# Patient Record
Sex: FEMALE | Race: WHITE | Employment: OTHER | URBAN - NONMETROPOLITAN AREA
[De-identification: names, ages, dates, MRNs, and addresses within clinical notes are randomized per-mention and may not be internally consistent; named-entity substitution may affect disease eponyms.]

---

## 2023-12-30 ENCOUNTER — APPOINTMENT (OUTPATIENT)
Dept: GENERAL RADIOLOGY | Age: 78
DRG: 335 | End: 2023-12-30
Payer: MEDICARE

## 2023-12-30 ENCOUNTER — HOSPITAL ENCOUNTER (INPATIENT)
Age: 78
LOS: 10 days | Discharge: ANOTHER HOSPITAL | DRG: 335 | End: 2024-01-09
Attending: EMERGENCY MEDICINE | Admitting: INTERNAL MEDICINE
Payer: MEDICARE

## 2023-12-30 ENCOUNTER — APPOINTMENT (OUTPATIENT)
Dept: CT IMAGING | Age: 78
DRG: 335 | End: 2023-12-30
Payer: MEDICARE

## 2023-12-30 DIAGNOSIS — K56.699 OTHER SPECIFIED INTESTINAL OBSTRUCTION, UNSPECIFIED WHETHER PARTIAL OR COMPLETE (HCC): ICD-10-CM

## 2023-12-30 DIAGNOSIS — K56.609 SMALL BOWEL OBSTRUCTION (HCC): Primary | ICD-10-CM

## 2023-12-30 DIAGNOSIS — J96.01 ACUTE RESPIRATORY FAILURE WITH HYPOXIA (HCC): ICD-10-CM

## 2023-12-30 LAB
ALBUMIN SERPL-MCNC: 4.6 G/DL (ref 3.5–5.2)
ALBUMIN/GLOB SERPL: 1.4 {RATIO} (ref 1–2.5)
ALP SERPL-CCNC: 77 U/L (ref 35–104)
ALT SERPL-CCNC: 19 U/L (ref 5–33)
ANION GAP SERPL CALCULATED.3IONS-SCNC: 15 MMOL/L (ref 9–17)
AST SERPL-CCNC: 21 U/L
BASOPHILS # BLD: 0.07 K/UL (ref 0–0.2)
BASOPHILS NFR BLD: 1 % (ref 0–2)
BILIRUB SERPL-MCNC: 1.6 MG/DL (ref 0.3–1.2)
BUN SERPL-MCNC: 32 MG/DL (ref 8–23)
BUN/CREAT SERPL: 64 (ref 9–20)
CALCIUM SERPL-MCNC: 9.8 MG/DL (ref 8.6–10.4)
CHLORIDE SERPL-SCNC: 100 MMOL/L (ref 98–107)
CO2 SERPL-SCNC: 24 MMOL/L (ref 20–31)
CREAT SERPL-MCNC: 0.5 MG/DL (ref 0.5–0.9)
EOSINOPHIL # BLD: 0 K/UL (ref 0–0.44)
EOSINOPHILS RELATIVE PERCENT: 0 % (ref 1–4)
ERYTHROCYTE [DISTWIDTH] IN BLOOD BY AUTOMATED COUNT: 13.4 % (ref 11.8–14.4)
GFR SERPL CREATININE-BSD FRML MDRD: >60 ML/MIN/1.73M2
GLUCOSE SERPL-MCNC: 168 MG/DL (ref 70–99)
HCT VFR BLD AUTO: 45.2 % (ref 36.3–47.1)
HGB BLD-MCNC: 15.1 G/DL (ref 11.9–15.1)
IMM GRANULOCYTES # BLD AUTO: 0 K/UL (ref 0–0.3)
IMM GRANULOCYTES NFR BLD: 0 %
LACTATE BLDV-SCNC: 1.7 MMOL/L (ref 0.5–2.2)
LIPASE SERPL-CCNC: 10 U/L (ref 13–60)
LYMPHOCYTES NFR BLD: 1.04 K/UL (ref 1.1–3.7)
LYMPHOCYTES RELATIVE PERCENT: 16 % (ref 24–43)
MCH RBC QN AUTO: 30.2 PG (ref 25.2–33.5)
MCHC RBC AUTO-ENTMCNC: 33.4 G/DL (ref 28.4–34.8)
MCV RBC AUTO: 90.4 FL (ref 82.6–102.9)
MONOCYTES NFR BLD: 1.3 K/UL (ref 0.1–1.2)
MONOCYTES NFR BLD: 20 % (ref 3–12)
MORPHOLOGY: ABNORMAL
NEUTROPHILS NFR BLD: 63 % (ref 36–65)
NEUTS SEG NFR BLD: 4.09 K/UL (ref 1.5–8.1)
NRBC BLD-RTO: 0 PER 100 WBC
PLATELET # BLD AUTO: 369 K/UL (ref 138–453)
PMV BLD AUTO: 8.2 FL (ref 8.1–13.5)
POTASSIUM SERPL-SCNC: 3.6 MMOL/L (ref 3.7–5.3)
PROT SERPL-MCNC: 8 G/DL (ref 6.4–8.3)
RBC # BLD AUTO: 5 M/UL (ref 3.95–5.11)
SODIUM SERPL-SCNC: 139 MMOL/L (ref 135–144)
WBC OTHER # BLD: 6.5 K/UL (ref 3.5–11.3)

## 2023-12-30 PROCEDURE — 83605 ASSAY OF LACTIC ACID: CPT

## 2023-12-30 PROCEDURE — 36415 COLL VENOUS BLD VENIPUNCTURE: CPT

## 2023-12-30 PROCEDURE — 6360000002 HC RX W HCPCS: Performed by: EMERGENCY MEDICINE

## 2023-12-30 PROCEDURE — 74018 RADEX ABDOMEN 1 VIEW: CPT

## 2023-12-30 PROCEDURE — 81001 URINALYSIS AUTO W/SCOPE: CPT

## 2023-12-30 PROCEDURE — 74176 CT ABD & PELVIS W/O CONTRAST: CPT

## 2023-12-30 PROCEDURE — 85025 COMPLETE CBC W/AUTO DIFF WBC: CPT

## 2023-12-30 PROCEDURE — 96375 TX/PRO/DX INJ NEW DRUG ADDON: CPT

## 2023-12-30 PROCEDURE — 94761 N-INVAS EAR/PLS OXIMETRY MLT: CPT

## 2023-12-30 PROCEDURE — A4216 STERILE WATER/SALINE, 10 ML: HCPCS | Performed by: EMERGENCY MEDICINE

## 2023-12-30 PROCEDURE — 1200000000 HC SEMI PRIVATE

## 2023-12-30 PROCEDURE — 96376 TX/PRO/DX INJ SAME DRUG ADON: CPT

## 2023-12-30 PROCEDURE — 94664 DEMO&/EVAL PT USE INHALER: CPT

## 2023-12-30 PROCEDURE — 96361 HYDRATE IV INFUSION ADD-ON: CPT

## 2023-12-30 PROCEDURE — 83690 ASSAY OF LIPASE: CPT

## 2023-12-30 PROCEDURE — 2580000003 HC RX 258: Performed by: INTERNAL MEDICINE

## 2023-12-30 PROCEDURE — 99223 1ST HOSP IP/OBS HIGH 75: CPT | Performed by: SPECIALIST

## 2023-12-30 PROCEDURE — 80053 COMPREHEN METABOLIC PANEL: CPT

## 2023-12-30 PROCEDURE — 96374 THER/PROPH/DIAG INJ IV PUSH: CPT

## 2023-12-30 PROCEDURE — 2580000003 HC RX 258: Performed by: EMERGENCY MEDICINE

## 2023-12-30 PROCEDURE — 99285 EMERGENCY DEPT VISIT HI MDM: CPT

## 2023-12-30 PROCEDURE — C9113 INJ PANTOPRAZOLE SODIUM, VIA: HCPCS | Performed by: EMERGENCY MEDICINE

## 2023-12-30 RX ORDER — FENTANYL CITRATE 50 UG/ML
25 INJECTION, SOLUTION INTRAMUSCULAR; INTRAVENOUS ONCE
Status: COMPLETED | OUTPATIENT
Start: 2023-12-30 | End: 2023-12-30

## 2023-12-30 RX ORDER — FENTANYL CITRATE 50 UG/ML
50 INJECTION, SOLUTION INTRAMUSCULAR; INTRAVENOUS EVERY 4 HOURS PRN
Status: DISCONTINUED | OUTPATIENT
Start: 2023-12-30 | End: 2024-01-09 | Stop reason: HOSPADM

## 2023-12-30 RX ORDER — POTASSIUM CHLORIDE 7.45 MG/ML
10 INJECTION INTRAVENOUS PRN
Status: DISCONTINUED | OUTPATIENT
Start: 2023-12-30 | End: 2024-01-09 | Stop reason: HOSPADM

## 2023-12-30 RX ORDER — POTASSIUM CHLORIDE 20 MEQ/1
40 TABLET, EXTENDED RELEASE ORAL PRN
Status: DISCONTINUED | OUTPATIENT
Start: 2023-12-30 | End: 2024-01-09 | Stop reason: HOSPADM

## 2023-12-30 RX ORDER — SODIUM CHLORIDE 0.9 % (FLUSH) 0.9 %
10 SYRINGE (ML) INJECTION PRN
Status: DISCONTINUED | OUTPATIENT
Start: 2023-12-30 | End: 2024-01-09 | Stop reason: HOSPADM

## 2023-12-30 RX ORDER — POLYETHYLENE GLYCOL 3350 17 G/17G
17 POWDER, FOR SOLUTION ORAL DAILY PRN
Status: DISCONTINUED | OUTPATIENT
Start: 2023-12-30 | End: 2023-12-31

## 2023-12-30 RX ORDER — ALBUTEROL SULFATE 90 UG/1
2 AEROSOL, METERED RESPIRATORY (INHALATION) 4 TIMES DAILY
COMMUNITY
Start: 2023-12-01

## 2023-12-30 RX ORDER — MAGNESIUM SULFATE IN WATER 40 MG/ML
2000 INJECTION, SOLUTION INTRAVENOUS PRN
Status: DISCONTINUED | OUTPATIENT
Start: 2023-12-30 | End: 2024-01-09 | Stop reason: HOSPADM

## 2023-12-30 RX ORDER — ALBUTEROL SULFATE 90 UG/1
2 AEROSOL, METERED RESPIRATORY (INHALATION) 4 TIMES DAILY
Status: DISCONTINUED | OUTPATIENT
Start: 2023-12-30 | End: 2023-12-31

## 2023-12-30 RX ORDER — ONDANSETRON 2 MG/ML
4 INJECTION INTRAMUSCULAR; INTRAVENOUS EVERY 6 HOURS PRN
Status: DISCONTINUED | OUTPATIENT
Start: 2023-12-30 | End: 2023-12-31

## 2023-12-30 RX ORDER — ACETAMINOPHEN 325 MG/1
650 TABLET ORAL EVERY 6 HOURS PRN
Status: DISCONTINUED | OUTPATIENT
Start: 2023-12-30 | End: 2023-12-31

## 2023-12-30 RX ORDER — CALCITONIN SALMON 200 [IU]/.09ML
1 SPRAY, METERED NASAL DAILY
Status: DISCONTINUED | OUTPATIENT
Start: 2023-12-31 | End: 2024-01-09 | Stop reason: HOSPADM

## 2023-12-30 RX ORDER — DEXTROSE MONOHYDRATE, SODIUM CHLORIDE, AND POTASSIUM CHLORIDE 50; 1.49; 4.5 G/1000ML; G/1000ML; G/1000ML
INJECTION, SOLUTION INTRAVENOUS CONTINUOUS
Status: DISCONTINUED | OUTPATIENT
Start: 2023-12-30 | End: 2024-01-06

## 2023-12-30 RX ORDER — ONDANSETRON 4 MG/1
4 TABLET, ORALLY DISINTEGRATING ORAL EVERY 8 HOURS PRN
Status: DISCONTINUED | OUTPATIENT
Start: 2023-12-30 | End: 2023-12-31

## 2023-12-30 RX ORDER — ACETAMINOPHEN 650 MG/1
650 SUPPOSITORY RECTAL EVERY 6 HOURS PRN
Status: DISCONTINUED | OUTPATIENT
Start: 2023-12-30 | End: 2023-12-31

## 2023-12-30 RX ORDER — CALCITONIN SALMON 200 [IU]/.09ML
1 SPRAY, METERED NASAL DAILY
COMMUNITY
Start: 2023-12-04

## 2023-12-30 RX ORDER — 0.9 % SODIUM CHLORIDE 0.9 %
1000 INTRAVENOUS SOLUTION INTRAVENOUS ONCE
Status: COMPLETED | OUTPATIENT
Start: 2023-12-30 | End: 2023-12-30

## 2023-12-30 RX ORDER — SODIUM CHLORIDE 0.9 % (FLUSH) 0.9 %
5-40 SYRINGE (ML) INJECTION EVERY 12 HOURS SCHEDULED
Status: DISCONTINUED | OUTPATIENT
Start: 2023-12-30 | End: 2024-01-09 | Stop reason: HOSPADM

## 2023-12-30 RX ORDER — SODIUM CHLORIDE 9 MG/ML
INJECTION, SOLUTION INTRAVENOUS CONTINUOUS
Status: DISCONTINUED | OUTPATIENT
Start: 2023-12-30 | End: 2023-12-31

## 2023-12-30 RX ORDER — SODIUM CHLORIDE 9 MG/ML
INJECTION, SOLUTION INTRAVENOUS PRN
Status: DISCONTINUED | OUTPATIENT
Start: 2023-12-30 | End: 2024-01-09 | Stop reason: HOSPADM

## 2023-12-30 RX ORDER — ONDANSETRON 2 MG/ML
4 INJECTION INTRAMUSCULAR; INTRAVENOUS ONCE
Status: COMPLETED | OUTPATIENT
Start: 2023-12-30 | End: 2023-12-30

## 2023-12-30 RX ADMIN — ONDANSETRON 4 MG: 2 INJECTION INTRAMUSCULAR; INTRAVENOUS at 20:41

## 2023-12-30 RX ADMIN — Medication 25 MCG: at 22:41

## 2023-12-30 RX ADMIN — SODIUM CHLORIDE 1000 ML: 9 INJECTION, SOLUTION INTRAVENOUS at 21:14

## 2023-12-30 RX ADMIN — SODIUM CHLORIDE: 9 INJECTION, SOLUTION INTRAVENOUS at 23:31

## 2023-12-30 RX ADMIN — FENTANYL CITRATE 25 MCG: 50 INJECTION, SOLUTION INTRAMUSCULAR; INTRAVENOUS at 20:44

## 2023-12-30 RX ADMIN — PANTOPRAZOLE SODIUM 40 MG: 40 INJECTION, POWDER, FOR SOLUTION INTRAVENOUS at 20:46

## 2023-12-30 ASSESSMENT — PAIN SCALES - GENERAL
PAINLEVEL_OUTOF10: 8
PAINLEVEL_OUTOF10: 6
PAINLEVEL_OUTOF10: 8
PAINLEVEL_OUTOF10: 7

## 2023-12-30 ASSESSMENT — PAIN DESCRIPTION - LOCATION
LOCATION: BACK
LOCATION: ABDOMEN
LOCATION: BACK
LOCATION: BACK

## 2023-12-30 ASSESSMENT — PAIN DESCRIPTION - ORIENTATION
ORIENTATION: LOWER
ORIENTATION: LOWER

## 2023-12-30 ASSESSMENT — PAIN - FUNCTIONAL ASSESSMENT: PAIN_FUNCTIONAL_ASSESSMENT: 0-10

## 2023-12-30 ASSESSMENT — PAIN DESCRIPTION - PAIN TYPE: TYPE: CHRONIC PAIN

## 2023-12-31 ENCOUNTER — ANESTHESIA EVENT (OUTPATIENT)
Dept: MEDSURG UNIT | Age: 78
End: 2023-12-31
Payer: MEDICARE

## 2023-12-31 ENCOUNTER — ANESTHESIA (OUTPATIENT)
Dept: MEDSURG UNIT | Age: 78
End: 2023-12-31
Payer: MEDICARE

## 2023-12-31 ENCOUNTER — APPOINTMENT (OUTPATIENT)
Dept: GENERAL RADIOLOGY | Age: 78
DRG: 335 | End: 2023-12-31
Payer: MEDICARE

## 2023-12-31 ENCOUNTER — ANESTHESIA (OUTPATIENT)
Dept: OPERATING ROOM | Age: 78
End: 2023-12-31
Payer: MEDICARE

## 2023-12-31 ENCOUNTER — ANESTHESIA EVENT (OUTPATIENT)
Dept: OPERATING ROOM | Age: 78
End: 2023-12-31
Payer: MEDICARE

## 2023-12-31 LAB
ALBUMIN SERPL-MCNC: 3.9 G/DL (ref 3.5–5.2)
ALBUMIN/GLOB SERPL: 1.4 {RATIO} (ref 1–2.5)
ALP SERPL-CCNC: 61 U/L (ref 35–104)
ALT SERPL-CCNC: 20 U/L (ref 5–33)
ANION GAP SERPL CALCULATED.3IONS-SCNC: 10 MMOL/L (ref 9–17)
AST SERPL-CCNC: 21 U/L
BACTERIA URNS QL MICRO: ABNORMAL
BASOPHILS # BLD: 0 K/UL (ref 0–0.2)
BASOPHILS NFR BLD: 0 % (ref 0–2)
BILIRUB SERPL-MCNC: 1.3 MG/DL (ref 0.3–1.2)
BILIRUB UR QL STRIP: NEGATIVE
BUN SERPL-MCNC: 23 MG/DL (ref 8–23)
BUN/CREAT SERPL: 38 (ref 9–20)
CALCIUM SERPL-MCNC: 8.5 MG/DL (ref 8.6–10.4)
CHLORIDE SERPL-SCNC: 108 MMOL/L (ref 98–107)
CLARITY UR: CLEAR
CO2 SERPL-SCNC: 25 MMOL/L (ref 20–31)
COLOR UR: YELLOW
CREAT SERPL-MCNC: 0.6 MG/DL (ref 0.5–0.9)
EOSINOPHIL # BLD: 0 K/UL (ref 0–0.44)
EOSINOPHILS RELATIVE PERCENT: 0 % (ref 1–4)
EPI CELLS #/AREA URNS HPF: ABNORMAL /HPF (ref 0–25)
ERYTHROCYTE [DISTWIDTH] IN BLOOD BY AUTOMATED COUNT: 13.4 % (ref 11.8–14.4)
GFR SERPL CREATININE-BSD FRML MDRD: >60 ML/MIN/1.73M2
GLUCOSE SERPL-MCNC: 155 MG/DL (ref 70–99)
GLUCOSE UR STRIP-MCNC: NEGATIVE MG/DL
HCT VFR BLD AUTO: 38.2 % (ref 36.3–47.1)
HGB BLD-MCNC: 12.8 G/DL (ref 11.9–15.1)
HGB UR QL STRIP.AUTO: NEGATIVE
IMM GRANULOCYTES # BLD AUTO: 0 K/UL (ref 0–0.3)
IMM GRANULOCYTES NFR BLD: 0 %
KETONES UR STRIP-MCNC: ABNORMAL MG/DL
LEUKOCYTE ESTERASE UR QL STRIP: NEGATIVE
LYMPHOCYTES NFR BLD: 1.76 K/UL (ref 1.1–3.7)
LYMPHOCYTES RELATIVE PERCENT: 27 % (ref 24–43)
MCH RBC QN AUTO: 30.6 PG (ref 25.2–33.5)
MCHC RBC AUTO-ENTMCNC: 33.5 G/DL (ref 28.4–34.8)
MCV RBC AUTO: 91.4 FL (ref 82.6–102.9)
MONOCYTES NFR BLD: 1.3 K/UL (ref 0.1–1.2)
MONOCYTES NFR BLD: 20 % (ref 3–12)
MORPHOLOGY: ABNORMAL
MUCOUS THREADS URNS QL MICRO: ABNORMAL
NEUTROPHILS NFR BLD: 53 % (ref 36–65)
NEUTS SEG NFR BLD: 3.44 K/UL (ref 1.5–8.1)
NITRITE UR QL STRIP: NEGATIVE
NRBC BLD-RTO: 0 PER 100 WBC
PARTIAL THROMBOPLASTIN TIME: 26.3 SEC (ref 26.8–34.8)
PH UR STRIP: 6 [PH] (ref 5–9)
PLATELET # BLD AUTO: 297 K/UL (ref 138–453)
PMV BLD AUTO: 8.3 FL (ref 8.1–13.5)
POTASSIUM SERPL-SCNC: 3.7 MMOL/L (ref 3.7–5.3)
PROT SERPL-MCNC: 6.7 G/DL (ref 6.4–8.3)
PROT UR STRIP-MCNC: ABNORMAL MG/DL
RBC # BLD AUTO: 4.18 M/UL (ref 3.95–5.11)
RBC #/AREA URNS HPF: ABNORMAL /HPF (ref 0–2)
SODIUM SERPL-SCNC: 143 MMOL/L (ref 135–144)
SP GR UR STRIP: 1.02 (ref 1.01–1.02)
UROBILINOGEN UR STRIP-ACNC: NORMAL EU/DL (ref 0–1)
WBC #/AREA URNS HPF: ABNORMAL /HPF (ref 0–5)
WBC OTHER # BLD: 6.5 K/UL (ref 3.5–11.3)

## 2023-12-31 PROCEDURE — 6360000002 HC RX W HCPCS: Performed by: EMERGENCY MEDICINE

## 2023-12-31 PROCEDURE — 87205 SMEAR GRAM STAIN: CPT

## 2023-12-31 PROCEDURE — 94761 N-INVAS EAR/PLS OXIMETRY MLT: CPT

## 2023-12-31 PROCEDURE — 2580000003 HC RX 258: Performed by: NURSE ANESTHETIST, CERTIFIED REGISTERED

## 2023-12-31 PROCEDURE — A4216 STERILE WATER/SALINE, 10 ML: HCPCS | Performed by: EMERGENCY MEDICINE

## 2023-12-31 PROCEDURE — 94664 DEMO&/EVAL PT USE INHALER: CPT

## 2023-12-31 PROCEDURE — 36415 COLL VENOUS BLD VENIPUNCTURE: CPT

## 2023-12-31 PROCEDURE — 2580000003 HC RX 258: Performed by: SPECIALIST

## 2023-12-31 PROCEDURE — 2709999900 HC NON-CHARGEABLE SUPPLY: Performed by: SPECIALIST

## 2023-12-31 PROCEDURE — 99232 SBSQ HOSP IP/OBS MODERATE 35: CPT | Performed by: SPECIALIST

## 2023-12-31 PROCEDURE — 3600000004 HC SURGERY LEVEL 4 BASE: Performed by: SPECIALIST

## 2023-12-31 PROCEDURE — 7100000001 HC PACU RECOVERY - ADDTL 15 MIN: Performed by: SPECIALIST

## 2023-12-31 PROCEDURE — 6360000002 HC RX W HCPCS: Performed by: SPECIALIST

## 2023-12-31 PROCEDURE — 2000000000 HC ICU R&B

## 2023-12-31 PROCEDURE — 6360000002 HC RX W HCPCS: Performed by: INTERNAL MEDICINE

## 2023-12-31 PROCEDURE — 2700000000 HC OXYGEN THERAPY PER DAY

## 2023-12-31 PROCEDURE — 6370000000 HC RX 637 (ALT 250 FOR IP): Performed by: INTERNAL MEDICINE

## 2023-12-31 PROCEDURE — 94640 AIRWAY INHALATION TREATMENT: CPT

## 2023-12-31 PROCEDURE — 93005 ELECTROCARDIOGRAM TRACING: CPT | Performed by: SPECIALIST

## 2023-12-31 PROCEDURE — 7100000000 HC PACU RECOVERY - FIRST 15 MIN: Performed by: SPECIALIST

## 2023-12-31 PROCEDURE — 97166 OT EVAL MOD COMPLEX 45 MIN: CPT

## 2023-12-31 PROCEDURE — 2500000003 HC RX 250 WO HCPCS: Performed by: SPECIALIST

## 2023-12-31 PROCEDURE — 2580000003 HC RX 258: Performed by: EMERGENCY MEDICINE

## 2023-12-31 PROCEDURE — 87075 CULTR BACTERIA EXCEPT BLOOD: CPT

## 2023-12-31 PROCEDURE — 80053 COMPREHEN METABOLIC PANEL: CPT

## 2023-12-31 PROCEDURE — 6360000002 HC RX W HCPCS: Performed by: NURSE ANESTHETIST, CERTIFIED REGISTERED

## 2023-12-31 PROCEDURE — 85025 COMPLETE CBC W/AUTO DIFF WBC: CPT

## 2023-12-31 PROCEDURE — C9113 INJ PANTOPRAZOLE SODIUM, VIA: HCPCS | Performed by: EMERGENCY MEDICINE

## 2023-12-31 PROCEDURE — 2720000010 HC SURG SUPPLY STERILE: Performed by: SPECIALIST

## 2023-12-31 PROCEDURE — 3700000001 HC ADD 15 MINUTES (ANESTHESIA): Performed by: SPECIALIST

## 2023-12-31 PROCEDURE — 85730 THROMBOPLASTIN TIME PARTIAL: CPT

## 2023-12-31 PROCEDURE — 3600000014 HC SURGERY LEVEL 4 ADDTL 15MIN: Performed by: SPECIALIST

## 2023-12-31 PROCEDURE — 51702 INSERT TEMP BLADDER CATH: CPT

## 2023-12-31 PROCEDURE — 0DN80ZZ RELEASE SMALL INTESTINE, OPEN APPROACH: ICD-10-PCS | Performed by: SPECIALIST

## 2023-12-31 PROCEDURE — 74019 RADEX ABDOMEN 2 VIEWS: CPT

## 2023-12-31 PROCEDURE — 3700000000 HC ANESTHESIA ATTENDED CARE: Performed by: SPECIALIST

## 2023-12-31 PROCEDURE — C1765 ADHESION BARRIER: HCPCS | Performed by: SPECIALIST

## 2023-12-31 PROCEDURE — 74018 RADEX ABDOMEN 1 VIEW: CPT

## 2023-12-31 PROCEDURE — 87070 CULTURE OTHR SPECIMN AEROBIC: CPT

## 2023-12-31 PROCEDURE — 2500000003 HC RX 250 WO HCPCS: Performed by: NURSE ANESTHETIST, CERTIFIED REGISTERED

## 2023-12-31 DEVICE — BARRIER, ABSORBABLE, ADHESION
Type: IMPLANTABLE DEVICE | Site: ABDOMEN | Status: FUNCTIONAL
Brand: SEPRAFILM®

## 2023-12-31 RX ORDER — MORPHINE SULFATE 2 MG/ML
0.5 INJECTION, SOLUTION INTRAMUSCULAR; INTRAVENOUS
Status: DISCONTINUED | OUTPATIENT
Start: 2023-12-31 | End: 2024-01-09 | Stop reason: HOSPADM

## 2023-12-31 RX ORDER — PHENYLEPHRINE HYDROCHLORIDE 10 MG/ML
INJECTION INTRAVENOUS PRN
Status: DISCONTINUED | OUTPATIENT
Start: 2023-12-31 | End: 2023-12-31 | Stop reason: SDUPTHER

## 2023-12-31 RX ORDER — PROPOFOL 10 MG/ML
INJECTION, EMULSION INTRAVENOUS PRN
Status: DISCONTINUED | OUTPATIENT
Start: 2023-12-31 | End: 2023-12-31 | Stop reason: SDUPTHER

## 2023-12-31 RX ORDER — PROCHLORPERAZINE EDISYLATE 5 MG/ML
5 INJECTION INTRAMUSCULAR; INTRAVENOUS
Status: ACTIVE | OUTPATIENT
Start: 2023-12-31 | End: 2024-01-01

## 2023-12-31 RX ORDER — DEXAMETHASONE SODIUM PHOSPHATE 4 MG/ML
INJECTION, SOLUTION INTRA-ARTICULAR; INTRALESIONAL; INTRAMUSCULAR; INTRAVENOUS; SOFT TISSUE PRN
Status: DISCONTINUED | OUTPATIENT
Start: 2023-12-31 | End: 2023-12-31 | Stop reason: SDUPTHER

## 2023-12-31 RX ORDER — SODIUM CHLORIDE 0.9 % (FLUSH) 0.9 %
5-40 SYRINGE (ML) INJECTION EVERY 12 HOURS SCHEDULED
Status: DISCONTINUED | OUTPATIENT
Start: 2023-12-31 | End: 2023-12-31

## 2023-12-31 RX ORDER — ONDANSETRON 2 MG/ML
INJECTION INTRAMUSCULAR; INTRAVENOUS PRN
Status: DISCONTINUED | OUTPATIENT
Start: 2023-12-31 | End: 2023-12-31 | Stop reason: SDUPTHER

## 2023-12-31 RX ORDER — ROCURONIUM BROMIDE 10 MG/ML
INJECTION, SOLUTION INTRAVENOUS PRN
Status: DISCONTINUED | OUTPATIENT
Start: 2023-12-31 | End: 2023-12-31 | Stop reason: SDUPTHER

## 2023-12-31 RX ORDER — FENTANYL CITRATE 50 UG/ML
50 INJECTION, SOLUTION INTRAMUSCULAR; INTRAVENOUS EVERY 5 MIN PRN
Status: DISCONTINUED | OUTPATIENT
Start: 2023-12-31 | End: 2023-12-31

## 2023-12-31 RX ORDER — SUCCINYLCHOLINE CHLORIDE 20 MG/ML
INJECTION INTRAMUSCULAR; INTRAVENOUS PRN
Status: DISCONTINUED | OUTPATIENT
Start: 2023-12-31 | End: 2023-12-31 | Stop reason: SDUPTHER

## 2023-12-31 RX ORDER — ONDANSETRON 2 MG/ML
4 INJECTION INTRAMUSCULAR; INTRAVENOUS EVERY 6 HOURS PRN
Status: DISCONTINUED | OUTPATIENT
Start: 2023-12-31 | End: 2024-01-09 | Stop reason: HOSPADM

## 2023-12-31 RX ORDER — SODIUM CHLORIDE, SODIUM LACTATE, POTASSIUM CHLORIDE, CALCIUM CHLORIDE 600; 310; 30; 20 MG/100ML; MG/100ML; MG/100ML; MG/100ML
INJECTION, SOLUTION INTRAVENOUS CONTINUOUS PRN
Status: DISCONTINUED | OUTPATIENT
Start: 2023-12-31 | End: 2023-12-31 | Stop reason: SDUPTHER

## 2023-12-31 RX ORDER — LABETALOL HYDROCHLORIDE 5 MG/ML
10 INJECTION, SOLUTION INTRAVENOUS
Status: DISCONTINUED | OUTPATIENT
Start: 2023-12-31 | End: 2023-12-31

## 2023-12-31 RX ORDER — ENOXAPARIN SODIUM 100 MG/ML
30 INJECTION SUBCUTANEOUS DAILY
Status: DISCONTINUED | OUTPATIENT
Start: 2023-12-31 | End: 2024-01-09 | Stop reason: HOSPADM

## 2023-12-31 RX ORDER — SODIUM CHLORIDE 0.9 % (FLUSH) 0.9 %
5-40 SYRINGE (ML) INJECTION PRN
Status: DISCONTINUED | OUTPATIENT
Start: 2023-12-31 | End: 2024-01-03

## 2023-12-31 RX ORDER — OXYCODONE HYDROCHLORIDE 5 MG/1
10 TABLET ORAL PRN
Status: DISCONTINUED | OUTPATIENT
Start: 2023-12-31 | End: 2023-12-31

## 2023-12-31 RX ORDER — HYDRALAZINE HYDROCHLORIDE 20 MG/ML
10 INJECTION INTRAMUSCULAR; INTRAVENOUS
Status: DISCONTINUED | OUTPATIENT
Start: 2023-12-31 | End: 2023-12-31

## 2023-12-31 RX ORDER — FENTANYL CITRATE 50 UG/ML
INJECTION, SOLUTION INTRAMUSCULAR; INTRAVENOUS PRN
Status: DISCONTINUED | OUTPATIENT
Start: 2023-12-31 | End: 2023-12-31 | Stop reason: SDUPTHER

## 2023-12-31 RX ORDER — OXYCODONE HYDROCHLORIDE 5 MG/1
5 TABLET ORAL PRN
Status: DISCONTINUED | OUTPATIENT
Start: 2023-12-31 | End: 2023-12-31

## 2023-12-31 RX ORDER — CLONIDINE 100 UG/ML
INJECTION, SOLUTION EPIDURAL PRN
Status: DISCONTINUED | OUTPATIENT
Start: 2023-12-31 | End: 2023-12-31 | Stop reason: SDUPTHER

## 2023-12-31 RX ORDER — CEFOXITIN 1 G/1
INJECTION, POWDER, FOR SOLUTION INTRAVENOUS PRN
Status: DISCONTINUED | OUTPATIENT
Start: 2023-12-31 | End: 2023-12-31 | Stop reason: SDUPTHER

## 2023-12-31 RX ORDER — SODIUM CHLORIDE 9 MG/ML
INJECTION, SOLUTION INTRAVENOUS PRN
Status: DISCONTINUED | OUTPATIENT
Start: 2023-12-31 | End: 2023-12-31

## 2023-12-31 RX ORDER — KETAMINE HCL 50MG/ML(1)
SYRINGE (ML) INTRAVENOUS PRN
Status: DISCONTINUED | OUTPATIENT
Start: 2023-12-31 | End: 2023-12-31 | Stop reason: SDUPTHER

## 2023-12-31 RX ORDER — ONDANSETRON 2 MG/ML
4 INJECTION INTRAMUSCULAR; INTRAVENOUS
Status: DISCONTINUED | OUTPATIENT
Start: 2023-12-31 | End: 2023-12-31

## 2023-12-31 RX ORDER — ALBUTEROL SULFATE 90 UG/1
2 AEROSOL, METERED RESPIRATORY (INHALATION) 3 TIMES DAILY PRN
Status: DISCONTINUED | OUTPATIENT
Start: 2023-12-31 | End: 2024-01-02

## 2023-12-31 RX ADMIN — ROCURONIUM BROMIDE 25 MG: 10 INJECTION, SOLUTION INTRAVENOUS at 14:57

## 2023-12-31 RX ADMIN — ONDANSETRON 4 MG: 2 INJECTION INTRAMUSCULAR; INTRAVENOUS at 08:29

## 2023-12-31 RX ADMIN — CEFOXITIN SODIUM 1000 MG: 1 POWDER, FOR SOLUTION INTRAVENOUS at 14:46

## 2023-12-31 RX ADMIN — MORPHINE SULFATE 0.5 MG: 2 INJECTION, SOLUTION INTRAMUSCULAR; INTRAVENOUS at 23:57

## 2023-12-31 RX ADMIN — MORPHINE SULFATE 2 MG: 2 INJECTION, SOLUTION INTRAMUSCULAR; INTRAVENOUS at 15:58

## 2023-12-31 RX ADMIN — POTASSIUM CHLORIDE, DEXTROSE MONOHYDRATE AND SODIUM CHLORIDE: 150; 5; 450 INJECTION, SOLUTION INTRAVENOUS at 11:04

## 2023-12-31 RX ADMIN — CEFAZOLIN 1000 MG: 1 INJECTION, POWDER, FOR SOLUTION INTRAMUSCULAR; INTRAVENOUS at 23:59

## 2023-12-31 RX ADMIN — ALBUTEROL SULFATE 2 PUFF: 90 AEROSOL, METERED RESPIRATORY (INHALATION) at 11:35

## 2023-12-31 RX ADMIN — MORPHINE SULFATE 3 MG: 2 INJECTION, SOLUTION INTRAMUSCULAR; INTRAVENOUS at 16:05

## 2023-12-31 RX ADMIN — CLONIDINE 100 MCG: 100 INJECTION, SOLUTION EPIDURAL at 15:41

## 2023-12-31 RX ADMIN — FENTANYL CITRATE 25 MCG: 50 INJECTION INTRAMUSCULAR; INTRAVENOUS at 14:57

## 2023-12-31 RX ADMIN — DEXAMETHASONE SODIUM PHOSPHATE 4 MG: 4 INJECTION INTRA-ARTICULAR; INTRALESIONAL; INTRAMUSCULAR; INTRAVENOUS; SOFT TISSUE at 15:11

## 2023-12-31 RX ADMIN — SUCCINYLCHOLINE CHLORIDE 40 MG: 20 INJECTION, SOLUTION INTRAMUSCULAR; INTRAVENOUS at 14:51

## 2023-12-31 RX ADMIN — MORPHINE SULFATE 2 MG: 2 INJECTION, SOLUTION INTRAMUSCULAR; INTRAVENOUS at 16:25

## 2023-12-31 RX ADMIN — SUGAMMADEX 50 MG: 100 INJECTION, SOLUTION INTRAVENOUS at 15:42

## 2023-12-31 RX ADMIN — PHENYLEPHRINE HYDROCHLORIDE 200 MCG: 10 INJECTION INTRAVENOUS at 15:05

## 2023-12-31 RX ADMIN — FENTANYL CITRATE 25 MCG: 50 INJECTION INTRAMUSCULAR; INTRAVENOUS at 15:35

## 2023-12-31 RX ADMIN — ONDANSETRON 4 MG: 2 INJECTION INTRAMUSCULAR; INTRAVENOUS at 15:17

## 2023-12-31 RX ADMIN — FENTANYL CITRATE 25 MCG: 50 INJECTION INTRAMUSCULAR; INTRAVENOUS at 14:51

## 2023-12-31 RX ADMIN — Medication 20 MG: at 15:17

## 2023-12-31 RX ADMIN — ONDANSETRON 4 MG: 2 INJECTION INTRAMUSCULAR; INTRAVENOUS at 02:20

## 2023-12-31 RX ADMIN — PROPOFOL 60 MG: 10 INJECTION, EMULSION INTRAVENOUS at 14:51

## 2023-12-31 RX ADMIN — SODIUM CHLORIDE, POTASSIUM CHLORIDE, SODIUM LACTATE AND CALCIUM CHLORIDE: 600; 310; 30; 20 INJECTION, SOLUTION INTRAVENOUS at 14:46

## 2023-12-31 RX ADMIN — SUGAMMADEX 100 MG: 100 INJECTION, SOLUTION INTRAVENOUS at 15:38

## 2023-12-31 RX ADMIN — ROCURONIUM BROMIDE 5 MG: 10 INJECTION, SOLUTION INTRAVENOUS at 14:51

## 2023-12-31 RX ADMIN — PANTOPRAZOLE SODIUM 40 MG: 40 INJECTION, POWDER, FOR SOLUTION INTRAVENOUS at 08:29

## 2023-12-31 RX ADMIN — HYDROCORTISONE SODIUM SUCCINATE 100 MG: 100 INJECTION, POWDER, FOR SOLUTION INTRAMUSCULAR; INTRAVENOUS at 14:57

## 2023-12-31 RX ADMIN — FENTANYL CITRATE 25 MCG: 50 INJECTION INTRAMUSCULAR; INTRAVENOUS at 15:39

## 2023-12-31 RX ADMIN — POTASSIUM CHLORIDE, DEXTROSE MONOHYDRATE AND SODIUM CHLORIDE: 150; 5; 450 INJECTION, SOLUTION INTRAVENOUS at 00:36

## 2023-12-31 ASSESSMENT — PAIN DESCRIPTION - DESCRIPTORS
DESCRIPTORS: ACHING
DESCRIPTORS: ACHING
DESCRIPTORS: ACHING;DISCOMFORT
DESCRIPTORS: ACHING

## 2023-12-31 ASSESSMENT — PAIN DESCRIPTION - ONSET
ONSET: ON-GOING
ONSET: GRADUAL

## 2023-12-31 ASSESSMENT — PAIN SCALES - GENERAL
PAINLEVEL_OUTOF10: 5
PAINLEVEL_OUTOF10: 1
PAINLEVEL_OUTOF10: 6
PAINLEVEL_OUTOF10: 0
PAINLEVEL_OUTOF10: 4
PAINLEVEL_OUTOF10: 0

## 2023-12-31 ASSESSMENT — PAIN DESCRIPTION - ORIENTATION
ORIENTATION: LOWER
ORIENTATION: MID

## 2023-12-31 ASSESSMENT — PAIN DESCRIPTION - LOCATION
LOCATION: BACK
LOCATION: ABDOMEN

## 2023-12-31 ASSESSMENT — PAIN - FUNCTIONAL ASSESSMENT
PAIN_FUNCTIONAL_ASSESSMENT: PREVENTS OR INTERFERES SOME ACTIVE ACTIVITIES AND ADLS
PAIN_FUNCTIONAL_ASSESSMENT: NONE - DENIES PAIN
PAIN_FUNCTIONAL_ASSESSMENT: PREVENTS OR INTERFERES SOME ACTIVE ACTIVITIES AND ADLS
PAIN_FUNCTIONAL_ASSESSMENT: FACE, LEGS, ACTIVITY, CRY, AND CONSOLABILITY (FLACC)

## 2023-12-31 ASSESSMENT — PAIN DESCRIPTION - FREQUENCY
FREQUENCY: INTERMITTENT
FREQUENCY: CONTINUOUS

## 2023-12-31 ASSESSMENT — LIFESTYLE VARIABLES: HOW OFTEN DO YOU HAVE A DRINK CONTAINING ALCOHOL: NEVER

## 2023-12-31 ASSESSMENT — PAIN DESCRIPTION - PAIN TYPE
TYPE: CHRONIC PAIN
TYPE: SURGICAL PAIN
TYPE: CHRONIC PAIN
TYPE: CHRONIC PAIN

## 2023-12-31 NOTE — ANESTHESIA PRE PROCEDURE
Department of Anesthesiology  Preprocedure Note       Name:  Grace Gonzalez   Age:  78 y.o.  :  1945                                          MRN:  [id number]         Date:  2023      Surgeon: Surgeon(s):  Brendan Dumont MD    Procedure: Procedure(s):  LAPAROTOMY EXPLORATORY    Medications prior to admission:   Prior to Admission medications    Medication Sig Start Date End Date Taking? Authorizing Provider   calcitonin (MIACALCIN) 200 UNIT/ACT nasal spray 1 spray by Nasal route daily 23  Yes ProviderXuan MD   VENTOLIN  (90 Base) MCG/ACT inhaler Inhale 2 puffs into the lungs 4 times daily 23  Yes ProviderXuan MD       Current medications:    Current Facility-Administered Medications   Medication Dose Route Frequency Provider Last Rate Last Admin   • albuterol sulfate HFA (PROVENTIL;VENTOLIN;PROAIR) 108 (90 Base) MCG/ACT inhaler 2 puff  2 puff Inhalation TID PRN Armand Longoria MD   2 puff at 23 1135   • pantoprazole (PROTONIX) 40 mg in sodium chloride (PF) 0.9 % 10 mL injection  40 mg IntraVENous Daily Bing Reyes DO   40 mg at 23 0829   • calcitonin (MIACALCIN) nasal spray 1 spray (Patient Supplied)  1 spray Alternating Nares Daily Armand Longoria MD       • 0.9 % sodium chloride infusion   IntraVENous Continuous Armand Longoria MD   Stopped at 23 2340   • sodium chloride flush 0.9 % injection 5-40 mL  5-40 mL IntraVENous 2 times per day Armand Longoria MD       • sodium chloride flush 0.9 % injection 10 mL  10 mL IntraVENous PRN Armand Longoria MD       • 0.9 % sodium chloride infusion   IntraVENous PRN Armand Longoria MD       • potassium chloride (KLOR-CON M) extended release tablet 40 mEq  40 mEq Oral PRN Armand Longoria MD        Or   • potassium bicarb-citric acid (EFFER-K) effervescent tablet 40 mEq  40 mEq Oral PRN Armand Longoria MD        Or   • potassium chloride 10 mEq/100 mL IVPB (Peripheral

## 2023-12-31 NOTE — CONSULTS
Chief complaint is crampy abdominal pain with nausea and vomiting lack of a bowel movement for several days    Patient is a 80-year-old female with a past history significant for severe COPD malnutrition with significant weight loss prior history of left breast cancer and an abdominal hysterectomy who presents to the emergency room claiming that she has not had a bowel movement for approximately 4 days with increasing abdominal distention crampy abdominal pain associated nausea and vomiting she denies fever or chills no frequency urgency or dysuria    In the emergency room patient has CT scan of the abdomen pelvis shows massive gastric and small bowel dilation with a purported transition point in the right lower quadrant patient is also noted to have stool in the right ascending colon she also has a distended urinary bladder    Past medical history  1.  COPD  2.  Malnutrition with significant muscular wasting  3.  History of left breast cancer  4.  Prior abdominal hysterectomy  5.  Severe osteoarthrosis  6.  Aneurysmal abdominal aorta  7.  Peripheral vascular disease  8.  Osteoporosis  9.  Cholelithiasis    Past surgical history  1.  Left mastectomy with reconstruction using a breast implant  2.  Abdominal hysterectomy    Medications  1.  Calcitonin  2.  Ventolin inhaler    Allergies  1.  Adhesive tape  2.  Aspirin  3.  Codeine  4.  IVP dye  5.  Lidocaine  6.  Motrin    Social history patient continues to smoke occasionally drinks    Family history noncontributory    Review of systems patient denies any vertigo diplopia no circumoral numbness dysarthria dysphagia no hot or cold intolerance no history of jaundice keysha colored stools dark-colored urine appetite is generally good weight is stable no hematemesis hematochezia melena no blood dyscrasias    Physical examination  Patient is an elderly female who is exceedingly frail has considerable muscular wasting within the face neck and chest  Vital signs are stable she

## 2023-12-31 NOTE — PROGRESS NOTES
Patient arrived to  at this time from surgery, patient resting comfortably with eyes closed, FLACC 1 at this time, dressing clean dry and intact, bay patent and draining clear yellow urine, patient is alert and orient x4, family at bedside, all needs met, positioned for comfort, call light within reach, care on-going.

## 2023-12-31 NOTE — PROGRESS NOTES
Patient walked in hallway with writer at this time +1 assist with a cane. Patient tolerated activity fairly well. Patient was then assisted to sit up in the chair. Call light in reach, patient denies any other needs. Care ongoing.

## 2023-12-31 NOTE — PROGRESS NOTES
Patient was given incentive spirometer and acapella at this time and instructed on how and how often to use them, as well as the benefits of their use. Patient verbalizes understanding and was able to provide return demonstration about how to use them. Ice chips were also provided which was approved by Dr. Longoria. Care ongoing.

## 2023-12-31 NOTE — PROGRESS NOTES
Pt brought up to room 315 from ER via stretcher at this time, daughter with pt. Pt assisted over to bed, tolerated well. Report received from MINERVA Coats. Vitals and admission assessment completed at this time, see flowsheet for more details.Pt denies any pain at this time, states abdomen is tender. Navigator and medication reconciliation completed at this time. NG tube attached to suction and set to low intermittent. All needs met at this time, call light within reach. Care ongoing.

## 2023-12-31 NOTE — H&P
Armand Longoria M.D.  Internal Medicine History and Phyisical    Patient: Grace Gonzalez  Date of Admission: 2023  7:55 PM  Date of Evaluation: 2023      Patient:  Grace Gonzalez  MRN: [id number]    Chief Complaint:    Chief Complaint   Patient presents with    Abdominal Pain     Lower abd pain with emesis and constipation, states last BM . Not tolerating PO intake, denies history of bowel obstructions       History Obtained From:  patient, electronic medical record    PCP: No primary care provider on file.    History of Present Illness:   The patient is a 78 y.o. female who presents with lower abdominal pain with vomiting and constipation for about 1 day.  She ate a large dinner during [place] and has had issues since.  She not feeling very well and she has had vomiting.  About 4 days she has had the abdominal pain.  History of a prior  x 1 as well as hysterectomy.  She denies any fevers or chills.  No recent respiratory illness.  She denies any shortness of breath or chest pain.    Past Medical History:        Diagnosis Date    Arthritis     Asthma     Blood circulation, collateral     Cancer (HCC)     COPD (chronic obstructive pulmonary disease) (HCC)     Pneumonia        Past Surgical History:        Procedure Laterality Date    BREAST SURGERY       SECTION      EYE SURGERY      SKIN BIOPSY      RHODA AND BSO (CERVIX REMOVED)         Family History:   No family history on file.    Social History:   TOBACCO:   reports that she has been smoking cigarettes. She started smoking about 60 years ago. She has a 60.0 pack-year smoking history. She has never used smokeless tobacco.  ETOH:   reports no history of alcohol use.      Allergies:  Adhesive tape, Aspirin, Codeine, Iodinated contrast media, Lidocaine, and Motrin [ibuprofen]    Medications Prior to Admission:    Prior to Admission medications    Medication Sig Start Date End Date Taking? Authorizing Provider   calcitonin (MIACALCIN)  endplate compression deformity.      Cholelithiasis.      RECOMMENDATIONS:   If there is focal tenderness, recommend MRI of the lumbar spine for further   evaluation.             Assessment:    Small bowel obstruction (HCC)    Principal Problem:    Small bowel obstruction (HCC) likely adhesion related from prior  and hysterectomy  Resolved Problems:    * No resolved hospital problems. *      Patient Active Problem List    Diagnosis Date Noted    Small bowel obstruction (HCC) likely adhesion related from prior  and hysterectomy 2023       Plan:     This patient requires inpatient admission because of Small bowel obstruction (HCC)   Factors affecting the medical complexity of this patient include Principal Problem:    Small bowel obstruction (HCC) likely adhesion related from prior  and hysterectomy  Resolved Problems:    * No resolved hospital problems. *    Estimated length of stay is 3 days  Condition is --MODERATE COMPLEXITY--  Small bowel obstruction is the working diagnosis.  Surgery involved.  Very conservative management most likely.  Medication Monitoring / High Risk Medications: none         [facility] Medication Reconciliation documentation:  [x] I have utilized all available immediate resources to obtain, update, or review the patient's current medications (including all prescriptions, over-the-counter products, herbals, cannabinoid products and bitamin/mineral/dietary/nutritional supplements.  [If 'yes\", STOP HERE]     []  The patient is not eligible for medication reconciliation; the patient is in an emergent medical situation where delaying treatment would jeopardize the patient's health    []  I did NOT confirm, update or review the patient's current list of medications today.  [DOES NOT SATISFY [facility] PERFORMANCE]        [facility] Advanced Care Planning documentation: (check appropriate boxes [x]  )  [x] I have confirmed that the patient's Advance Care Plan is present, Code Status

## 2023-12-31 NOTE — PROGRESS NOTES
Discharge Criteria    Inpatients must meet Criteria 1 through 7. All other patients are either YES or N/A. If a NO is chosen then Anesthesia or Surgeon must be notified.      1.  Minimum 30 minutes after last dose of sedative medication.    Yes      2.  Systolic BP between 90 - 160. Diastolic BP between 60 - 90.    Yes      3.  Pulse between 60 - 120    Yes      4.  Respirations between 8 - 25.    Yes      5.  SpO2 92% - 100%.    Yes      6.  Able to cough and swallow or return to baseline function.    Yes      7.  Alert and oriented or return to baseline mental status.    Yes      8.  Demonstrates controlled, coordinated movements, ambulates with steady gait, or return to baseline activity function.    Yes      9.  Minimal or no pain or nausea, or at a level tolerable and acceptable to patient.    Yes      10. Takes and retains oral fluids as allowed.    Yes      11. Procedural / perioperative site stable.  Minimal or no bleeding.    Yes          12. If GI endoscopy procedure, minimal or no abdominal distention or passing flatus.    N/A      13. Written discharge instructions and emergency telephone number provided.    Yes      14. Accompanied by a responsible adult.    Yes

## 2023-12-31 NOTE — ED PROVIDER NOTES
[facility] MED SURG  Emergency Department Encounter  Emergency Medicine Resident     Pt Name:Grace Gonzalez  MRN: [id number]  Birthdate 1945  Date of evaluation: 12/30/23  PCP:  No primary care provider on file.  8:12 PM EST      CHIEF COMPLAINT       Chief Complaint   Patient presents with    Abdominal Pain     Lower abd pain with emesis and constipation, states last BM 12/25. Not tolerating PO intake, denies history of bowel obstructions       HISTORY OF PRESENT ILLNESS  (Location/Symptom, Timing/Onset, Context/Setting, Quality, Duration, Modifying Factors, Severity.)      Grace Gonzalez is a 78 y.o. female who presents with abdominal pain for the past 4 days, reports vomiting over the same duration, and unable to have a bowel movement.  History of C-sections, as well as hysterectomy.  No fevers does have a history of coughing, but no worsening shortness of breath or difficulty breathing.    PAST MEDICAL / SURGICAL / SOCIAL / FAMILY HISTORY      has a past medical history of Arthritis, Asthma, Blood circulation, collateral, Cancer (HCC), COPD (chronic obstructive pulmonary disease) (HCC), and Pneumonia.       has a past surgical history that includes Abdomen surgery; Hysterectomy; Breast surgery; eye surgery; and skin biopsy.      Social History     Socioeconomic History    Marital status: Single     Spouse name: Not on file    Number of children: Not on file    Years of education: Not on file    Highest education level: Not on file   Occupational History    Not on file   Tobacco Use    Smoking status: Every Day     Current packs/day: 1.00     Average packs/day: 1 pack/day for 60.0 years (60.0 ttl pk-yrs)     Types: Cigarettes     Start date: 1964    Smokeless tobacco: Never   Substance and Sexual Activity    Alcohol use: Never    Drug use: Never    Sexual activity: Not on file   Other Topics Concern    Not on file   Social History Narrative    Not on file     Social Determinants of Health     Financial  no

## 2023-12-31 NOTE — PLAN OF CARE
Problem: Discharge Planning  Goal: Discharge to home or other facility with appropriate resources  Outcome: Progressing  Flowsheets (Taken 12/31/2023 0002)  Discharge to home or other facility with appropriate resources: Identify barriers to discharge with patient and caregiver     Problem: Pain  Goal: Verbalizes/displays adequate comfort level or baseline comfort level  Outcome: Progressing  Flowsheets (Taken 12/30/2023 2315)  Verbalizes/displays adequate comfort level or baseline comfort level: Encourage patient to monitor pain and request assistance     Problem: Safety - Adult  Goal: Free from fall injury  Outcome: Progressing

## 2023-12-31 NOTE — PROGRESS NOTES
Occupational Therapy  Facility/Department: [facility] MED SURG  Occupational Therapy Initial Assessment    Name: Grace Gonzalez  : 1945  MRN: [id number]  Date of Service: 2023    Discharge Recommendations:  Continue to assess pending progress, Home with assist PRN  OT Equipment Recommendations  Equipment Needed: Yes  Mobility Devices: Walker  Walker: Rolling  Other: for safety d/t generalized weakness     Patient Diagnosis(es): The encounter diagnosis was Small bowel obstruction (HCC).  Past Medical History:  has a past medical history of Arthritis, Asthma, Blood circulation, collateral, Cancer (HCC), COPD (chronic obstructive pulmonary disease) (HCC), and Pneumonia.  Past Surgical History:  has a past surgical history that includes  section; Total abdominal hysterectomy w/ bilateral salpingoophorectomy; Breast surgery; eye surgery; and skin biopsy.    Treatment Diagnosis: Generalized weakness.    Assessment   Performance deficits / Impairments: Decreased functional mobility ;Decreased safe awareness;Decreased balance;Decreased ADL status;Decreased endurance;Decreased high-level IADLs;Decreased strength  Assessment: Pt is 79 yo female presenting to [facility] for small bowel obstruction. Pt demo decrease strength/ endurance, dynamic balance, and safety awareness with tubing during ADLs and functional mobility/transfers. Pt would benefit frm skilled OT to increase srength, endurance, dynamic balance, and safety awareness during ADLs and functional mobility/transfer tasks.  Treatment Diagnosis: Generalized weakness.  Prognosis: Good  Decision Making: Medium Complexity  Assistance / Modification: CGA ADLs/functional mobility  REQUIRES OT FOLLOW-UP: Yes        Plan   Occupational Therapy Plan  Times Per Day: Once a day  Days Per Week: 7 Days  Current Treatment Recommendations: Strengthening, Balance training, Functional mobility training, Endurance training, Safety education & training, Patient/Caregiver

## 2023-12-31 NOTE — PROGRESS NOTES
Hospital day #1  Patient continues to have intermittent crampy abdominal pain she denies any passage of flatus or stool she has a moderate amount of bilious drainage from nasogastric tube  Vital signs are stable she is afebrile  Chest clear to auscultation percussion  Cardiovascular regular rate and rhythm  Abdomen is still distended without guarding or rebound bowel sounds are hypoactive she has no CVA tenderness no groin or incisional hernias  Neurologically without focal findings    Laboratories all within normal limits  2 views of the abdomen shows distended small bowel with absence of gas or stool in the colon suggestive of a high-grade and/or complete obstruction    Assessment and plan this is a patient with a small bowel obstruction we will bring her to the operating room for an abdominal exploration lysis of adhesions risk benefits and potential complications were discussed with the patient and her family members and they wish to proceed with the surgery

## 2023-12-31 NOTE — RT PROTOCOL NOTE
RESPIRATORY ASSESSMENT PROTOCOL                                                                                              Patient Name: Grace Gonzalez Room#: 0315/0315-01 : 1945     Admitting diagnosis: Small bowel obstruction (HCC) [K56.609]       Medical History:   Past Medical History:   Diagnosis Date    Arthritis     Asthma     Blood circulation, collateral     Cancer (HCC)     COPD (chronic obstructive pulmonary disease) (HCC)     Pneumonia        PATIENT ASSESSMENT    LABORATORY DATA  Hematology:   Lab Results   Component Value Date/Time    WBC 6.5 2023 08:45 PM    RBC 5.00 2023 08:45 PM    HGB 15.1 2023 08:45 PM    HCT 45.2 2023 08:45 PM     2023 08:45 PM     Chemistry:  No results found for: \"PHART\", \"GJP4RDW\", \"PO2ART\", \"B6LQITMC\", \"DJB4MXT\", \"PBEA\", \"NBEA\"    VITALS  Pulse: 78   Respirations: 16  BP: (!) 125/59  SpO2: 90 % O2 Device: None (Room air)  Temp: 97.7 °F (36.5 °C)    SKIN COLOR  [x] Normal  [] Pale  [] Dusky  [] Cyanotic    RESPIRATORY PATTERN  [x] Normal  [] Dyspnea  [] Cheyne-Esparza  [] Kussmaul  [] Biots    AMBULATORY  [x] Yes  [] No  [] With Assistance    PEAK FLOW  Predicted:     Personal Best:            Patient Acuity 0 1 2 3 4 Score   Level of Consciousness (LOC) [x]  Alert & Oriented or Pt normal LOC []  Confused;follows directions []  Confused & uncooper-ative []  Obtunded []  Comatose 0   Respiratory Rate  (RR) [x]  Reg. rate & pattern. 12 - 20 bpm  []  Increased RR. Greater than 20 bpm   []  SOB w/ exertion or RR greater than 24 bpm []  Access- ory muscle use at rest. Abn.  resp. []  SOB at rest.   0   Bilateral Breath Sounds (BBS) [x]  Clear []  Diminish-ed bases  []  Diminish-ed t/o, or rales   []  Sporadic, scattered wheezes or rhonchi []  Persistentwheezes and, or absent BBS 0   Cough [x]  Strong, effective, & non-prod. []  Effective & prod. Less than 25 ml (2 TBSP) over past 24 hrs []  Ineffective & non-prod to less than 25 ML

## 2023-12-31 NOTE — PROGRESS NOTES
Shift assessment and vitals obtained at this time as charted. Vitals WNL. Patient is complaining of 4 out of 10 pain in her back, patient was repositioned and heating pad remains in place. Patient is alert and oriented x4. End expiratory wheezes noted to bilateral upper lung lobes, patient encouraged to cough and deep breath. Bowel sounds active to right upper and lower quadrant, hypoactive to left upper and lower quadrants. Abdomen is tender. NG remains in place to right nare and hooked up to low intermittent suction. Assessment otherwise as charted, see flowsheets. Patient is resting in the bed with call light in reach and family at bedside, denies other needs at this time. Care ongoing.

## 2023-12-31 NOTE — PROGRESS NOTES
Reassessment and vitals obtained at this time as charted. Vitals WNL. Patient does complain of 5 out of 10 pain in her lower back, heating pad in place. Patient remains alert and oriented x4. Lung sounds clear to diminished throughout. Occasional congested, productive cough noted. Bowel sounds remain active to right upper and lower quadrants, hypoactive to left upper and lower quadrants. NG tube remains in place to right nare and is draining. Assessment otherwise as charted, see flowsheets. Patient is resting in the chair with call light in reach, denies other needs at this time. Care ongoing.

## 2024-01-01 LAB
ALBUMIN SERPL-MCNC: 3.3 G/DL (ref 3.5–5.2)
ALBUMIN/GLOB SERPL: 1.2 {RATIO} (ref 1–2.5)
ALP SERPL-CCNC: 53 U/L (ref 35–104)
ALT SERPL-CCNC: 15 U/L (ref 5–33)
ANION GAP SERPL CALCULATED.3IONS-SCNC: 10 MMOL/L (ref 9–17)
AST SERPL-CCNC: 14 U/L
ATYPICAL LYMPHOCYTE ABSOLUTE COUNT: 0.31 K/UL
ATYPICAL LYMPHOCYTES: 4 %
BASOPHILS # BLD: 0 K/UL (ref 0–0.2)
BASOPHILS NFR BLD: 0 % (ref 0–2)
BILIRUB SERPL-MCNC: 0.8 MG/DL (ref 0.3–1.2)
BUN SERPL-MCNC: 15 MG/DL (ref 8–23)
BUN/CREAT SERPL: 25 (ref 9–20)
CALCIUM SERPL-MCNC: 8.1 MG/DL (ref 8.6–10.4)
CHLORIDE SERPL-SCNC: 108 MMOL/L (ref 98–107)
CO2 SERPL-SCNC: 24 MMOL/L (ref 20–31)
CREAT SERPL-MCNC: 0.6 MG/DL (ref 0.5–0.9)
EKG ATRIAL RATE: 86 BPM
EKG P AXIS: 85 DEGREES
EKG P-R INTERVAL: 112 MS
EKG Q-T INTERVAL: 354 MS
EKG QRS DURATION: 78 MS
EKG QTC CALCULATION (BAZETT): 423 MS
EKG R AXIS: 80 DEGREES
EKG T AXIS: 14 DEGREES
EKG VENTRICULAR RATE: 86 BPM
EOSINOPHIL # BLD: 0 K/UL (ref 0–0.44)
EOSINOPHILS RELATIVE PERCENT: 0 % (ref 1–4)
ERYTHROCYTE [DISTWIDTH] IN BLOOD BY AUTOMATED COUNT: 13.4 % (ref 11.8–14.4)
GFR SERPL CREATININE-BSD FRML MDRD: >60 ML/MIN/1.73M2
GLUCOSE SERPL-MCNC: 144 MG/DL (ref 70–99)
HCT VFR BLD AUTO: 38.3 % (ref 36.3–47.1)
HGB BLD-MCNC: 12.6 G/DL (ref 11.9–15.1)
IMM GRANULOCYTES # BLD AUTO: 0.15 K/UL (ref 0–0.3)
IMM GRANULOCYTES NFR BLD: 2 %
INR PPP: 1.1
LYMPHOCYTES NFR BLD: 2 K/UL (ref 1.1–3.7)
LYMPHOCYTES RELATIVE PERCENT: 26 % (ref 24–43)
MCH RBC QN AUTO: 30.8 PG (ref 25.2–33.5)
MCHC RBC AUTO-ENTMCNC: 32.9 G/DL (ref 28.4–34.8)
MCV RBC AUTO: 93.6 FL (ref 82.6–102.9)
MONOCYTES NFR BLD: 0.46 K/UL (ref 0.1–1.2)
MONOCYTES NFR BLD: 6 % (ref 3–12)
MORPHOLOGY: NORMAL
NEUTROPHILS NFR BLD: 62 % (ref 36–65)
NEUTS SEG NFR BLD: 4.78 K/UL (ref 1.5–8.1)
NRBC BLD-RTO: 0 PER 100 WBC
PLATELET # BLD AUTO: 245 K/UL (ref 138–453)
PMV BLD AUTO: 8.5 FL (ref 8.1–13.5)
POTASSIUM SERPL-SCNC: 3.7 MMOL/L (ref 3.7–5.3)
PROT SERPL-MCNC: 6 G/DL (ref 6.4–8.3)
PROTHROMBIN TIME: 14.4 SEC (ref 11.9–14.8)
RBC # BLD AUTO: 4.09 M/UL (ref 3.95–5.11)
SODIUM SERPL-SCNC: 142 MMOL/L (ref 135–144)
WBC OTHER # BLD: 7.7 K/UL (ref 3.5–11.3)

## 2024-01-01 PROCEDURE — 6360000002 HC RX W HCPCS: Performed by: SPECIALIST

## 2024-01-01 PROCEDURE — 94761 N-INVAS EAR/PLS OXIMETRY MLT: CPT

## 2024-01-01 PROCEDURE — 97110 THERAPEUTIC EXERCISES: CPT

## 2024-01-01 PROCEDURE — 80053 COMPREHEN METABOLIC PANEL: CPT

## 2024-01-01 PROCEDURE — 93010 ELECTROCARDIOGRAM REPORT: CPT | Performed by: INTERNAL MEDICINE

## 2024-01-01 PROCEDURE — 2580000003 HC RX 258: Performed by: SPECIALIST

## 2024-01-01 PROCEDURE — 1200000000 HC SEMI PRIVATE

## 2024-01-01 PROCEDURE — 51702 INSERT TEMP BLADDER CATH: CPT

## 2024-01-01 PROCEDURE — 85610 PROTHROMBIN TIME: CPT

## 2024-01-01 PROCEDURE — 36415 COLL VENOUS BLD VENIPUNCTURE: CPT

## 2024-01-01 PROCEDURE — 2700000000 HC OXYGEN THERAPY PER DAY

## 2024-01-01 PROCEDURE — 2500000003 HC RX 250 WO HCPCS: Performed by: SPECIALIST

## 2024-01-01 PROCEDURE — 85025 COMPLETE CBC W/AUTO DIFF WBC: CPT

## 2024-01-01 PROCEDURE — A4216 STERILE WATER/SALINE, 10 ML: HCPCS | Performed by: SPECIALIST

## 2024-01-01 PROCEDURE — 97162 PT EVAL MOD COMPLEX 30 MIN: CPT

## 2024-01-01 PROCEDURE — C9113 INJ PANTOPRAZOLE SODIUM, VIA: HCPCS | Performed by: SPECIALIST

## 2024-01-01 PROCEDURE — 94640 AIRWAY INHALATION TREATMENT: CPT

## 2024-01-01 PROCEDURE — 6360000002 HC RX W HCPCS: Performed by: INTERNAL MEDICINE

## 2024-01-01 RX ORDER — FUROSEMIDE 10 MG/ML
40 INJECTION INTRAMUSCULAR; INTRAVENOUS ONCE
Status: COMPLETED | OUTPATIENT
Start: 2024-01-01 | End: 2024-01-01

## 2024-01-01 RX ADMIN — CEFAZOLIN 1000 MG: 1 INJECTION, POWDER, FOR SOLUTION INTRAMUSCULAR; INTRAVENOUS at 08:16

## 2024-01-01 RX ADMIN — ONDANSETRON 4 MG: 2 INJECTION INTRAMUSCULAR; INTRAVENOUS at 00:22

## 2024-01-01 RX ADMIN — ENOXAPARIN SODIUM 30 MG: 100 INJECTION SUBCUTANEOUS at 08:13

## 2024-01-01 RX ADMIN — POTASSIUM CHLORIDE, DEXTROSE MONOHYDRATE AND SODIUM CHLORIDE: 150; 5; 450 INJECTION, SOLUTION INTRAVENOUS at 21:47

## 2024-01-01 RX ADMIN — MORPHINE SULFATE 0.5 MG: 2 INJECTION, SOLUTION INTRAMUSCULAR; INTRAVENOUS at 12:36

## 2024-01-01 RX ADMIN — ONDANSETRON 4 MG: 2 INJECTION INTRAMUSCULAR; INTRAVENOUS at 07:08

## 2024-01-01 RX ADMIN — FUROSEMIDE 40 MG: 10 INJECTION, SOLUTION INTRAMUSCULAR; INTRAVENOUS at 22:28

## 2024-01-01 RX ADMIN — MORPHINE SULFATE 0.5 MG: 2 INJECTION, SOLUTION INTRAMUSCULAR; INTRAVENOUS at 05:27

## 2024-01-01 RX ADMIN — ONDANSETRON 4 MG: 2 INJECTION INTRAMUSCULAR; INTRAVENOUS at 16:27

## 2024-01-01 RX ADMIN — PANTOPRAZOLE SODIUM 40 MG: 40 INJECTION, POWDER, FOR SOLUTION INTRAVENOUS at 08:13

## 2024-01-01 RX ADMIN — ALBUTEROL SULFATE 2 PUFF: 90 AEROSOL, METERED RESPIRATORY (INHALATION) at 22:31

## 2024-01-01 RX ADMIN — ALBUTEROL SULFATE 2 PUFF: 90 AEROSOL, METERED RESPIRATORY (INHALATION) at 01:11

## 2024-01-01 RX ADMIN — MORPHINE SULFATE 0.5 MG: 2 INJECTION, SOLUTION INTRAMUSCULAR; INTRAVENOUS at 22:26

## 2024-01-01 RX ADMIN — POTASSIUM CHLORIDE, DEXTROSE MONOHYDRATE AND SODIUM CHLORIDE: 150; 5; 450 INJECTION, SOLUTION INTRAVENOUS at 00:25

## 2024-01-01 ASSESSMENT — PAIN DESCRIPTION - DESCRIPTORS
DESCRIPTORS: ACHING;DISCOMFORT
DESCRIPTORS: ACHING;STABBING
DESCRIPTORS: SORE
DESCRIPTORS: SORE
DESCRIPTORS: ACHING

## 2024-01-01 ASSESSMENT — PAIN SCALES - GENERAL
PAINLEVEL_OUTOF10: 6
PAINLEVEL_OUTOF10: 7
PAINLEVEL_OUTOF10: 6
PAINLEVEL_OUTOF10: 0
PAINLEVEL_OUTOF10: 4
PAINLEVEL_OUTOF10: 6

## 2024-01-01 ASSESSMENT — PAIN - FUNCTIONAL ASSESSMENT
PAIN_FUNCTIONAL_ASSESSMENT: ACTIVITIES ARE NOT PREVENTED
PAIN_FUNCTIONAL_ASSESSMENT: PREVENTS OR INTERFERES SOME ACTIVE ACTIVITIES AND ADLS
PAIN_FUNCTIONAL_ASSESSMENT: ACTIVITIES ARE NOT PREVENTED

## 2024-01-01 ASSESSMENT — PAIN DESCRIPTION - FREQUENCY
FREQUENCY: INTERMITTENT
FREQUENCY: INTERMITTENT

## 2024-01-01 ASSESSMENT — PAIN DESCRIPTION - LOCATION
LOCATION: ABDOMEN
LOCATION: RIB CAGE

## 2024-01-01 ASSESSMENT — PAIN DESCRIPTION - PAIN TYPE
TYPE: SURGICAL PAIN
TYPE: SURGICAL PAIN

## 2024-01-01 ASSESSMENT — PAIN DESCRIPTION - ORIENTATION
ORIENTATION: MID

## 2024-01-01 ASSESSMENT — PAIN DESCRIPTION - ONSET: ONSET: GRADUAL

## 2024-01-01 NOTE — PROGRESS NOTES
Writer at bedside for shift assessment and vitals. Patient is alert and oriented x4- calm and cooperative with assessment. Patient currently denies pain when at rest. NG tube remains in place- low intermittent suctions. Bowel sounds hypoactive x4- no reports of nausea or GI upset. Dressing remains in place- clean, dry and intact. Calderon remains in place draining, clear, yellow urine. No additional requests at this time- call light in  place, bed in lowest position and alarm engaged to promote patient safety. Family at bedside, plan of care on going.

## 2024-01-01 NOTE — PROGRESS NOTES
Vitals and reassessment complete- see flow sheet for details. Patient resting in bed when entering room, no signs of distress noted. Calderon catheter remains patent, draining and intact. Bowel sounds remain hypoactive x4- abdominal binder in place. No additional requests at this time, call light placed within reach, bed in lowest position. Family remains at bedside. Plan of care on going.

## 2024-01-01 NOTE — PROGRESS NOTES
Progress Note    SUBJECTIVE: Pain is controlled.  No fever or chills.    OBJECTIVE:    Vitals:   TEMPERATURE:  Current - Temp: 97.8 °F (36.6 °C); Max - Temp  Av.7 °F (36.5 °C)  Min: 97 °F (36.1 °C)  Max: 98.5 °F (36.9 °C)  RESPIRATIONS RANGE: Resp  Av.8  Min: 11  Max: 33  PULSE RANGE: Pulse  Av.5  Min: 63  Max: 80  BLOOD PRESSURE RANGE:  Systolic (24hrs), Av , Min:102 , Max:151   ; Diastolic (24hrs), Av, Min:48, Max:74    PULSE OXIMETRY RANGE: SpO2  Av.9 %  Min: 91 %  Max: 98 %  24HR INTAKE/OUTPUT:    Intake/Output Summary (Last 24 hours) at 2024 1209  Last data filed at 2024 1012  Gross per 24 hour   Intake 907.1 ml   Output 1625 ml   Net -717.9 ml     -----------------------------------------------------------------  Exam:  General: A & O x3 and alert  HEENT: Supple neck & negative  Heart: Regular  Lungs: clear to auscultation bilaterally & no retractions  Abdomen: NG tube.  Extremities:  No edema   Neuro: NonFocal     -----------------------------------------------------------------  Diagnostic Data:  Lab Results   Component Value Date    WBC 7.7 2024    HGB 12.6 2024     2024       Lab Results   Component Value Date    BUN 15 2024    CREATININE 0.6 2024     2024    K 3.7 2024    CALCIUM 8.1 (L) 2024     (H) 2024    CO2 24 2024    LABGLOM >60 2024       Lab Results   Component Value Date    WBCUA 2 TO 5 2023    RBCUA 0 TO 2 2023    EPITHUA 0 TO 2 2023    LEUKOCYTESUR NEGATIVE 2023    SPECGRAV 1.020 2023    GLUCOSEU NEGATIVE 2023    KETUA TRACE (A) 2023    PROTEINU TRACE (A) 2023    HGBUR NEGATIVE 2023    BACTERIA 2+ (A) 2023       No results found for: \"MYOGLOBIN\", \"TROPONINT\", \"CKTOTAL\", \"CKMB\", \"PROBNP\"    XR ABDOMEN (KUB) (SINGLE AP VIEW)    Result Date: 2023  EXAMINATION: ONE SUPINE XRAY VIEW(S) OF THE ABDOMEN 2023

## 2024-01-01 NOTE — PLAN OF CARE
Problem: Discharge Planning  Goal: Discharge to home or other facility with appropriate resources  Outcome: Progressing  Flowsheets (Taken 1/1/2024 0434)  Discharge to home or other facility with appropriate resources:   Identify barriers to discharge with patient and caregiver   Identify discharge learning needs (meds, wound care, etc)   Refer to discharge planning if patient needs post-hospital services based on physician order or complex needs related to functional status, cognitive ability or social support system   Arrange for interpreters to assist at discharge as needed   Arrange for needed discharge resources and transportation as appropriate     Problem: Pain  Goal: Verbalizes/displays adequate comfort level or baseline comfort level  Outcome: Progressing  Flowsheets  Taken 1/1/2024 0434 by Orlin Arellano RN  Verbalizes/displays adequate comfort level or baseline comfort level:   Encourage patient to monitor pain and request assistance   Administer analgesics based on type and severity of pain and evaluate response   Consider cultural and social influences on pain and pain management   Notify Licensed Independent Practitioner if interventions unsuccessful or patient reports new pain   Implement non-pharmacological measures as appropriate and evaluate response   Assess pain using appropriate pain scale  Taken 12/31/2023 1700 by Lizz Burrows, RN  Verbalizes/displays adequate comfort level or baseline comfort level: Encourage patient to monitor pain and request assistance     Problem: Safety - Adult  Goal: Free from fall injury  Outcome: Progressing  Flowsheets (Taken 1/1/2024 0434)  Free From Fall Injury:   Based on caregiver fall risk screen, instruct family/caregiver to ask for assistance with transferring infant if caregiver noted to have fall risk factors   Instruct family/caregiver on patient safety  Note: Call light in reach at all times, frequent checks, bed in lowest position, wheels of bed  and chair locked, non skid footwear on, appropriate transfer techniques, personal items within reach, walkways free of obstructions, fall risk armband and sign displayed, [place] fall risk score per protocol. No falls this shift, care ongoing.

## 2024-01-01 NOTE — PROGRESS NOTES
Physical Therapy  Facility/Department: [facility] MED SURG  Physical Therapy Initial Assessment    Name: Grace Gonzalez  : 1945  MRN: [id number]  Date of Service: 2024    Discharge Recommendations:  Continue to assess pending progress          Patient Diagnosis(es): The primary encounter diagnosis was Small bowel obstruction (HCC). A diagnosis of Other specified intestinal obstruction, unspecified whether partial or complete (HCC) was also pertinent to this visit.  Past Medical History:  has a past medical history of Arthritis, Asthma, Blood circulation, collateral, Cancer (HCC), COPD (chronic obstructive pulmonary disease) (HCC), and Pneumonia.  Past Surgical History:  has a past surgical history that includes  section; Total abdominal hysterectomy w/ bilateral salpingoophorectomy; Breast surgery; eye surgery; and skin biopsy.    Assessment   Body Structures, Functions, Activity Limitations Requiring Skilled Therapeutic Intervention: Decreased functional mobility ;Decreased endurance;Increased pain;Decreased balance;Decreased high-level IADLs;Decreased strength;Decreased tolerance to work activity  Assessment: Pt is a 78 year old female that was referred to PT after a bowel obstruction with surgery. Pt presents with pain limiting functional and mobility. Pt requires assistance for transfers and and AD for ambulation. Pt presents with BLE weakness. Pt will benefit from skilled PT in order to address these deficits.  Treatment Diagnosis: general weakness  Specific Instructions for Next Treatment: 1x/daily on weekends and holidays  Therapy Prognosis: Good  Decision Making: Medium Complexity  Requires PT Follow-Up: Yes  Activity Tolerance  Activity Tolerance: Patient tolerated evaluation without incident;Patient limited by pain     Plan   Physical Therapy Plan  General Plan: 2 times a day 7 days a week  Specific Instructions for Next Treatment: 1x/daily on weekends and holidays  Current Treatment  Recommendations: Strengthening, Balance training, Gait training, Home exercise program, Therapeutic activities, Safety education & training, Neuromuscular re-education, Functional mobility training, Stair training, Transfer training, Patient/Caregiver education & training, Endurance training, Pain management  Safety Devices  Type of Devices: All fall risk precautions in place, Call light within reach, Nurse notified, Left in chair  Restraints  Restraints Initially in Place: No     Restrictions  Restrictions/Precautions  Restrictions/Precautions: NPO, General Precautions, Fall Risk, Up as Tolerated  Required Braces or Orthoses?: No  Lower Extremity Weight Bearing Restrictions  Right Lower Extremity Weight Bearing: Weight Bearing As Tolerated     Subjective   General  Chart Reviewed: Yes  Patient assessed for rehabilitation services?: Yes  Family / Caregiver Present: No  Referring Practitioner: Dr. Longoria  Referral Date : 12/31/23  Diagnosis: bowel obstruction  Subjective  Subjective: Pt reports pain in her abdomin but agreeable to skilled PT eval         Social/Functional History  Social/Functional History  Lives With: Alone  Type of Home: House  Home Layout: Two level  Home Access: Level entry  Bathroom Shower/Tub: Tub/Shower unit  Bathroom Toilet: Standard  Home Equipment: Cane  Has the patient had two or more falls in the past year or any fall with injury in the past year?: Yes  ADL Assistance: Independent  Homemaking Assistance: Independent  Homemaking Responsibilities: Yes  Ambulation Assistance: Independent  Transfer Assistance: Independent  Active : Yes  Mode of Transportation: [facility]  Occupation: Retired  Type of Occupation: retired mechanical engineering  Vision/Hearing       Cognition   Orientation  Overall Orientation Status: Within Functional Limits  Cognition  Overall Cognitive Status: WFL     Objective   Pulse: 73  Heart Rate Source: Monitor  BP: (!) 128/54  BP Location: Right upper arm  BP Method:

## 2024-01-01 NOTE — PROGRESS NOTES
Occupational Therapy  Facility/Department: [facility] ICU  Daily Treatment Note  NAME: Grace Gonzalez  : 1945  MRN: [id number]    Date of Service: 2024    Discharge Recommendations:  Continue to assess pending progress, Home with assist PRN  OT Equipment Recommendations  Walker: Rolling  Other: for safety d/t generalized weakness      Patient Diagnosis(es): The primary encounter diagnosis was Small bowel obstruction (HCC). A diagnosis of Other specified intestinal obstruction, unspecified whether partial or complete (HCC) was also pertinent to this visit.     Assessment    Discharge Recommendations: Continue to assess pending progress;Home with assist PRN  Other: for safety d/t generalized weakness      Plan   Occupational Therapy Plan  Times Per Day: Once a day  Days Per Week: 7 Days  Current Treatment Recommendations: Strengthening;Balance training;Functional mobility training;Endurance training;Safety education & training;Patient/Caregiver education & training;Positioning;Self-Care / ADL;Home management training     Restrictions       Subjective   Subjective  Subjective: Pt in bed upon arrival.  Agreeable to light ther ex due to abd pain.  Pain: Pt reports 8/10 abd pain at rest.  Orientation  Overall Orientation Status: Within Functional Limits           Objective    Vitals              OT Exercises  Exercise Treatment: Pt completed B UE ther ex with 1# dumbell in bed with HOB elevated to 90*, 15 reps x 1 set x 4 planes to tolerance, to increase B UE strength and activity tolerance for safe return to PLOF.     Safety Devices  Type of Devices: All fall risk precautions in place;Call light within reach;Left in bed;Nurse notified     Patient Education  Education Given To: Patient;Family  Education Provided: Home Exercise Program  Education Provided Comments: Provided with discharge folder and extensively reviewed.  Education Method: Verbal;Printed Information/Hand-outs  Barriers to Learning: None  Education

## 2024-01-01 NOTE — PLAN OF CARE
RN  Verbalizes/displays adequate comfort level or baseline comfort level: Encourage patient to monitor pain and request assistance     Problem: Safety - Adult  Goal: Free from fall injury  1/1/2024 0800 by Joie Hernandez, RN  Outcome: Progressing  1/1/2024 0434 by Orlin Arellano, RN  Outcome: Progressing  Flowsheets (Taken 1/1/2024 0434)  Free From Fall Injury:   Based on caregiver fall risk screen, instruct family/caregiver to ask for assistance with transferring infant if caregiver noted to have fall risk factors   Instruct family/caregiver on patient safety  Note: Call light in reach at all times, frequent checks, bed in lowest position, wheels of bed and chair locked, non skid footwear on, appropriate transfer techniques, personal items within reach, walkways free of obstructions, fall risk armband and sign displayed, [place] fall risk score per protocol. No falls this shift, care ongoing.       Problem: Skin/Tissue Integrity  Goal: Absence of new skin breakdown  Description: 1.  Monitor for areas of redness and/or skin breakdown  2.  Assess vascular access sites hourly  3.  Every 4-6 hours minimum:  Change oxygen saturation probe site  4.  Every 4-6 hours:  If on nasal continuous positive airway pressure, respiratory therapy assess nares and determine need for appliance change or resting period.  Outcome: Progressing     Problem: Respiratory - Adult  Goal: Achieves optimal ventilation and oxygenation  Outcome: Progressing  Flowsheets (Taken 1/1/2024 0700)  Achieves optimal ventilation and oxygenation:   Assess for changes in respiratory status   Assess for changes in mentation and behavior   Position to facilitate oxygenation and minimize respiratory effort   Oxygen supplementation based on oxygen saturation or arterial blood gases   Assess and instruct to report shortness of breath or any respiratory difficulty   Respiratory therapy support as indicated     Problem: Cardiovascular - Adult  Goal: Maintains  Progressing  Flowsheets (Taken 1/1/2024 0700)  Maintains or returns to baseline bowel function:   Assess bowel function   Administer IV fluids as ordered to ensure adequate hydration   Administer ordered medications as needed   Encourage mobilization and activity  Goal: Maintains adequate nutritional intake  Outcome: Progressing  Goal: Establish and maintain optimal ostomy function  Outcome: Progressing     Problem: Infection - Adult  Goal: Absence of infection at discharge  Outcome: Progressing  Flowsheets (Taken 1/1/2024 0700)  Absence of infection at discharge:   Assess and monitor for signs and symptoms of infection   Monitor lab/diagnostic results   Monitor all insertion sites i.e., indwelling lines, tubes and drains   Administer medications as ordered

## 2024-01-01 NOTE — PROGRESS NOTES
Patients daughter called out for nausea medication. Writer entered the room to patient resting with eyes closed. Daughter wanted the nausea medications to be given so when the patient woke up she wouldn't be nauseous. Patient opened eyes and requested ice chips. It was explained to the patient and daughter that prn medications should be used when they are needed and due to Zofran being every 6 hours hours, if nausea wasn't occurring now it should be given when patient is experiencing nausea. Daughter and patient stated they understood. Zofran will be given at a later time.

## 2024-01-01 NOTE — PROGRESS NOTES
Vitals and reassessment complete- see flow sheet for details. Patient remains alert and oriented x4- cooperative with assessment, see virgilio sheet for details. No additional requests at this time, call light in reach, bed in lowest position and alarm engaged to promote patient safety. Family remains at bedside- plan of care on going.

## 2024-01-01 NOTE — PROGRESS NOTES
Postoperative day #1  Patient complains of incisional discomfort and a sore throat  Vital signs are stable she is afebrile  Urine output is adequate  Chest clear to auscultation percussion  Cardiovascular regular rate and rhythm without murmur gallop  Abdomen tender due to incisional discomfort abdominal binder and dressing are dry and intact  Extremities negative Homans  Neurologically without focal findings    Laboratories within normal limits    Assessment and plan patient is status post abdominal exploration and lysis of adhesions for small bowel obstruction due to an adhesive band patient is doing well at this point in time I believe she can be transferred to regular room on the floor again encouraging mobilization and pulmonary toilet will discontinue the Calderon cardiac monitor continue to monitor the patient's progress

## 2024-01-02 ENCOUNTER — APPOINTMENT (OUTPATIENT)
Dept: GENERAL RADIOLOGY | Age: 79
DRG: 335 | End: 2024-01-02
Payer: MEDICARE

## 2024-01-02 PROBLEM — R09.02 HYPOXIA: Status: ACTIVE | Noted: 2024-01-02

## 2024-01-02 PROBLEM — E43 SEVERE MALNUTRITION (HCC): Status: ACTIVE | Noted: 2024-01-02

## 2024-01-02 LAB
ALBUMIN SERPL-MCNC: 3.3 G/DL (ref 3.5–5.2)
ALBUMIN/GLOB SERPL: 1.1 {RATIO} (ref 1–2.5)
ALP SERPL-CCNC: 59 U/L (ref 35–104)
ALT SERPL-CCNC: 12 U/L (ref 5–33)
ANION GAP SERPL CALCULATED.3IONS-SCNC: 11 MMOL/L (ref 9–17)
AST SERPL-CCNC: 16 U/L
BASOPHILS # BLD: 0 K/UL (ref 0–0.2)
BASOPHILS NFR BLD: 0 % (ref 0–2)
BILIRUB SERPL-MCNC: 1.2 MG/DL (ref 0.3–1.2)
BUN SERPL-MCNC: 12 MG/DL (ref 8–23)
BUN/CREAT SERPL: 24 (ref 9–20)
CALCIUM SERPL-MCNC: 8.4 MG/DL (ref 8.6–10.4)
CHLORIDE SERPL-SCNC: 99 MMOL/L (ref 98–107)
CO2 SERPL-SCNC: 26 MMOL/L (ref 20–31)
CREAT SERPL-MCNC: 0.5 MG/DL (ref 0.5–0.9)
EOSINOPHIL # BLD: 0 K/UL (ref 0–0.44)
EOSINOPHILS RELATIVE PERCENT: 0 % (ref 1–4)
ERYTHROCYTE [DISTWIDTH] IN BLOOD BY AUTOMATED COUNT: 13.3 % (ref 11.8–14.4)
GFR SERPL CREATININE-BSD FRML MDRD: >60 ML/MIN/1.73M2
GLUCOSE SERPL-MCNC: 167 MG/DL (ref 70–99)
HCT VFR BLD AUTO: 38.2 % (ref 36.3–47.1)
HGB BLD-MCNC: 12.7 G/DL (ref 11.9–15.1)
IMM GRANULOCYTES # BLD AUTO: 0 K/UL (ref 0–0.3)
IMM GRANULOCYTES NFR BLD: 0 %
LYMPHOCYTES NFR BLD: 0.78 K/UL (ref 1.1–3.7)
LYMPHOCYTES RELATIVE PERCENT: 8 % (ref 24–43)
MAGNESIUM SERPL-MCNC: 2 MG/DL (ref 1.6–2.6)
MCH RBC QN AUTO: 30.3 PG (ref 25.2–33.5)
MCHC RBC AUTO-ENTMCNC: 33.2 G/DL (ref 28.4–34.8)
MCV RBC AUTO: 91.2 FL (ref 82.6–102.9)
MONOCYTES NFR BLD: 1.08 K/UL (ref 0.1–1.2)
MONOCYTES NFR BLD: 11 % (ref 3–12)
MORPHOLOGY: NORMAL
NEUTROPHILS NFR BLD: 81 % (ref 36–65)
NEUTS SEG NFR BLD: 7.94 K/UL (ref 1.5–8.1)
NRBC BLD-RTO: 0 PER 100 WBC
PLATELET # BLD AUTO: 262 K/UL (ref 138–453)
PMV BLD AUTO: 9.2 FL (ref 8.1–13.5)
POTASSIUM SERPL-SCNC: 3 MMOL/L (ref 3.7–5.3)
POTASSIUM SERPL-SCNC: 3.6 MMOL/L (ref 3.7–5.3)
PROT SERPL-MCNC: 6.2 G/DL (ref 6.4–8.3)
RBC # BLD AUTO: 4.19 M/UL (ref 3.95–5.11)
SODIUM SERPL-SCNC: 136 MMOL/L (ref 135–144)
WBC OTHER # BLD: 9.8 K/UL (ref 3.5–11.3)

## 2024-01-02 PROCEDURE — 85025 COMPLETE CBC W/AUTO DIFF WBC: CPT

## 2024-01-02 PROCEDURE — 97168 OT RE-EVAL EST PLAN CARE: CPT

## 2024-01-02 PROCEDURE — 84132 ASSAY OF SERUM POTASSIUM: CPT

## 2024-01-02 PROCEDURE — 2580000003 HC RX 258: Performed by: SPECIALIST

## 2024-01-02 PROCEDURE — 2500000003 HC RX 250 WO HCPCS: Performed by: SPECIALIST

## 2024-01-02 PROCEDURE — 6360000002 HC RX W HCPCS: Performed by: INTERNAL MEDICINE

## 2024-01-02 PROCEDURE — 05HB33Z INSERTION OF INFUSION DEVICE INTO RIGHT BASILIC VEIN, PERCUTANEOUS APPROACH: ICD-10-PCS | Performed by: SURGERY

## 2024-01-02 PROCEDURE — 6360000002 HC RX W HCPCS: Performed by: SPECIALIST

## 2024-01-02 PROCEDURE — A4216 STERILE WATER/SALINE, 10 ML: HCPCS | Performed by: SPECIALIST

## 2024-01-02 PROCEDURE — C9113 INJ PANTOPRAZOLE SODIUM, VIA: HCPCS | Performed by: SPECIALIST

## 2024-01-02 PROCEDURE — 83735 ASSAY OF MAGNESIUM: CPT

## 2024-01-02 PROCEDURE — 6370000000 HC RX 637 (ALT 250 FOR IP): Performed by: SPECIALIST

## 2024-01-02 PROCEDURE — 2700000000 HC OXYGEN THERAPY PER DAY

## 2024-01-02 PROCEDURE — 94664 DEMO&/EVAL PT USE INHALER: CPT

## 2024-01-02 PROCEDURE — 97110 THERAPEUTIC EXERCISES: CPT

## 2024-01-02 PROCEDURE — 2580000003 HC RX 258: Performed by: NURSE ANESTHETIST, CERTIFIED REGISTERED

## 2024-01-02 PROCEDURE — 71046 X-RAY EXAM CHEST 2 VIEWS: CPT

## 2024-01-02 PROCEDURE — 94640 AIRWAY INHALATION TREATMENT: CPT

## 2024-01-02 PROCEDURE — 94761 N-INVAS EAR/PLS OXIMETRY MLT: CPT

## 2024-01-02 PROCEDURE — 97116 GAIT TRAINING THERAPY: CPT

## 2024-01-02 PROCEDURE — 1200000000 HC SEMI PRIVATE

## 2024-01-02 PROCEDURE — 80053 COMPREHEN METABOLIC PANEL: CPT

## 2024-01-02 PROCEDURE — 36415 COLL VENOUS BLD VENIPUNCTURE: CPT

## 2024-01-02 PROCEDURE — 94669 MECHANICAL CHEST WALL OSCILL: CPT

## 2024-01-02 RX ORDER — ALBUTEROL SULFATE 2.5 MG/3ML
2.5 SOLUTION RESPIRATORY (INHALATION)
Status: DISCONTINUED | OUTPATIENT
Start: 2024-01-02 | End: 2024-01-09 | Stop reason: HOSPADM

## 2024-01-02 RX ORDER — ALBUTEROL SULFATE 90 UG/1
2 AEROSOL, METERED RESPIRATORY (INHALATION) 4 TIMES DAILY
Status: DISCONTINUED | OUTPATIENT
Start: 2024-01-02 | End: 2024-01-02

## 2024-01-02 RX ORDER — 0.9 % SODIUM CHLORIDE 0.9 %
500 INTRAVENOUS SOLUTION INTRAVENOUS ONCE
Status: DISCONTINUED | OUTPATIENT
Start: 2024-01-02 | End: 2024-01-09 | Stop reason: HOSPADM

## 2024-01-02 RX ORDER — ALBUTEROL SULFATE 2.5 MG/3ML
2.5 SOLUTION RESPIRATORY (INHALATION) EVERY 4 HOURS
Status: DISCONTINUED | OUTPATIENT
Start: 2024-01-02 | End: 2024-01-09 | Stop reason: HOSPADM

## 2024-01-02 RX ADMIN — SODIUM CHLORIDE, PRESERVATIVE FREE 10 ML: 5 INJECTION INTRAVENOUS at 09:23

## 2024-01-02 RX ADMIN — POTASSIUM CHLORIDE 10 MEQ: 7.46 INJECTION, SOLUTION INTRAVENOUS at 09:14

## 2024-01-02 RX ADMIN — ALBUTEROL SULFATE 2 PUFF: 90 AEROSOL, METERED RESPIRATORY (INHALATION) at 15:38

## 2024-01-02 RX ADMIN — PANTOPRAZOLE SODIUM 40 MG: 40 INJECTION, POWDER, FOR SOLUTION INTRAVENOUS at 09:22

## 2024-01-02 RX ADMIN — ALBUTEROL SULFATE 2.5 MG: 2.5 SOLUTION RESPIRATORY (INHALATION) at 20:01

## 2024-01-02 RX ADMIN — MORPHINE SULFATE 0.5 MG: 2 INJECTION, SOLUTION INTRAMUSCULAR; INTRAVENOUS at 11:56

## 2024-01-02 RX ADMIN — POTASSIUM CHLORIDE 10 MEQ: 7.46 INJECTION, SOLUTION INTRAVENOUS at 15:18

## 2024-01-02 RX ADMIN — ENOXAPARIN SODIUM 30 MG: 100 INJECTION SUBCUTANEOUS at 09:23

## 2024-01-02 RX ADMIN — POTASSIUM CHLORIDE 10 MEQ: 7.46 INJECTION, SOLUTION INTRAVENOUS at 11:38

## 2024-01-02 RX ADMIN — POTASSIUM CHLORIDE 10 MEQ: 7.46 INJECTION, SOLUTION INTRAVENOUS at 12:44

## 2024-01-02 RX ADMIN — SODIUM CHLORIDE, PRESERVATIVE FREE 10 ML: 5 INJECTION INTRAVENOUS at 11:56

## 2024-01-02 RX ADMIN — POTASSIUM CHLORIDE, DEXTROSE MONOHYDRATE AND SODIUM CHLORIDE: 150; 5; 450 INJECTION, SOLUTION INTRAVENOUS at 09:09

## 2024-01-02 RX ADMIN — POTASSIUM CHLORIDE 10 MEQ: 7.46 INJECTION, SOLUTION INTRAVENOUS at 10:49

## 2024-01-02 RX ADMIN — POTASSIUM CHLORIDE, DEXTROSE MONOHYDRATE AND SODIUM CHLORIDE: 150; 5; 450 INJECTION, SOLUTION INTRAVENOUS at 21:34

## 2024-01-02 RX ADMIN — MORPHINE SULFATE 0.5 MG: 2 INJECTION, SOLUTION INTRAMUSCULAR; INTRAVENOUS at 18:34

## 2024-01-02 RX ADMIN — POTASSIUM CHLORIDE 10 MEQ: 7.46 INJECTION, SOLUTION INTRAVENOUS at 13:54

## 2024-01-02 RX ADMIN — MORPHINE SULFATE 0.5 MG: 2 INJECTION, SOLUTION INTRAMUSCULAR; INTRAVENOUS at 14:30

## 2024-01-02 ASSESSMENT — PAIN DESCRIPTION - LOCATION
LOCATION: ABDOMEN

## 2024-01-02 ASSESSMENT — PAIN DESCRIPTION - DESCRIPTORS
DESCRIPTORS: ACHING

## 2024-01-02 ASSESSMENT — PAIN SCALES - GENERAL
PAINLEVEL_OUTOF10: 6
PAINLEVEL_OUTOF10: 5
PAINLEVEL_OUTOF10: 2
PAINLEVEL_OUTOF10: 6
PAINLEVEL_OUTOF10: 2

## 2024-01-02 ASSESSMENT — PAIN DESCRIPTION - PAIN TYPE
TYPE: ACUTE PAIN;SURGICAL PAIN

## 2024-01-02 ASSESSMENT — PAIN DESCRIPTION - FREQUENCY
FREQUENCY: CONTINUOUS
FREQUENCY: CONTINUOUS
FREQUENCY: INTERMITTENT
FREQUENCY: CONTINUOUS

## 2024-01-02 ASSESSMENT — PAIN DESCRIPTION - ORIENTATION
ORIENTATION: LOWER;MID
ORIENTATION: MID;LOWER
ORIENTATION: LOWER;MID
ORIENTATION: LOWER;MID

## 2024-01-02 ASSESSMENT — PAIN DESCRIPTION - ONSET
ONSET: ON-GOING

## 2024-01-02 ASSESSMENT — PAIN - FUNCTIONAL ASSESSMENT
PAIN_FUNCTIONAL_ASSESSMENT: ACTIVITIES ARE NOT PREVENTED

## 2024-01-02 NOTE — PROGRESS NOTES
Notified respiratory of increased oxygen. Writer also attempted to get patient to cough due to increased phlegm in lungs and patient is breathing very shallow due to pain and is not coughing or deep breathing as directed.

## 2024-01-02 NOTE — PROGRESS NOTES
Postoperative day #2  Patient required a dose of Lasix last night for purported crackles in her lungs with a concomitant increase in her urine output now today her urine appears very concentrated is sluggish meanwhile her daughter presents issues from a psychosocial issue it would appear that the stress of the situation is beginning to wear upon her she has quite labile emotionally and distraught even though I believe that the patient is doing quite well after her surgical procedure    Vital signs are stable she is afebrile  HEENT unchanged  Chest clear to auscultation percussion  Cardiovascular regular rate and rhythm without murmur gallop  Abdomen is soft dressing is dry and intact bowel sounds are absent  Musculoskeletal negative Homans  Neurologically without focal findings    Laboratory sodium 136 potassium 3.0 chloride 99 CO2 26 BUN 12 creatinine of 0.5 magnesium of 2 calcium of 8.4 glucose of 107 67 albumin is 3.3 white count of 9.8 H&H 12 and 38 platelet count of 262    Assessment plan postoperative day #2 after an abdominal exploration cyst and lysis of adhesions for an adhesive small bowel obstruction I believe that the patient is doing remarkably well given her multiple comorbidities IV access appears to be an issue and we will be required for a number of days he suggested that we obtain a PICC line for continued IV fluid administration given the psychosocial issues that the daughter presents I have spoken to the nursing supervisor about providing  and or a  for the daughter's welfare will going to replace the patient's electrolytes and continue to monitor her urine output

## 2024-01-02 NOTE — CARE COORDINATION
Case Management Assessment  Initial Evaluation    Date/Time of Evaluation: 1/2/2024 5:16 PM  Assessment Completed by: MARIELA Juarez    If patient is discharged prior to next notation, then this note serves as note for discharge by case management.    Patient Name: Grace Gonzalez                   YOB: 1945  Diagnosis: Small bowel obstruction (HCC) [K56.609]                   Date / Time: 12/30/2023  7:55 PM    Patient Admission Status: Inpatient   Readmission Risk (Low < 19, Mod (19-27), High > 27): Readmission Risk Score: 10.8    Current PCP: Diomedes Hall MD  PCP verified by CM? Yes    Chart Reviewed: Yes      History Provided by: Patient, Child/Family  Patient Orientation: Alert and Oriented, Person, Place, Situation, Self    Patient Cognition: Alert    Hospitalization in the last 30 days (Readmission):  No    If yes, Readmission Assessment in  Navigator will be completed.    Advance Directives:      Code Status: Full Code   Patient's Primary Decision Maker is: Legal Next of Kin    Primary Decision Maker: LoreneJohana - Child - [phone]    Discharge Planning:    Patient lives with: Alone Type of Home: House  Primary Care Giver: Self  Patient Support Systems include: Children, Family Members   Current Financial resources: Medicare  Current community resources: None  Current services prior to admission: Durable Medical Equipment            Current DME: Cane            Type of Home Care services:  Nursing Services, PT    ADLS  Prior functional level: Independent in ADLs/IADLs  Current functional level: Assistance with the following:    PT AM-PAC:   /24  OT AM-PAC: 16 /24    Family can provide assistance at DC: Yes  Would you like Case Management to discuss the discharge plan with any other family members/significant others, and if so, who? Yes  Plans to Return to Present Housing: Yes  Other Identified Issues/Barriers to RETURNING to current housing: none  Potential

## 2024-01-02 NOTE — PROGRESS NOTES
Patient states she is feeling better since lasix, ventolin inhaler, and increased oxygen prior in the shift. Patients lung sounds have improved. Will continue to monitor this shift.

## 2024-01-02 NOTE — PROGRESS NOTES
[facility]  Inpatient/Observation/Outpatient Rehabilitation    Date: 2024  Patient Name: Grace Gonzalez       [x] Inpatient Acute/Observation       []  Outpatient  : 1945       Plan of Care/Recert ends    [] Pt no showed for scheduled appointment    [x] Pt refused/declined therapy at this time due to:  Not feeling well and family member requesting to come back in the afternoon.         [] Pt cancelled due to:  [] No Reason Given   [] Sick/ill   [] Other:    Therapist/Assistant will attempt to see this patient, at our earliest opportunity.       Omayra López, PTA Date: 2024

## 2024-01-02 NOTE — PLAN OF CARE
Problem: Discharge Planning  Goal: Discharge to home or other facility with appropriate resources  Outcome: Progressing     Problem: Pain  Goal: Verbalizes/displays adequate comfort level or baseline comfort level  Outcome: Progressing     Problem: Safety - Adult  Goal: Free from fall injury  Outcome: Progressing     Problem: Skin/Tissue Integrity  Goal: Absence of new skin breakdown  Description: 1.  Monitor for areas of redness and/or skin breakdown  2.  Assess vascular access sites hourly  3.  Every 4-6 hours minimum:  Change oxygen saturation probe site  4.  Every 4-6 hours:  If on nasal continuous positive airway pressure, respiratory therapy assess nares and determine need for appliance change or resting period.  Outcome: Progressing     Problem: Respiratory - Adult  Goal: Achieves optimal ventilation and oxygenation  Outcome: Progressing     Problem: Cardiovascular - Adult  Goal: Maintains optimal cardiac output and hemodynamic stability  Outcome: Progressing     Problem: Skin/Tissue Integrity - Adult  Goal: Skin integrity remains intact  Outcome: Progressing  Goal: Incisions, wounds, or drain sites healing without S/S of infection  Outcome: Progressing  Goal: Oral mucous membranes remain intact  Outcome: Progressing     Problem: Musculoskeletal - Adult  Goal: Return mobility to safest level of function  Outcome: Progressing     Problem: Gastrointestinal - Adult  Goal: Minimal or absence of nausea and vomiting  Outcome: Progressing  Goal: Maintains or returns to baseline bowel function  Outcome: Progressing  Goal: Maintains adequate nutritional intake  Outcome: Progressing  Goal: Establish and maintain optimal ostomy function  Outcome: Progressing     Problem: Infection - Adult  Goal: Absence of infection at discharge  Outcome: Progressing     Problem: Nutrition Deficit:  Goal: Optimize nutritional status  1/2/2024 1323 by Tayla Roque, RN  Outcome: Progressing  Flowsheets (Taken 1/2/2024 0852 by Freida

## 2024-01-02 NOTE — PROGRESS NOTES
Physical Therapy  Facility/Department: [facility] MED SURG  Daily Treatment Note  NAME: Grace Gonzalez  : 1945  MRN: [id number]    Date of Service: 2024    Discharge Recommendations:  Continue to assess pending progress     Patient Diagnosis(es): The primary encounter diagnosis was Small bowel obstruction (HCC). A diagnosis of Other specified intestinal obstruction, unspecified whether partial or complete (HCC) was also pertinent to this visit.    Assessment   Assessment: Bed mobility and Transfers: CGA/SBAx1. Pt. took 5-10 steps from bed to chair. No LOB noted. Narrowed MEDINA and decreased BLE step length. Additonal time needed for transfers. Reclined seated exercises x15-20 in all available planes of motion.  Activity Tolerance: Patient tolerated evaluation without incident;Patient limited by pain     Plan    Physical Therapy Plan  General Plan: 2 times a day 7 days a week  Specific Instructions for Next Treatment: 1x/daily on weekends and holidays  Current Treatment Recommendations: Strengthening;Balance training;Gait training;Home exercise program;Therapeutic activities;Safety education & training;Neuromuscular re-education;Functional mobility training;Stair training;Transfer training;Patient/Caregiver education & training;Endurance training;Pain management     Restrictions  Restrictions/Precautions  Restrictions/Precautions: NPO, General Precautions, Fall Risk, Up as Tolerated  Required Braces or Orthoses?: No  Lower Extremity Weight Bearing Restrictions  Right Lower Extremity Weight Bearing: Weight Bearing As Tolerated     Subjective    Subjective  Subjective: Pt. in bed upon arrival, agreeable to therapy at this time  Pain: Denies  Orientation  Overall Orientation Status: Within Functional Limits     Objective   Bed Mobility Training  Bed Mobility Training: Yes  Overall Level of Assistance: Minimum assistance;Assist X1  Interventions: Safety awareness training;Verbal cues  Rolling: Minimum

## 2024-01-02 NOTE — PROGRESS NOTES
Progress Note    SUBJECTIVE:    Patient seen for f/u of Small bowel obstruction (HCC).  She resting in bed no distress. Currently on 3L per NC.  Complaints of back pain.       ROS:   Constitutional: negative  for fevers, and negative for chills.  Respiratory: negative for shortness of breath, negative for cough, and negative for wheezing  Cardiovascular: negative for chest pain, and negative for palpitations  Gastrointestinal: positive for abdominal pain, negative for nausea,negative for vomiting, negative for diarrhea, and negative for constipation     All other systems were reviewed with the patient and are negative unless otherwise stated in HPI      OBJECTIVE:      Vitals:   Vitals:    01/02/24 0622   BP:    Pulse:    Resp:    Temp:    SpO2: 95%     Weight - Scale: 44.3 kg (97 lb 9.6 oz) (patient's last weight states 12-30-23)   Height: 162.6 cm (5' 4\")     Weight  Wt Readings from Last 3 Encounters:   01/02/24 44.3 kg (97 lb 9.6 oz)     Body mass index is 16.75 kg/m².    24HR INTAKE/OUTPUT:      Intake/Output Summary (Last 24 hours) at 1/2/2024 0656  Last data filed at 1/2/2024 0441  Gross per 24 hour   Intake 2858 ml   Output 2325 ml   Net 533 ml     -----------------------------------------------------------------  Exam:    GEN:    Awake, alert and oriented x3.   EYES:  EOMI, pupils equal   NECK: Supple. No lymphadenopathy.  No carotid bruit  CVS:    regular rate and rhythm, no audible murmur  PULM:  diminished but clear without wheezing, rales or rhonchi, no acute respiratory distress  ABD:    Bowels sounds hypoactive.  Abdomen is soft.  No distention.  no tenderness to palpation. Dressing CDI and abdominal binder in place  EXT:   no edema bilaterally .  No calf tenderness.   NEURO: Moves all extremities.  Motor and sensory are grossly intact  SKIN:  No rashes.  No skin lesions.    -----------------------------------------------------------------    Diagnostic Data:      Complete Blood Count:   Recent Labs

## 2024-01-02 NOTE — PROGRESS NOTES
Spiritual Services Interventions  0333/0333-01   1/2/2024  Aidee August    Grace Gonzalez  78 y.o. year old female    Encounter Summary  Encounter Overview/Reason : (P) Initial Encounter  Service Provided For:: (P) Patient and family together  Referral/Consult From:: (P) Rounding  Support System: (P) Children  Last Encounter : (P) 01/02/24  Complexity of Encounter: (P) Moderate  Begin Time: (P) 1345  End Time : (P) 1400  Total Time Calculated: (P) 15 min     Spiritual/Emotional needs  Type: (P) Spiritual Support                    Assessment/Intervention/Outcome  Assessment: (P) Calm  Intervention: (P) Active listening, Discussed illness injury and it’s impact, Prayer (assurance of)/[place]  Outcome: (P) Encouraged, Comfort, Engaged in conversation (Pt's daughter shared that today was a hard day for pt and her daughter, they were appreciative and encouraged by our visit together and would enjoy a follow up visit tomorrow)

## 2024-01-02 NOTE — PROGRESS NOTES
Patient resting comfortably at this time with family at bedside. Patient denies any pain and denies any other needs at this time. Patient alert & oriented x4 and able to answer all questions appropriately and follow commands. Assessment and vitals as charted. Chair locked, gripper socks on, call light within reach and able to use appropriately. Will continue to monitor this shift.

## 2024-01-02 NOTE — PROGRESS NOTES
Occupational Therapy  Facility/Department: [facility] MED SURG  Reassessment / Daily Treatment Note   NAME: Grace Gonzalez  : 1945  MRN: [id number]    Date of Service: 2024    Discharge Recommendations:  Continue to assess pending progress, Subacute/Skilled Nursing Facility         Patient Diagnosis(es): The primary encounter diagnosis was Small bowel obstruction (HCC). A diagnosis of Other specified intestinal obstruction, unspecified whether partial or complete (HCC) was also pertinent to this visit.     Assessment    OT reassessment completed due to [facility]. Continue with POC.   Activity Tolerance: Patient tolerated evaluation without incident;Patient limited by pain  Discharge Recommendations: Continue to assess pending progress;Subacute/Skilled Nursing Facility      Plan   Occupational Therapy Plan  Times Per Day: Once a day  Days Per Week: 7 Days  Current Treatment Recommendations: Strengthening;Balance training;Functional mobility training;Endurance training;Safety education & training;Patient/Caregiver education & training;Positioning;Self-Care / ADL;Home management training     Restrictions  Restrictions/Precautions  Restrictions/Precautions: NPO;General Precautions;Fall Risk;Up as Tolerated    Subjective   Subjective  Subjective: Decnied OT re-assessment upon initial attempt, however, agreeable upon 2nd attempt.  Pain: Patient with c/o pain in L arm d/t IV/meds.  Orientation  Overall Orientation Status: Within Functional Limits  Pain: Denies  Cognition  Overall Cognitive Status: WFL        Objective         ADL  Feeding Skilled Clinical Factors: Pt had NG tube connected, NPO at this time.  Grooming: Minimal assistance  UE Bathing: Minimal assistance  LE Bathing: Moderate assistance  UE Dressing: Minimal assistance  LE Dressing: Moderate assistance  Toileting: Minimal assistance  Functional Mobility: Contact guard assistance  Functional Mobility Skilled Clinical Factors:

## 2024-01-02 NOTE — PROGRESS NOTES
Comprehensive Nutrition Assessment    Type and Reason for Visit:  Initial    Nutrition Recommendations/Plan:   Peripheral PN recommended  Monitor PO progression     Malnutrition Assessment:  Malnutrition Status:  Severe malnutrition (01/02/24 0831)    Context:  Acute Illness     Findings of the 6 clinical characteristics of malnutrition:  Energy Intake:  50% or less of estimated energy requirements for 5 or more days  Weight Loss:  No significant weight loss     Body Fat Loss:  Moderate body fat loss Fat Overlying Ribs, Buccal region, Orbital   Muscle Mass Loss:  Moderate muscle mass loss Hand (interosseous), Clavicles (pectoralis & deltoids), Temples (temporalis)  Fluid Accumulation:  Moderate to Severe Extremities   Strength:  Not Performed    Nutrition Assessment:    Severe acute on chronic moderate malnutrition r/t inadequate nutrient intakes, AEB moderate fat and muscle losses with lower acute PO intakes pta.  Hypoactive b/s without flatus (family reports passing flatus is rare for her). Some elevated glucose with D5 1/2 NS for hydration and stressed state. Lethargic (more than usual for for her per daughter).  ~200 ml NGT out overnight. If cannot initiate and advance PO, need to consider PN support at low rate with her high refeeding risk. PN (peripheral) recommendations:55 ml/hr Clinimix 4.25/5% with 5.5 ml/hr IL 20% x 12 hours to provide 580 calories (13 kavon/kg), 56 g AA (1.26 g/kg AA). Noted Mg++ wnl, without PO4 available.    Nutrition Related Findings:    moderate fat and muscle losses. NGT suction. Hypoactive b/s. Wound Type: Surgical Incision       Current Nutrition Intake & Therapies:    Average Meal Intake: NPO  Average Supplements Intake: NPO  Current Parenteral Nutrition Orders:  Type and Formula: Premix Peripheral   Lipids: Daily  Duration: Continuous  Rate/Volume: 55 ml/hr Clinimix 4.25/5% with 5.5 ml/hr IL 20% x 12 hours  Current PN Order Provides: 580 calories (13 kavon/kg), 56 g AA (1.26 g/kg

## 2024-01-02 NOTE — PROGRESS NOTES
RESPIRATORY ASSESSMENT PROTOCOL                                                                                              Patient Name: Grace Gonzalez Room#: 0333/0333-01 : 1945     Admitting diagnosis: Small bowel obstruction (HCC) [K56.609]       Medical History:   Past Medical History:   Diagnosis Date    Arthritis     Asthma     Blood circulation, collateral     Cancer (HCC)     COPD (chronic obstructive pulmonary disease) (HCC)     Pneumonia        PATIENT ASSESSMENT    LABORATORY DATA  Hematology:   Lab Results   Component Value Date/Time    WBC 9.8 2024 05:00 AM    RBC 4.19 2024 05:00 AM    HGB 12.7 2024 05:00 AM    HCT 38.2 2024 05:00 AM     2024 05:00 AM     Chemistry:  No results found for: \"PHART\", \"IYW0THG\", \"PO2ART\", \"H7PNBMGN\", \"ROF7CIY\", \"PBEA\", \"NBEA\"    VITALS  Pulse: 88   Respirations: 16  BP: (!) 100/47  SpO2: 96 % O2 Device: Nasal cannula  Temp: 97.1 °F (36.2 °C)    SKIN COLOR  [x] Normal  [] Pale  [] Dusky  [] Cyanotic    RESPIRATORY PATTERN  [] Normal  [x] Dyspnea  [] Cheyne-Esparza  [] Kussmaul  [] Biots    AMBULATORY  [] Yes  [x] No  [] With Assistance      Patient Acuity 0 1 2 3 4 Score   Level of Consciousness (LOC) [x]  Alert & Oriented or Pt normal LOC []  Confused;follows directions []  Confused & uncooper-ative []  Obtunded []  Comatose 0   Respiratory Rate  (RR) []  Reg. rate & pattern. 12 - 20 bpm  []  Increased RR. Greater than 20 bpm   [x]  SOB w/ exertion or RR greater than 24 bpm []  Access- ory muscle use at rest. Abn.  resp. []  SOB at rest.   2   Bilateral Breath Sounds (BBS) []  Clear []  Diminish-ed bases  []  Diminish-ed t/o, or rales   [x]  Sporadic, scattered wheezes or rhonchi []  Persistentwheezes and, or absent BBS 3   Cough []  Strong, effective, & non-prod. []  Effective & prod. Less than 25 ml (2 TBSP) over past 24 hrs []  Ineffective & non-prod to less than 25 ML over past 24 hrs [x]  Ineffective and, or greater than

## 2024-01-02 NOTE — PROGRESS NOTES
Vitals and assessment complete. See flowsheets for details. Patient is resting in bed. No distress noted. Patient denies further needs at this time. Family is at bedside. Care ongoing.

## 2024-01-03 ENCOUNTER — APPOINTMENT (OUTPATIENT)
Dept: GENERAL RADIOLOGY | Age: 79
DRG: 335 | End: 2024-01-03
Payer: MEDICARE
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
## 2024-01-03 PROBLEM — J18.9 HAP (HOSPITAL-ACQUIRED PNEUMONIA): Status: ACTIVE | Noted: 2024-01-03

## 2024-01-03 LAB
ALBUMIN SERPL-MCNC: 2.9 G/DL (ref 3.5–5.2)
ALBUMIN/GLOB SERPL: 1.1 {RATIO} (ref 1–2.5)
ALP SERPL-CCNC: 56 U/L (ref 35–104)
ALT SERPL-CCNC: 7 U/L (ref 5–33)
ANION GAP SERPL CALCULATED.3IONS-SCNC: 10 MMOL/L (ref 9–17)
AST SERPL-CCNC: 9 U/L
BASOPHILS # BLD: 0 K/UL (ref 0–0.2)
BASOPHILS NFR BLD: 0 % (ref 0–2)
BILIRUB SERPL-MCNC: 1.1 MG/DL (ref 0.3–1.2)
BUN SERPL-MCNC: 13 MG/DL (ref 8–23)
BUN/CREAT SERPL: 33 (ref 9–20)
CALCIUM SERPL-MCNC: 8.1 MG/DL (ref 8.6–10.4)
CHLORIDE SERPL-SCNC: 104 MMOL/L (ref 98–107)
CO2 SERPL-SCNC: 24 MMOL/L (ref 20–31)
CREAT SERPL-MCNC: 0.4 MG/DL (ref 0.5–0.9)
EOSINOPHIL # BLD: 0 K/UL (ref 0–0.44)
EOSINOPHILS RELATIVE PERCENT: 0 % (ref 1–4)
ERYTHROCYTE [DISTWIDTH] IN BLOOD BY AUTOMATED COUNT: 13.3 % (ref 11.8–14.4)
GFR SERPL CREATININE-BSD FRML MDRD: >60 ML/MIN/1.73M2
GLUCOSE SERPL-MCNC: 152 MG/DL (ref 70–99)
HCT VFR BLD AUTO: 34.4 % (ref 36.3–47.1)
HGB BLD-MCNC: 11.4 G/DL (ref 11.9–15.1)
IMM GRANULOCYTES # BLD AUTO: 0 K/UL (ref 0–0.3)
IMM GRANULOCYTES NFR BLD: 0 %
LYMPHOCYTES NFR BLD: 1.21 K/UL (ref 1.1–3.7)
LYMPHOCYTES RELATIVE PERCENT: 12 % (ref 24–43)
MAGNESIUM SERPL-MCNC: 1.9 MG/DL (ref 1.6–2.6)
MCH RBC QN AUTO: 30.1 PG (ref 25.2–33.5)
MCHC RBC AUTO-ENTMCNC: 33.1 G/DL (ref 28.4–34.8)
MCV RBC AUTO: 90.8 FL (ref 82.6–102.9)
MONOCYTES NFR BLD: 1.01 K/UL (ref 0.1–1.2)
MONOCYTES NFR BLD: 10 % (ref 3–12)
MORPHOLOGY: ABNORMAL
MORPHOLOGY: ABNORMAL
NEUTROPHILS NFR BLD: 78 % (ref 36–65)
NEUTS SEG NFR BLD: 7.88 K/UL (ref 1.5–8.1)
NRBC BLD-RTO: 0 PER 100 WBC
PLATELET # BLD AUTO: 212 K/UL (ref 138–453)
PMV BLD AUTO: 8.8 FL (ref 8.1–13.5)
POTASSIUM SERPL-SCNC: 3.4 MMOL/L (ref 3.7–5.3)
PROT SERPL-MCNC: 5.6 G/DL (ref 6.4–8.3)
RBC # BLD AUTO: 3.79 M/UL (ref 3.95–5.11)
SODIUM SERPL-SCNC: 138 MMOL/L (ref 135–144)
WBC OTHER # BLD: 10.1 K/UL (ref 3.5–11.3)

## 2024-01-03 PROCEDURE — 2580000003 HC RX 258: Performed by: NURSE PRACTITIONER

## 2024-01-03 PROCEDURE — 83735 ASSAY OF MAGNESIUM: CPT

## 2024-01-03 PROCEDURE — 6360000002 HC RX W HCPCS: Performed by: NURSE PRACTITIONER

## 2024-01-03 PROCEDURE — 36415 COLL VENOUS BLD VENIPUNCTURE: CPT

## 2024-01-03 PROCEDURE — 6360000002 HC RX W HCPCS: Performed by: INTERNAL MEDICINE

## 2024-01-03 PROCEDURE — 02HV33Z INSERTION OF INFUSION DEVICE INTO SUPERIOR VENA CAVA, PERCUTANEOUS APPROACH: ICD-10-PCS | Performed by: SURGERY

## 2024-01-03 PROCEDURE — 85025 COMPLETE CBC W/AUTO DIFF WBC: CPT

## 2024-01-03 PROCEDURE — 94669 MECHANICAL CHEST WALL OSCILL: CPT

## 2024-01-03 PROCEDURE — 94640 AIRWAY INHALATION TREATMENT: CPT

## 2024-01-03 PROCEDURE — 2000000000 HC ICU R&B

## 2024-01-03 PROCEDURE — 6360000002 HC RX W HCPCS: Performed by: SPECIALIST

## 2024-01-03 PROCEDURE — 2580000003 HC RX 258: Performed by: SPECIALIST

## 2024-01-03 PROCEDURE — 97116 GAIT TRAINING THERAPY: CPT

## 2024-01-03 PROCEDURE — 2700000000 HC OXYGEN THERAPY PER DAY

## 2024-01-03 PROCEDURE — 71045 X-RAY EXAM CHEST 1 VIEW: CPT

## 2024-01-03 PROCEDURE — 94761 N-INVAS EAR/PLS OXIMETRY MLT: CPT

## 2024-01-03 PROCEDURE — 80053 COMPREHEN METABOLIC PANEL: CPT

## 2024-01-03 PROCEDURE — 94664 DEMO&/EVAL PT USE INHALER: CPT

## 2024-01-03 PROCEDURE — 97535 SELF CARE MNGMENT TRAINING: CPT

## 2024-01-03 PROCEDURE — C9113 INJ PANTOPRAZOLE SODIUM, VIA: HCPCS | Performed by: SPECIALIST

## 2024-01-03 PROCEDURE — A4216 STERILE WATER/SALINE, 10 ML: HCPCS | Performed by: SPECIALIST

## 2024-01-03 PROCEDURE — 97110 THERAPEUTIC EXERCISES: CPT

## 2024-01-03 PROCEDURE — 87040 BLOOD CULTURE FOR BACTERIA: CPT

## 2024-01-03 PROCEDURE — 2500000003 HC RX 250 WO HCPCS: Performed by: SPECIALIST

## 2024-01-03 PROCEDURE — 2500000003 HC RX 250 WO HCPCS: Performed by: NURSE PRACTITIONER

## 2024-01-03 PROCEDURE — 36569 INSJ PICC 5 YR+ W/O IMAGING: CPT

## 2024-01-03 RX ORDER — SODIUM CHLORIDE 0.9 % (FLUSH) 0.9 %
5-40 SYRINGE (ML) INJECTION PRN
Status: DISCONTINUED | OUTPATIENT
Start: 2024-01-03 | End: 2024-01-09 | Stop reason: HOSPADM

## 2024-01-03 RX ORDER — SODIUM CHLORIDE 9 MG/ML
25 INJECTION, SOLUTION INTRAVENOUS PRN
Status: DISCONTINUED | OUTPATIENT
Start: 2024-01-03 | End: 2024-01-09 | Stop reason: HOSPADM

## 2024-01-03 RX ORDER — FUROSEMIDE 10 MG/ML
40 INJECTION INTRAMUSCULAR; INTRAVENOUS ONCE
Status: COMPLETED | OUTPATIENT
Start: 2024-01-03 | End: 2024-01-03

## 2024-01-03 RX ORDER — WATER 10 ML/10ML
INJECTION INTRAMUSCULAR; INTRAVENOUS; SUBCUTANEOUS
Status: DISCONTINUED
Start: 2024-01-03 | End: 2024-01-03 | Stop reason: WASHOUT

## 2024-01-03 RX ORDER — LIDOCAINE HYDROCHLORIDE 10 MG/ML
5 INJECTION, SOLUTION INFILTRATION; PERINEURAL ONCE
Status: DISCONTINUED | OUTPATIENT
Start: 2024-01-03 | End: 2024-01-09 | Stop reason: HOSPADM

## 2024-01-03 RX ORDER — DIPHENHYDRAMINE HYDROCHLORIDE 50 MG/ML
50 INJECTION INTRAMUSCULAR; INTRAVENOUS ONCE
Status: COMPLETED | OUTPATIENT
Start: 2024-01-03 | End: 2024-01-03

## 2024-01-03 RX ORDER — SODIUM CHLORIDE 0.9 % (FLUSH) 0.9 %
5-40 SYRINGE (ML) INJECTION EVERY 12 HOURS SCHEDULED
Status: DISCONTINUED | OUTPATIENT
Start: 2024-01-03 | End: 2024-01-09 | Stop reason: HOSPADM

## 2024-01-03 RX ORDER — NOREPINEPHRINE BITARTRATE 0.06 MG/ML
1-100 INJECTION, SOLUTION INTRAVENOUS CONTINUOUS
Status: DISCONTINUED | OUTPATIENT
Start: 2024-01-03 | End: 2024-01-08

## 2024-01-03 RX ORDER — 0.9 % SODIUM CHLORIDE 0.9 %
1000 INTRAVENOUS SOLUTION INTRAVENOUS ONCE
Status: COMPLETED | OUTPATIENT
Start: 2024-01-03 | End: 2024-01-03

## 2024-01-03 RX ORDER — POTASSIUM CHLORIDE 7.45 MG/ML
10 INJECTION INTRAVENOUS PRN
Status: DISCONTINUED | OUTPATIENT
Start: 2024-01-03 | End: 2024-01-09 | Stop reason: HOSPADM

## 2024-01-03 RX ADMIN — MORPHINE SULFATE 0.5 MG: 2 INJECTION, SOLUTION INTRAMUSCULAR; INTRAVENOUS at 20:17

## 2024-01-03 RX ADMIN — ALBUTEROL SULFATE 2.5 MG: 2.5 SOLUTION RESPIRATORY (INHALATION) at 07:15

## 2024-01-03 RX ADMIN — SODIUM CHLORIDE, PRESERVATIVE FREE 10 ML: 5 INJECTION INTRAVENOUS at 10:42

## 2024-01-03 RX ADMIN — PIPERACILLIN AND TAZOBACTAM 3375 MG: 3; .375 INJECTION, POWDER, LYOPHILIZED, FOR SOLUTION INTRAVENOUS at 23:38

## 2024-01-03 RX ADMIN — MORPHINE SULFATE 0.5 MG: 2 INJECTION, SOLUTION INTRAMUSCULAR; INTRAVENOUS at 22:55

## 2024-01-03 RX ADMIN — ALBUTEROL SULFATE 2.5 MG: 2.5 SOLUTION RESPIRATORY (INHALATION) at 11:42

## 2024-01-03 RX ADMIN — ALBUTEROL SULFATE 2.5 MG: 2.5 SOLUTION RESPIRATORY (INHALATION) at 20:10

## 2024-01-03 RX ADMIN — SODIUM CHLORIDE 1000 ML: 9 INJECTION, SOLUTION INTRAVENOUS at 17:03

## 2024-01-03 RX ADMIN — POTASSIUM CHLORIDE, DEXTROSE MONOHYDRATE AND SODIUM CHLORIDE: 150; 5; 450 INJECTION, SOLUTION INTRAVENOUS at 00:35

## 2024-01-03 RX ADMIN — ENOXAPARIN SODIUM 30 MG: 100 INJECTION SUBCUTANEOUS at 10:41

## 2024-01-03 RX ADMIN — DIPHENHYDRAMINE HYDROCHLORIDE 50 MG: 50 INJECTION, SOLUTION INTRAMUSCULAR; INTRAVENOUS at 21:42

## 2024-01-03 RX ADMIN — WATER 2 MG: 1 INJECTION INTRAMUSCULAR; INTRAVENOUS; SUBCUTANEOUS at 15:39

## 2024-01-03 RX ADMIN — ALBUTEROL SULFATE 2.5 MG: 2.5 SOLUTION RESPIRATORY (INHALATION) at 23:51

## 2024-01-03 RX ADMIN — FUROSEMIDE 40 MG: 10 INJECTION, SOLUTION INTRAMUSCULAR; INTRAVENOUS at 08:22

## 2024-01-03 RX ADMIN — Medication 5 MCG/MIN: at 17:15

## 2024-01-03 RX ADMIN — ALBUTEROL SULFATE 2.5 MG: 2.5 SOLUTION RESPIRATORY (INHALATION) at 16:05

## 2024-01-03 RX ADMIN — ALBUTEROL SULFATE 2.5 MG: 2.5 SOLUTION RESPIRATORY (INHALATION) at 00:06

## 2024-01-03 RX ADMIN — ALBUTEROL SULFATE 2.5 MG: 2.5 SOLUTION RESPIRATORY (INHALATION) at 04:18

## 2024-01-03 RX ADMIN — PIPERACILLIN AND TAZOBACTAM 4500 MG: 4; .5 INJECTION, POWDER, FOR SOLUTION INTRAVENOUS; PARENTERAL at 11:34

## 2024-01-03 RX ADMIN — POTASSIUM CHLORIDE, DEXTROSE MONOHYDRATE AND SODIUM CHLORIDE: 150; 5; 450 INJECTION, SOLUTION INTRAVENOUS at 11:38

## 2024-01-03 RX ADMIN — PANTOPRAZOLE SODIUM 40 MG: 40 INJECTION, POWDER, FOR SOLUTION INTRAVENOUS at 10:41

## 2024-01-03 RX ADMIN — MORPHINE SULFATE 0.5 MG: 2 INJECTION, SOLUTION INTRAMUSCULAR; INTRAVENOUS at 18:09

## 2024-01-03 ASSESSMENT — PAIN DESCRIPTION - DESCRIPTORS
DESCRIPTORS: ACHING

## 2024-01-03 ASSESSMENT — PAIN DESCRIPTION - ONSET
ONSET: ON-GOING

## 2024-01-03 ASSESSMENT — PAIN SCALES - GENERAL
PAINLEVEL_OUTOF10: 6
PAINLEVEL_OUTOF10: 0
PAINLEVEL_OUTOF10: 7
PAINLEVEL_OUTOF10: 6
PAINLEVEL_OUTOF10: 1
PAINLEVEL_OUTOF10: 6
PAINLEVEL_OUTOF10: 2

## 2024-01-03 ASSESSMENT — PAIN DESCRIPTION - PAIN TYPE
TYPE: ACUTE PAIN
TYPE: SURGICAL PAIN
TYPE: SURGICAL PAIN
TYPE: ACUTE PAIN

## 2024-01-03 ASSESSMENT — PAIN DESCRIPTION - ORIENTATION
ORIENTATION: MID
ORIENTATION: MID
ORIENTATION: MID;UPPER
ORIENTATION: LOWER
ORIENTATION: MID

## 2024-01-03 ASSESSMENT — PAIN DESCRIPTION - LOCATION
LOCATION: ABDOMEN
LOCATION: BACK;ABDOMEN
LOCATION: ABDOMEN
LOCATION: CHEST;ABDOMEN
LOCATION: ABDOMEN

## 2024-01-03 ASSESSMENT — PAIN - FUNCTIONAL ASSESSMENT
PAIN_FUNCTIONAL_ASSESSMENT: ACTIVITIES ARE NOT PREVENTED

## 2024-01-03 ASSESSMENT — PAIN DESCRIPTION - FREQUENCY
FREQUENCY: CONTINUOUS
FREQUENCY: INTERMITTENT
FREQUENCY: CONTINUOUS
FREQUENCY: CONTINUOUS

## 2024-01-03 NOTE — PROGRESS NOTES
Progress Note    SUBJECTIVE:    Patient seen for f/u of Small bowel obstruction (HCC).  She resting in bed no distress. Currently on 1L per NC.  Drowsy, weak and frail.  Complains of cough and SOB     ROS:   Constitutional: negative  for fevers, and negative for chills.  Respiratory: positive for shortness of breath, positive for cough, and negative for wheezing  Cardiovascular: negative for chest pain, and negative for palpitations  Gastrointestinal: positive for abdominal pain, negative for nausea,negative for vomiting, negative for diarrhea, and negative for constipation     All other systems were reviewed with the patient and are negative unless otherwise stated in HPI      OBJECTIVE:      Vitals:   Vitals:    01/03/24 0715   BP:    Pulse:    Resp:    Temp:    SpO2: 92%     Weight - Scale: 44.3 kg (97 lb 9.6 oz) (patient's last weight states 12-30-23)   Height: 162.6 cm (5' 4\")     Weight  Wt Readings from Last 3 Encounters:   01/02/24 44.3 kg (97 lb 9.6 oz)     Body mass index is 16.75 kg/m².    24HR INTAKE/OUTPUT:      Intake/Output Summary (Last 24 hours) at 1/3/2024 0811  Last data filed at 1/3/2024 0705  Gross per 24 hour   Intake 1364.92 ml   Output 1450 ml   Net -85.08 ml     -----------------------------------------------------------------  Exam:    GEN:    Drowsy, weak and frail.    EYES:  EOMI, pupils equal   NECK: Supple. No lymphadenopathy.  No carotid bruit  CVS:    regular rate and rhythm, no audible murmur  PULM:  rhonchi throughout, no acute respiratory distress  ABD:    Bowels sounds hypoactive.  Abdomen is soft.  No distention.  no tenderness to palpation. Dressing CDI and abdominal binder in place.  NG draining bile  EXT:   no edema bilaterally .  No calf tenderness.   NEURO: Moves all extremities.  Motor and sensory are grossly intact  SKIN:  No rashes.  No skin lesions.    -----------------------------------------------------------------    Diagnostic Data:      Complete Blood Count:

## 2024-01-03 NOTE — PROGRESS NOTES
Pharmacy Note - Extended Infusion Beta-Lactam Adjustment    Piperacillin/Tazobactam 3375mg Q6h for treatment of Hospital acquired pneumonia. Per [facility] Extended Infusion Beta-Lactam Policy, piperacillin/tazobactam will be changed to 4500mg loading dose followed by 3375mg Q8h extended infusion    Estimated Creatinine Clearance: Estimated Creatinine Clearance: 84 mL/min (A) (based on SCr of 0.4 mg/dL (L)).    BMI: Body mass index is 16.75 kg/m².    Please call with any questions.    Thank you,    Paola Bingham, [facility]

## 2024-01-03 NOTE — PROGRESS NOTES
Physician Progress Note      PATIENT:               MITCHELL BELLAMY  CSN #:                  [id number]  :                       1945  ADMIT DATE:       2023 7:55 PM  DISCH DATE:  RESPONDING  PROVIDER #:        Armand Longoria MD          QUERY TEXT:    Patient admitted with SBO due to adhesions.  Noted to have poor po intake and   dietician assessment with severe malnutrition diagnosis. If possible, please   document in progress notes and discharge summary if you are evaluating and /or   treating any of the following:    The medical record reflects the following:    Risk Factors: poor po intake  Clinical Indicators: severe malnutrition per dietician per AND/ASPEN   guidelines as evidenced by Energy Intake:  50% or less of estimated energy   requirements for 5 or more days, Moderate body fat loss Fat Overlying Ribs,   Buccal region, Orbital ,  Moderate muscle mass loss Hand (interosseous),   Clavicles (pectoralis & deltoids), Temples (temporalis), Fluid Accumulation:    Moderate to Severe Extremities  Treatment: Dietician consult and recommendations for peripheral PN    ASPEN Criteria:    https://aspenjournals.onlinelibrary.peters.com/doi/full/10.1177/934187506194592  5    Thank you!    Juan Carlos Fountain, BSN,RN, CRCR  RN Clinical   Options provided:  -- Severe protein calorie malnutrition  -- Other - I will add my own diagnosis  -- Disagree - Not applicable / Not valid  -- Disagree - Clinically unable to determine / Unknown  -- Refer to Clinical Documentation Reviewer    PROVIDER RESPONSE TEXT:    This patient has severe protein calorie malnutrition.    Query created by: Juan Carlos Fountain on 2024 4:58 PM      QUERY TEXT:    Pt admitted with SBO due to adhesions and underwent exploratory laparotomy and   lysis of adhesions.  Patient noted to have pules ox drop and RR up to 33   requiring 1-3 L of oxygen per NC.  If possible, please document in the   progress notes and discharge  summary if you are evaluating and/or treating any   of the following:    The medical record reflects the following:    Risk Factors: COPD, recent surgery/anesthesia  Clinical Indicators: patient requiring O2 since surgery 1-3 L NC, pulse ox   dropped to 85% on 2 L NC and 87% on 2.5 L, RR up to 33  Treatment: supplemental O2 1-3 L NC    Thank you!    Juan Carlos Fountain, BSN,RN, CRCR  RN Clinical   Options provided:  -- Acute pulmonary insufficiency following surgery  -- Acute respiratory failure due to COPD, and unrelated to surgery  -- Acute respiratory failure due to the surgery  -- Other - I will add my own diagnosis  -- Disagree - Not applicable / Not valid  -- Disagree - Clinically unable to determine / Unknown  -- Refer to Clinical Documentation Reviewer    PROVIDER RESPONSE TEXT:    This patient is in acute respiratory failure due to the surgery.    Query created by: Juan Carlos Fountain on 1/2/2024 4:58 PM      Electronically signed by:  Armand Longoria MD 1/3/2024 8:05 AM

## 2024-01-03 NOTE — PROCEDURES
Midline placement note:   Dynamic Access RN    Prescribed IV Therapy = protonix iv, d5 1/2NS with 20 K+  Peripheral ultrasound assessment done. Plan for basilic vein insertion.   CVR measurement = 33 % (Linear CVR is preferred to be less than 45%).  Product type: Bard 4 fr midline  History/Labs/Allergies Reviewed  Placed By: Fabian Linares RN (Dynamic Access)  Time out Performed using Two Identifiers  Lot # [id number]  Expiration date = 12/31/2024  Trimmed at 16 cm  External catheter length 0 cm  Number of attempts 1  Special equipment used - Ultrasound and an MST insertion technique  Catheter securement = 3M securement device  Dressing applied= Tegaderm CHG  Lidocaine administered intradermally conc.1%, approx 1ml. (Lot# [id number] and Exp date= 03/31/2026)  Device should have blood return, if no blood return assess for infiltration and/or call VAT for consult.  RN aware midline placed and is immediately released for use. No cxr required due to placement of a midline.     Midline education:   [ x ] Post care line insertion was discussed with patient/Family or POA prior to procedure.  Risks, benefits, alternatives, and reason for procedure were discussed and teaching was reinforced. An educational handout on post insertion line care and maintenance was left at bedside with patient or in chart. Patient (Family or POA) acknowledged understanding of information relayed.      [  ] Post care of line insertion was not discussed with patient/family or POA due to pts medical status at time of procedure. Patient's family or POA not available to discuss line education. An educational handout on post insertion line care and maintenance was left at bedside or in chart.        Upload picture of vessel    Routine care   Diet discussed   Refused flu

## 2024-01-03 NOTE — PROGRESS NOTES
New orders placed per verbal order from Dr. Longoria who is at bedside. Writer called respiratory and requested albuterol treatment per Dr. Longoria as well.

## 2024-01-03 NOTE — PROGRESS NOTES
Vitals and assessment complete at this time. See flowsheets for details. Patient denies further needs at this time. Call light is within reach. Care ongoing.

## 2024-01-03 NOTE — PROGRESS NOTES
Postoperative day #3  Patient appears to be quite exhausted and she is noncompliant with her pulmonary toilet she continues to have a moderate amount of NG output and denies passage of flatus or stool  Vital signs are stable she is afebrile  Chest coarse rhonchi bilaterally with the patient is unable to clear her own upper airway  Cardiovascular regular rate and rhythm  Abdomen is soft continued incisional discomfort the incision is clean and dry  Musculoskeletal negative Homans  Neurologically without focal findings    Laboratories potassium 3.4 BUN and creatinine of 13 and 0.4 calcium of 8.1 magnesium of 1.9 glucose 150 albumin 2.9 white count of 10,000 H&H of 11 and 34  Portable chest x-ray may show a left lower lobe developing infiltrate however it is difficult to discern given the patient's left breast implant overshadowing the area    Assessment and plan given the purported increase in an infiltrate in the left lower lobe patient has been started empirically on piperacillin and tazobactam I would continue nasogastric tube at this time continue to encourage pulmonary toilet and ambulation until the patient's postoperative ileus resolves in the interim we will institute vibratory percussion and postural drainage to assist in the patient's mobilization of her pulmonary secretions

## 2024-01-03 NOTE — PROGRESS NOTES
Physical Therapy  Facility/Department: [facility] MED SURG  Daily Treatment Note  NAME: Grace Gonzalez  : 1945  MRN: [id number]    Date of Service: 1/3/2024    Discharge Recommendations:  Continue to assess pending progress     Patient Diagnosis(es): The primary encounter diagnosis was Small bowel obstruction (HCC). A diagnosis of Other specified intestinal obstruction, unspecified whether partial or complete (HCC) was also pertinent to this visit.    Assessment   Assessment: Pt. declined OOB at this itme, RN aware. Pt. agreeable to bed exercises, very fatigued with decreased endurance and required AAROM and tactile cues through out exercises. Performed supine exercises B LE x15.  Activity Tolerance: Patient limited by endurance;Patient limited by fatigue     Plan    Physical Therapy Plan  General Plan: 2 times a day 7 days a week  Specific Instructions for Next Treatment: 1x/daily on weekends and holidays  Current Treatment Recommendations: Strengthening;Balance training;Gait training;Home exercise program;Therapeutic activities;Safety education & training;Neuromuscular re-education;Functional mobility training;Stair training;Transfer training;Patient/Caregiver education & training;Endurance training;Pain management     Restrictions  Restrictions/Precautions  Restrictions/Precautions: NPO, General Precautions, Fall Risk, Up as Tolerated  Required Braces or Orthoses?: No  Lower Extremity Weight Bearing Restrictions  Right Lower Extremity Weight Bearing: Weight Bearing As Tolerated     Subjective    Subjective  Subjective: Pt. in bed upon arrival, agreeable to therapy at this time  Pain: does no report when asked  Orientation  Overall Orientation Status: Within Functional Limits     Objective   Bed Mobility Training  Bed Mobility Training: No  Transfer Training  Transfer Training: No  Gait Training  Gait Training: No  PT Exercises  Exercise Treatment: Supine exercises B LE x 15 with AAROM and tactile

## 2024-01-03 NOTE — PROGRESS NOTES
Occupational Therapy  Facility/Department: [facility] MED SURG  Daily Treatment Note  NAME: Grace Gonzalez  : 1945  MRN: [id number]    Date of Service: 1/3/2024    Discharge Recommendations:  Continue to assess pending progress, Subacute/Skilled Nursing Facility         Patient Diagnosis(es): The primary encounter diagnosis was Small bowel obstruction (HCC). A diagnosis of Other specified intestinal obstruction, unspecified whether partial or complete (HCC) was also pertinent to this visit.     Assessment    Activity Tolerance: Patient limited by endurance  Discharge Recommendations: Continue to assess pending progress;Subacute/Skilled Nursing Facility      Plan   Occupational Therapy Plan  Times Per Day: Once a day  Days Per Week: 7 Days  Current Treatment Recommendations: Strengthening;Balance training;Functional mobility training;Endurance training;Safety education & training;Patient/Caregiver education & training;Positioning;Self-Care / ADL;Home management training     Restrictions   NPO, general fall risk    Subjective   Subjective  Subjective: Pt supine in bed, agreed to washin gup EOB and transfer to chair.  Pain: abdominal pain, did not rate, pt very weak and slow moving           Objective    Vitals           ADL  Feeding: NPO  Grooming: Minimal assistance  UE Bathing: Minimal assistance  LE Bathing: Moderate assistance  UE Dressing: Minimal assistance  LE Dressing: Moderate assistance  Toileting: Minimal assistance  Functional Mobility: Contact guard assistance  Functional Mobility Skilled Clinical Factors: FWW  Additional Comments: CGA ADL transfers, pt limited by endurance and weakness.        Safety Devices  Type of Devices: All fall risk precautions in place;Chair alarm in place;Left in chair;Call light within reach     Patient Education  Education Given To: Patient  Education Provided: Role of Therapy;Plan of Care;ADL Adaptive Strategies;Transfer Training  Education Method: Verbal  Barriers to

## 2024-01-03 NOTE — RT PROTOCOL NOTE
RESPIRATORY ASSESSMENT PROTOCOL                                                                                              Patient Name: Grace Gonzalez Room#: I307/I307-01 : 1945     Admitting diagnosis: Small bowel obstruction (HCC) [K56.609]       Medical History:   Past Medical History:   Diagnosis Date    Arthritis     Asthma     Blood circulation, collateral     Cancer (HCC)     COPD (chronic obstructive pulmonary disease) (HCC)     HAP (hospital-acquired pneumonia) 2024    Pneumonia        PATIENT ASSESSMENT    LABORATORY DATA  Hematology:   Lab Results   Component Value Date/Time    WBC 10.1 2024 07:05 AM    RBC 3.79 2024 07:05 AM    HGB 11.4 2024 07:05 AM    HCT 34.4 2024 07:05 AM     2024 07:05 AM     Chemistry:  No results found for: \"PHART\", \"NLS3HTE\", \"PO2ART\", \"L1LSZEVK\", \"LNU8PPP\", \"PBEA\", \"NBEA\"    VITALS  Pulse: 81 (Simultaneous filing. User may not have seen previous data.)   Respirations: 28 (Simultaneous filing. User may not have seen previous data.)  BP: (!) 102/33 (Simultaneous filing. User may not have seen previous data.)  SpO2: 94 % (Simultaneous filing. User may not have seen previous data.) O2 Device: Nasal cannula  Temp: 98.8 °F (37.1 °C)    SKIN COLOR  [] Normal  [x] Pale  [] Dusky  [] Cyanotic    RESPIRATORY PATTERN  [] Normal  [x] Dyspnea  [] Cheyne-Esparza  [] Kussmaul  [] Biots    AMBULATORY  [] Yes  [] No  [x] With Assistance          Patient Acuity 0 1 2 3 4 Score   Level of Consciousness (LOC) [x]  Alert & Oriented or Pt normal LOC []  Confused;follows directions []  Confused & uncooper-ative []  Obtunded []  Comatose 0   Respiratory Rate  (RR) []  Reg. rate & pattern. 12 - 20 bpm  []  Increased RR. Greater than 20 bpm   [x]  SOB w/ exertion or RR greater than 24 bpm []  Access- ory muscle use at rest. Abn.  resp. []  SOB at rest.   2   Bilateral Breath Sounds (BBS) []  Clear []  Diminish-ed bases  []  Diminish-ed t/o, or  can use MP.  Notify physician if condition deteriorates. MDI THERAPY with  2 actuations of [physician-ordered bronchodilator(s)] via spacer TID Albuterol and PRN q4 hrs.   If unable to utilize MDI: HHN [physician-ordered bronchodilator(s)] TID and Albuterol PRN q4 hrs.   Notify physician if condition deteriorates. MDI THERAPY with  [physician-ordered bronchodilator(s)] via spacer TID PRN.   If unable to utilize MDI: HHN [physician-ordered bronchodilator(s)] TID PRN.   Notify physician if condition deteriorates.         If Acuity Level is 2, 3, or 4 in any of the following:    [x] COUGH     [] SURGICAL HISTORY (SURG HX)  [x] CHEST XRAY (CXR)    Goal: Improvement in sputum mobilization in patients with ineffective airway clearance. Reverse atelectasis.    [x] Bronchopulmonary Hygiene Protocol      Total Acuity:   14-28  [x]  Secondary Assessment in 24 hrs Total Acuity:  9-13  []  Secondary Assessment in 24 hrs Total Acuity:  4-8  []  Secondary Assessment in 24 hrs Total Acuity:  0-3  []  Secondary Assessment in 48 hrs   METANEB QID with [physician-ordered bronchodilator(s)] if CXR Acuity = 4; otherwise:  PD&P, Oscillatory Therapy, or Vest QID & PRN AND PEP QID & PRN  NT Sxn PRN for ineffective cough  METANEB QID with [physician-ordered bronchodilator(s)] if CXR Acuity = 4; otherwise:  PD&P, Oscillatory Therapy or Vest QID & PRN AND PEP QID & PRN  NT Sxn PRN for ineffective cough  PD&P, Oscillatory Therapy, or Vest TID & PRN AND PEP TID & PRN   Instruct patient to self-perform IS q1hr WA       If Acuity Level is 2 or above in the following:    [] PULMONARY HISTORY (PULM HX)    Goal: Assist patient in quitting smoking to slow or stop the progression of lung disease.    [] Smoking Cessation Protocol    SMOKING CESSATION EDUCATION provided according to policy RT_201: (tatiana with an X)  ____Yes    ____ No     ____ NA    Smoking Cessation Booklet given:  ____Yes  ____No ____Patient Refused

## 2024-01-03 NOTE — PROGRESS NOTES
Physical Therapy  Facility/Department: [facility] MED SURG  Daily Treatment Note  NAME: Grace Gonzalez  : 1945  MRN: [id number]    Date of Service: 1/3/2024    Discharge Recommendations:  Continue to assess pending progress     Patient Diagnosis(es): The primary encounter diagnosis was Small bowel obstruction (HCC). A diagnosis of Other specified intestinal obstruction, unspecified whether partial or complete (HCC) was also pertinent to this visit.    Assessment   Assessment: Transfers:CGA. Bed mobility:CGA/Min A. Gait with WW 8ftx1 with slow tenzin and forward flexed posture. Educated pt. and family on modalities for decreased back pain.  Activity Tolerance: Patient limited by endurance;Patient limited by fatigue     Plan    Physical Therapy Plan  General Plan: 2 times a day 7 days a week  Specific Instructions for Next Treatment: 1x/daily on weekends and holidays  Current Treatment Recommendations: Strengthening;Balance training;Gait training;Home exercise program;Therapeutic activities;Safety education & training;Neuromuscular re-education;Functional mobility training;Stair training;Transfer training;Patient/Caregiver education & training;Endurance training;Pain management     Restrictions  Restrictions/Precautions  Restrictions/Precautions: NPO, General Precautions, Fall Risk, Up as Tolerated  Required Braces or Orthoses?: No  Lower Extremity Weight Bearing Restrictions  Right Lower Extremity Weight Bearing: Weight Bearing As Tolerated     Subjective    Subjective  Subjective: Pt. in chair upon arrival and wanting to get back to bed, family present.  Pain: increased pain with movement does not rate.  Orientation  Overall Orientation Status: Within Functional Limits     Objective   Bed Mobility Training  Bed Mobility Training: Yes  Overall Level of Assistance: Contact-guard assistance;Minimum assistance;Assist X1  Interventions: Safety awareness training;Verbal cues  Rolling: Minimum assistance;Assist

## 2024-01-04 ENCOUNTER — APPOINTMENT (OUTPATIENT)
Dept: GENERAL RADIOLOGY | Age: 79
DRG: 335 | End: 2024-01-04
Payer: MEDICARE

## 2024-01-04 ENCOUNTER — APPOINTMENT (OUTPATIENT)
Dept: VASCULAR LAB | Age: 79
DRG: 335 | End: 2024-01-04
Payer: MEDICARE

## 2024-01-04 PROBLEM — Z72.0 TOBACCO ABUSE DISORDER: Status: ACTIVE | Noted: 2024-01-04

## 2024-01-04 PROBLEM — J44.9 COPD WITH HYPOXIA (HCC): Status: ACTIVE | Noted: 2024-01-02

## 2024-01-04 LAB
ALBUMIN SERPL-MCNC: 2.6 G/DL (ref 3.5–5.2)
ALBUMIN/GLOB SERPL: 1.1 {RATIO} (ref 1–2.5)
ALP SERPL-CCNC: 57 U/L (ref 35–104)
ALT SERPL-CCNC: 6 U/L (ref 5–33)
ANION GAP SERPL CALCULATED.3IONS-SCNC: 8 MMOL/L (ref 9–17)
ARTERIAL PATENCY WRIST A: ABNORMAL
AST SERPL-CCNC: 9 U/L
BASOPHILS # BLD: 0 K/UL (ref 0–0.2)
BASOPHILS NFR BLD: 0 % (ref 0–2)
BDY SITE: ABNORMAL
BILIRUB SERPL-MCNC: 1.2 MG/DL (ref 0.3–1.2)
BODY TEMPERATURE: 37
BUN SERPL-MCNC: 10 MG/DL (ref 8–23)
BUN/CREAT SERPL: 25 (ref 9–20)
CALCIUM SERPL-MCNC: 7.8 MG/DL (ref 8.6–10.4)
CHLORIDE SERPL-SCNC: 105 MMOL/L (ref 98–107)
CO2 SERPL-SCNC: 25 MMOL/L (ref 20–31)
CREAT SERPL-MCNC: 0.4 MG/DL (ref 0.5–0.9)
EOSINOPHIL # BLD: 0 K/UL (ref 0–0.44)
EOSINOPHILS RELATIVE PERCENT: 0 % (ref 1–4)
ERYTHROCYTE [DISTWIDTH] IN BLOOD BY AUTOMATED COUNT: 13.3 % (ref 11.8–14.4)
GAS FLOW.O2 O2 DELIVERY SYS: ABNORMAL L/MIN
GFR SERPL CREATININE-BSD FRML MDRD: >60 ML/MIN/1.73M2
GLUCOSE SERPL-MCNC: 155 MG/DL (ref 70–99)
HCO3 ARTERIAL: 25.4 MMOL/L (ref 22–26)
HCT VFR BLD AUTO: 31.9 % (ref 36.3–47.1)
HGB BLD-MCNC: 10.8 G/DL (ref 11.9–15.1)
IMM GRANULOCYTES # BLD AUTO: 0 K/UL (ref 0–0.3)
IMM GRANULOCYTES NFR BLD: 0 %
LYMPHOCYTES NFR BLD: 0.87 K/UL (ref 1.1–3.7)
LYMPHOCYTES RELATIVE PERCENT: 7 % (ref 24–43)
MAGNESIUM SERPL-MCNC: 1.7 MG/DL (ref 1.6–2.6)
MCH RBC QN AUTO: 30.8 PG (ref 25.2–33.5)
MCHC RBC AUTO-ENTMCNC: 33.9 G/DL (ref 28.4–34.8)
MCV RBC AUTO: 90.9 FL (ref 82.6–102.9)
MONOCYTES NFR BLD: 0.5 K/UL (ref 0.1–1.2)
MONOCYTES NFR BLD: 4 % (ref 3–12)
MORPHOLOGY: NORMAL
NEUTROPHILS NFR BLD: 89 % (ref 36–65)
NEUTS SEG NFR BLD: 11.03 K/UL (ref 1.5–8.1)
NRBC BLD-RTO: 0 PER 100 WBC
O2 SAT, ARTERIAL: 79.9 % (ref 95–98)
PCO2 ARTERIAL: 32.4 MMHG (ref 35–45)
PH ARTERIAL: 7.51 (ref 7.35–7.45)
PLATELET # BLD AUTO: 221 K/UL (ref 138–453)
PMV BLD AUTO: 8.7 FL (ref 8.1–13.5)
PO2 ARTERIAL: 39.2 MMHG (ref 80–100)
POSITIVE BASE EXCESS, ART: 3 MMOL/L (ref 0–2)
POTASSIUM SERPL-SCNC: 3.4 MMOL/L (ref 3.7–5.3)
PROT SERPL-MCNC: 5 G/DL (ref 6.4–8.3)
RBC # BLD AUTO: 3.51 M/UL (ref 3.95–5.11)
RESPIRATORY RATE: 36
SODIUM SERPL-SCNC: 138 MMOL/L (ref 135–144)
WBC OTHER # BLD: 12.4 K/UL (ref 3.5–11.3)

## 2024-01-04 PROCEDURE — 2580000003 HC RX 258: Performed by: NURSE PRACTITIONER

## 2024-01-04 PROCEDURE — 6360000002 HC RX W HCPCS: Performed by: NURSE PRACTITIONER

## 2024-01-04 PROCEDURE — 2700000000 HC OXYGEN THERAPY PER DAY

## 2024-01-04 PROCEDURE — 36600 WITHDRAWAL OF ARTERIAL BLOOD: CPT

## 2024-01-04 PROCEDURE — 2000000000 HC ICU R&B

## 2024-01-04 PROCEDURE — 97168 OT RE-EVAL EST PLAN CARE: CPT

## 2024-01-04 PROCEDURE — 94761 N-INVAS EAR/PLS OXIMETRY MLT: CPT

## 2024-01-04 PROCEDURE — 82805 BLOOD GASES W/O2 SATURATION: CPT

## 2024-01-04 PROCEDURE — 83735 ASSAY OF MAGNESIUM: CPT

## 2024-01-04 PROCEDURE — 94664 DEMO&/EVAL PT USE INHALER: CPT

## 2024-01-04 PROCEDURE — 2500000003 HC RX 250 WO HCPCS: Performed by: NURSE PRACTITIONER

## 2024-01-04 PROCEDURE — 80053 COMPREHEN METABOLIC PANEL: CPT

## 2024-01-04 PROCEDURE — 99291 CRITICAL CARE FIRST HOUR: CPT | Performed by: INTERNAL MEDICINE

## 2024-01-04 PROCEDURE — 94669 MECHANICAL CHEST WALL OSCILL: CPT

## 2024-01-04 PROCEDURE — 94640 AIRWAY INHALATION TREATMENT: CPT

## 2024-01-04 PROCEDURE — 6370000000 HC RX 637 (ALT 250 FOR IP): Performed by: NURSE PRACTITIONER

## 2024-01-04 PROCEDURE — 97530 THERAPEUTIC ACTIVITIES: CPT

## 2024-01-04 PROCEDURE — 85025 COMPLETE CBC W/AUTO DIFF WBC: CPT

## 2024-01-04 PROCEDURE — 71045 X-RAY EXAM CHEST 1 VIEW: CPT

## 2024-01-04 PROCEDURE — A4216 STERILE WATER/SALINE, 10 ML: HCPCS | Performed by: NURSE PRACTITIONER

## 2024-01-04 PROCEDURE — 74019 RADEX ABDOMEN 2 VIEWS: CPT

## 2024-01-04 PROCEDURE — 93970 EXTREMITY STUDY: CPT

## 2024-01-04 PROCEDURE — 36592 COLLECT BLOOD FROM PICC: CPT

## 2024-01-04 PROCEDURE — C9113 INJ PANTOPRAZOLE SODIUM, VIA: HCPCS | Performed by: NURSE PRACTITIONER

## 2024-01-04 PROCEDURE — 6360000002 HC RX W HCPCS

## 2024-01-04 PROCEDURE — 87040 BLOOD CULTURE FOR BACTERIA: CPT

## 2024-01-04 RX ADMIN — POTASSIUM CHLORIDE 10 MEQ: 7.46 INJECTION, SOLUTION INTRAVENOUS at 00:22

## 2024-01-04 RX ADMIN — SODIUM CHLORIDE, PRESERVATIVE FREE 10 ML: 5 INJECTION INTRAVENOUS at 19:52

## 2024-01-04 RX ADMIN — PIPERACILLIN AND TAZOBACTAM 3375 MG: 3; .375 INJECTION, POWDER, LYOPHILIZED, FOR SOLUTION INTRAVENOUS at 16:04

## 2024-01-04 RX ADMIN — ALBUTEROL SULFATE 2.5 MG: 2.5 SOLUTION RESPIRATORY (INHALATION) at 12:27

## 2024-01-04 RX ADMIN — ONDANSETRON 4 MG: 2 INJECTION INTRAMUSCULAR; INTRAVENOUS at 19:51

## 2024-01-04 RX ADMIN — ALBUTEROL SULFATE 2.5 MG: 2.5 SOLUTION RESPIRATORY (INHALATION) at 23:57

## 2024-01-04 RX ADMIN — POTASSIUM CHLORIDE 10 MEQ: 7.46 INJECTION, SOLUTION INTRAVENOUS at 03:26

## 2024-01-04 RX ADMIN — ALBUTEROL SULFATE 2.5 MG: 2.5 SOLUTION RESPIRATORY (INHALATION) at 15:44

## 2024-01-04 RX ADMIN — POTASSIUM CHLORIDE, DEXTROSE MONOHYDRATE AND SODIUM CHLORIDE: 150; 5; 450 INJECTION, SOLUTION INTRAVENOUS at 20:58

## 2024-01-04 RX ADMIN — ALBUTEROL SULFATE 2.5 MG: 2.5 SOLUTION RESPIRATORY (INHALATION) at 20:17

## 2024-01-04 RX ADMIN — POTASSIUM CHLORIDE 10 MEQ: 7.46 INJECTION, SOLUTION INTRAVENOUS at 02:24

## 2024-01-04 RX ADMIN — ENOXAPARIN SODIUM 30 MG: 100 INJECTION SUBCUTANEOUS at 08:40

## 2024-01-04 RX ADMIN — POTASSIUM CHLORIDE 10 MEQ: 7.46 INJECTION, SOLUTION INTRAVENOUS at 01:18

## 2024-01-04 RX ADMIN — PIPERACILLIN AND TAZOBACTAM 3375 MG: 3; .375 INJECTION, POWDER, LYOPHILIZED, FOR SOLUTION INTRAVENOUS at 07:27

## 2024-01-04 RX ADMIN — MORPHINE SULFATE 0.5 MG: 2 INJECTION, SOLUTION INTRAMUSCULAR; INTRAVENOUS at 23:57

## 2024-01-04 RX ADMIN — POTASSIUM CHLORIDE, DEXTROSE MONOHYDRATE AND SODIUM CHLORIDE: 150; 5; 450 INJECTION, SOLUTION INTRAVENOUS at 00:21

## 2024-01-04 RX ADMIN — Medication 1 SPRAY: at 14:36

## 2024-01-04 RX ADMIN — PANTOPRAZOLE SODIUM 40 MG: 40 INJECTION, POWDER, FOR SOLUTION INTRAVENOUS at 08:40

## 2024-01-04 RX ADMIN — ALBUTEROL SULFATE 2.5 MG: 2.5 SOLUTION RESPIRATORY (INHALATION) at 08:02

## 2024-01-04 RX ADMIN — MORPHINE SULFATE 0.5 MG: 2 INJECTION, SOLUTION INTRAMUSCULAR; INTRAVENOUS at 19:52

## 2024-01-04 RX ADMIN — ALBUTEROL SULFATE 2.5 MG: 2.5 SOLUTION RESPIRATORY (INHALATION) at 03:55

## 2024-01-04 RX ADMIN — MORPHINE SULFATE 0.5 MG: 2 INJECTION, SOLUTION INTRAMUSCULAR; INTRAVENOUS at 12:42

## 2024-01-04 RX ADMIN — PIPERACILLIN AND TAZOBACTAM 3375 MG: 3; .375 INJECTION, POWDER, LYOPHILIZED, FOR SOLUTION INTRAVENOUS at 23:45

## 2024-01-04 ASSESSMENT — PAIN DESCRIPTION - FREQUENCY
FREQUENCY: INTERMITTENT

## 2024-01-04 ASSESSMENT — PAIN SCALES - GENERAL
PAINLEVEL_OUTOF10: 0
PAINLEVEL_OUTOF10: 6
PAINLEVEL_OUTOF10: 6
PAINLEVEL_OUTOF10: 4
PAINLEVEL_OUTOF10: 6
PAINLEVEL_OUTOF10: 5
PAINLEVEL_OUTOF10: 0
PAINLEVEL_OUTOF10: 5

## 2024-01-04 ASSESSMENT — PAIN - FUNCTIONAL ASSESSMENT
PAIN_FUNCTIONAL_ASSESSMENT: ACTIVITIES ARE NOT PREVENTED

## 2024-01-04 ASSESSMENT — PAIN DESCRIPTION - PAIN TYPE
TYPE: SURGICAL PAIN;CHRONIC PAIN
TYPE: SURGICAL PAIN
TYPE: CHRONIC PAIN;SURGICAL PAIN

## 2024-01-04 ASSESSMENT — PAIN DESCRIPTION - DESCRIPTORS
DESCRIPTORS: ACHING;DISCOMFORT

## 2024-01-04 ASSESSMENT — PAIN DESCRIPTION - ORIENTATION
ORIENTATION: MID

## 2024-01-04 ASSESSMENT — PAIN DESCRIPTION - ONSET
ONSET: ON-GOING

## 2024-01-04 ASSESSMENT — PAIN DESCRIPTION - LOCATION
LOCATION: ABDOMEN;BACK
LOCATION: ABDOMEN;BACK
LOCATION: ABDOMEN
LOCATION: ABDOMEN;BACK
LOCATION: ABDOMEN;BACK

## 2024-01-04 NOTE — FLOWSHEET NOTE
Up to chair at bedside with 2 assist using a walker. Tolerated. States she feels weak. Call light within reach. Continue to monitor

## 2024-01-04 NOTE — FLOWSHEET NOTE
Resting in bed with eyes closed. Awake for vitals and assessment. States #6 pain when asked. Offered pain medication. Declines. States \" I just want to sleep.\" Offered to repositioned in bed. Refuses. Call light within reach. Bed alarm on for safety

## 2024-01-04 NOTE — CONSULTS
PULMONARY & CRITICAL CARE MEDICINE CONSULT NOTE     Patient:  Grace Gonzalez  MRN: [id number]  Admit date: 12/30/2023  Primary Care Physician: Diomedes Hall MD  Consulting Physician: Armand Longoria MD  CODE Status: Full Code   LOS: 5    HISTORY     CHIEF COMPLAINT/REASON FOR CONSULT:    Acute hypoxic respiratory failure/left lower lobe pneumonia/history of COPD.    HISTORY OF PRESENT ILLNESS:  The patient is a 78 y.o. female she has history of COPD severity of COPD is not known she had long history of smoking for 60 years 1 pack/day and has been smoking until a few days before she was admitted to hospital.  She is presented to hospital on 12/30/2023 after she had episode of constipation and vomiting and abdominal pain.  She was found to have a small bowel obstruction and had exploratory laparotomy done with lysis of additions on 12/31/2023 by general surgery.  Since surgery patient does not have return of bowel function she has an NG tube in place she has not had flatus.  She has been hypoxic her postoperative chest x-ray shows left lower lobe consolidation chest x-ray also shows hyperinflation and flattened diaphragm.  She has been requiring nasal cannula and then she was more hypoxic tried on high flow nasal cannula but apparently was not able to tolerate high flow nasal cannula and she was placed on nonrebreather with 10 L as she continued to be hypoxic.  Initial CT scan of the abdomen on 12/30/2023 did not show lower lung consolidation.  For chest x-ray on 01/02/2023 shows left lower lobe consolidation and chest x-ray repeated on 01/0/23 shows worsening consolidation and likely some atelectasis.  Chest x-ray shows severe hyperinflation and increased AP diameter  She remained tachypneic with respiratory rate of high 20s.  She had been hemodynamically stable no hypotension was reported she did not complain of chest pain.  She is very thin and cachectic.  She does have history of COPD she is not on home

## 2024-01-04 NOTE — PROGRESS NOTES
[facility] at bedside for consent for PICC line placement. Access RN explains the procedure to pt and daughter. Daughter states that pt has an allergy to lidocaine and wants to know if Benadryl can be given prior to procedure. Allergic reaction is hives and throat swelling. Writer will call doctor.

## 2024-01-04 NOTE — PROGRESS NOTES
PT refuses to wear HHFNC any longer. 4L NC applied by RN. PT continues to desat. Oxymask applied at this time, titration as needed.

## 2024-01-04 NOTE — PROGRESS NOTES
Pt resting in bed quietly awake. Daughter at bedside. Assessment and vital signs as charted. Pt c/o pain 6/10. Morphine was given. IV fluids and medications infusing as ordered through PICC line. Calderon catheter intact and draining. Nasal canula on at 3 L. RT will be up to put pt on HHFNC d/t pt's oxygen level dropping. Pt denies any other needs. Call light in reach. Bed alarm on. Will continue to monitor.

## 2024-01-04 NOTE — PROGRESS NOTES
Writer called answering service for Benadryl to pre medicate pt before Lidocaine is given for PICC line insertion per family request.

## 2024-01-04 NOTE — PROGRESS NOTES
Pt resting in bed with family at bedside. Assessment and vital signs as charted. Pt rating abd pain 2/10. Morphine to be given. Abdominal binder and dressing intact. Calderon catheter in place. IV fluids infusing as ordered. Pt denies any other needs. Call light in reach. Bed alarm on. Will continue to monitor.

## 2024-01-04 NOTE — PROGRESS NOTES
zana am labs from PICC line. Writer offered pt pain medication again but pt refused. Will continue to monitor.

## 2024-01-04 NOTE — PLAN OF CARE
Problem: Discharge Planning  Goal: Discharge to home or other facility with appropriate resources  Outcome: Progressing     Problem: Pain  Goal: Verbalizes/displays adequate comfort level or baseline comfort level  Outcome: Progressing     Problem: Safety - Adult  Goal: Free from fall injury  Outcome: Progressing     Problem: Skin/Tissue Integrity  Goal: Absence of new skin breakdown  Outcome: Progressing     Problem: Respiratory - Adult  Goal: Achieves optimal ventilation and oxygenation  Outcome: Progressing     Problem: Cardiovascular - Adult  Goal: Maintains optimal cardiac output and hemodynamic stability  Outcome: Progressing     Problem: Skin/Tissue Integrity - Adult  Goal: Skin integrity remains intact  Outcome: Progressing  Goal: Incisions, wounds, or drain sites healing without S/S of infection  Outcome: Progressing  Goal: Oral mucous membranes remain intact  Outcome: Progressing     Problem: Musculoskeletal - Adult  Goal: Return mobility to safest level of function  Outcome: Progressing     Problem: Gastrointestinal - Adult  Goal: Minimal or absence of nausea and vomiting  Outcome: Progressing  Goal: Maintains or returns to baseline bowel function  Outcome: Progressing  Goal: Maintains adequate nutritional intake  Outcome: Progressing  Goal: Establish and maintain optimal ostomy function  Outcome: Progressing     Problem: Infection - Adult  Goal: Absence of infection at discharge  Outcome: Progressing     Problem: Nutrition Deficit:  Goal: Optimize nutritional status  Outcome: Progressing     Problem: ABCDS Injury Assessment  Goal: Absence of physical injury  Outcome: Progressing

## 2024-01-04 NOTE — PROGRESS NOTES
RT relayed that pt stated she hurt all over. Writer went into room and offered to give pt pain medication. Pt refused it at this time. Will continue to monitor.

## 2024-01-04 NOTE — PROGRESS NOTES
Pt resting in bed awake when writer rounded. Pt is refusing to keep the HHFNC on and wants it taken off. Writer removed it and put the nasal canula back on at 4 L. Pt's SpO2 reading is 87% on NC. Assessment and vital signs as charted. Pt denies pain at this time. Calderon care completed at this time. Pt refused a complete bath at this time. NG tube remains in place and at LIWS. IV fluids and medications infusing as ordered. Writer will call RT to come and evaluate pt's oxygen. Pt denies any other needs and wants to rest. Call light in reach. Bed alarm on. Will continue to monitor.

## 2024-01-04 NOTE — PROGRESS NOTES
HHFNC applied to PT due to desat to 86%. PT complain of too much pressure at 40L 60%. HHFNC changed to 35L 65%.. SPO2 93%. Pt agrees to try lower pressure.

## 2024-01-04 NOTE — PROGRESS NOTES
Physical Therapy  Facility/Department: [facility] ICU  Physical Therapy Initial Assessment    Name: Grace Gonzalez  : 1945  MRN: [id number]  Date of Service: 2024    Discharge Recommendations:  Continue to assess pending progress, Subacute/Skilled Nursing Facility          Patient Diagnosis(es): The primary encounter diagnosis was Small bowel obstruction (HCC). A diagnosis of Other specified intestinal obstruction, unspecified whether partial or complete (HCC) was also pertinent to this visit.  Past Medical History:  has a past medical history of Arthritis, Asthma, Blood circulation, collateral, Cancer (HCC), COPD (chronic obstructive pulmonary disease) (HCC), HAP (hospital-acquired pneumonia), and Pneumonia.  Past Surgical History:  has a past surgical history that includes  section; Total abdominal hysterectomy w/ bilateral salpingoophorectomy; Breast surgery; eye surgery; and skin biopsy.    Assessment   Body Structures, Functions, Activity Limitations Requiring Skilled Therapeutic Intervention: Decreased functional mobility ;Decreased endurance;Increased pain;Decreased balance;Decreased high-level IADLs;Decreased strength;Decreased tolerance to work activity  Assessment: Pt is a 78 year old female required a PT re-assessment following transfer to the ICU.  Pt presents with pain limiting function and mobility. Pt required minimal assistance to move from sitting to standing and was limited with ambulation due to pain and fatigue.  She would benefit from skilled PT to address her deficits to improve functional mobility.  Treatment Diagnosis: general weakness  Therapy Prognosis: Good  Decision Making: Medium Complexity  Requires PT Follow-Up: Yes  Activity Tolerance  Activity Tolerance: Patient limited by fatigue;Patient limited by pain     Plan   Physical Therapy Plan  General Plan: 2 times a day 7 days a week  Specific Instructions for Next Treatment: 1x/daily on weekends and holidays  Current

## 2024-01-04 NOTE — PROGRESS NOTES
GENERAL SURGERY PROGRESS NOTE- inpatient      PATIENT NAME: Grace Gonzaelz     DATE: 1/4/2024    SUBJECTIVE:    Patient is sitting up in chair today. Still having trouble breathing on non-rebreather mask and thick respiratory secretions. Complains of diffuse abdominal pain. Was refusing pain meds last night. Denies nausea, vomiting, flatus, or bowel movement. Has NG tube irritation.      OBJECTIVE:   VITALS:  BP (!) 113/46   Pulse 81   Temp 97 °F (36.1 °C) (Temporal)   Resp (!) 35   Ht 1.626 m (5' 4\")   Wt 44.5 kg (98 lb 3.2 oz)   SpO2 96%   BMI 16.86 kg/m²    height is 1.626 m (5' 4\") and weight is 44.5 kg (98 lb 3.2 oz). Her temporal temperature is 97 °F (36.1 °C). Her blood pressure is 113/46 (abnormal) and her pulse is 81. Her respiration is 35 (abnormal) and oxygen saturation is 96%.     INTAKE/OUTPUT:    I/O last 3 completed shifts:  In: 3631 [P.O.:60; I.V.:3571]  Out: 3825 [Urine:2900; Emesis/NG output:925]  I/O this shift:  In: -   Out: 725 [Urine:525; Emesis/NG output:200]              CONSTITUTIONAL:  fatigued and alert  HEART:regular rate  ABDOMEN:   absent bowel sounds, soft, distended, tenderness throughout. No rebound, no guarding. Incision c/d/I. No signs of infection. Staples intact. NG tube volume recorded at 625, thick bilious.   EXTREMITIES: no c/c/e      Data:  CBC:   Recent Labs     01/02/24  0500 01/03/24  0705 01/04/24  0535   WBC 9.8 10.1 12.4*   HGB 12.7 11.4* 10.8*   HCT 38.2 34.4* 31.9*    212 221     BMP:    Recent Labs     01/02/24  0500 01/02/24  2132 01/03/24  0705 01/04/24  0535     --  138 138   K 3.0* 3.6* 3.4* 3.4*   CL 99  --  104 105   CO2 26  --  24 25   BUN 12  --  13 10   CREATININE 0.5  --  0.4* 0.4*   GLUCOSE 167*  --  152* 155*     Hepatic:   Recent Labs     01/02/24  0500 01/03/24  0705 01/04/24  0535   AST 16 9 9   ALT 12 7 6   BILITOT 1.2 1.1 1.2   ALKPHOS 59 56 57     Xray 1/4/24: shows left abdomen with multiple loops of dilated small bowel.

## 2024-01-04 NOTE — RT PROTOCOL NOTE
RESPIRATORY ASSESSMENT PROTOCOL                                                                                              Patient Name: Grace Gonzalez Room#: I307/I307-01 : 1945     Admitting diagnosis: Small bowel obstruction (HCC) [K56.609]       Medical History:   Past Medical History:   Diagnosis Date    Arthritis     Asthma     Blood circulation, collateral     Cancer (HCC)     COPD (chronic obstructive pulmonary disease) (HCC)     HAP (hospital-acquired pneumonia) 2024    Pneumonia        PATIENT ASSESSMENT    LABORATORY DATA  Hematology:   Lab Results   Component Value Date/Time    WBC 12.4 2024 05:35 AM    RBC 3.51 2024 05:35 AM    HGB 10.8 2024 05:35 AM    HCT 31.9 2024 05:35 AM     2024 05:35 AM     Chemistry:  No results found for: \"PHART\", \"THF5XTP\", \"PO2ART\", \"L1ZHHOPJ\", \"ABR8PRG\", \"PBEA\", \"NBEA\"    VITALS  Pulse: 85   Respirations: (!) 37  BP: (!) 118/47  SpO2: (!) 89 % O2 Device: Other (Comment) (oxymask)  Temp: 98.6 °F (37 °C)    SKIN COLOR  [x] Normal  [] Pale  [] Dusky  [] Cyanotic    RESPIRATORY PATTERN  [] Normal  [x] Dyspnea  [] Cheyne-Esparza  [] Kussmaul  [] Biots    AMBULATORY  [] Yes  [] No  [x] With Assistance            Patient Acuity 0 1 2 3 4 Score   Level of Consciousness (LOC) [x]  Alert & Oriented or Pt normal LOC []  Confused;follows directions []  Confused & uncooper-ative []  Obtunded []  Comatose 0   Respiratory Rate  (RR) []  Reg. rate & pattern. 12 - 20 bpm  []  Increased RR. Greater than 20 bpm   [x]  SOB w/ exertion or RR greater than 24 bpm []  Access- ory muscle use at rest. Abn.  resp. []  SOB at rest.   2   Bilateral Breath Sounds (BBS) []  Clear []  Diminish-ed bases  []  Diminish-ed t/o, or rales   [x]  Sporadic, scattered wheezes or rhonchi []  Persistentwheezes and, or absent BBS 3   Cough []  Strong, effective, & non-prod. []  Effective & prod. Less than 25 ml (2 TBSP) over past 24 hrs []  Ineffective & non-prod

## 2024-01-04 NOTE — FLOWSHEET NOTE
Michelle NP in to talk to patient and her daughter concerning worsening of chest xray results and possibility of having to intubate patient. Explained that patient needs to get out of bed and move today. Patient willing to get up to chair at bedside.

## 2024-01-04 NOTE — PROGRESS NOTES
Occupational Therapy  Facility/Department: [facility] ICU  Reassessment / Daily Treatment Note  NAME: Grace Gonzalez  : 1945  MRN: [id number]    Date of Service: 2024    Discharge Recommendations:  Continue to assess pending progress, Subacute/Skilled Nursing Facility         Patient Diagnosis(es): The primary encounter diagnosis was Small bowel obstruction (HCC). A diagnosis of Other specified intestinal obstruction, unspecified whether partial or complete (HCC) was also pertinent to this visit.     Assessment    Assessment: OT reassessment completed d/t transfer from [facility] to ICU. Patient demosntrates increased weakness. Continue with POC.  Activity Tolerance: Patient limited by endurance;Patient limited by fatigue  Discharge Recommendations: Continue to assess pending progress;Subacute/Skilled Nursing Facility      Plan   Occupational Therapy Plan  Times Per Day: Once a day  Days Per Week: 7 Days  Current Treatment Recommendations: Strengthening;Balance training;Functional mobility training;Endurance training;Safety education & training;Patient/Caregiver education & training;Positioning;Self-Care / ADL;Home management training     Restrictions  Restrictions/Precautions  Restrictions/Precautions: NPO;General Precautions;Fall Risk;Up as Tolerated    Subjective    Patient with respiratory. Patient agreeable to OT re-assessment. Patient with c/o upper back pain d/t X-ray earlier this date.           Objective         ADL  Feeding: NPO  Feeding Skilled Clinical Factors: Pt had NG tube connected, NPO at this time.  Grooming: Minimal assistance  UE Bathing: Minimal assistance  LE Bathing: Moderate assistance  UE Dressing: Minimal assistance  LE Dressing: Maximum assistance  Toileting: Minimal assistance;Moderate assistance  Functional Mobility: Contact guard assistance  Functional Mobility Skilled Clinical Factors: FWW  Additional Comments: CGA ADL transfers            Patient Education  Education Given To:  Patient  Education Provided: Role of Therapy;Plan of Care;Transfer Training  Education Method: Verbal  Barriers to Learning: None  Education Outcome: Verbalized understanding;Demonstrated understanding    Goals  Short Term Goals  Time Frame for Short Term Goals: 21 visits  Short Term Goal 1: Pt will safely complete UB/LB dressing Mod(I) for ADLs at home.  Short Term Goal 2: Pt will safely complete toileting/hygiene Mod(I) for ADLs at home.  Short Term Goal 3: Pt will safely complete grooming/hygiene Mod(I) for ADLs at home.  Short Term Goal 4: Pt will safely complete functional mobility/transfers during ADLs at home.  Short Term Goal 5: Pt will complete BUE exercises to increase strength/endurance for functional mobility/transfers during ADL tasks.  Additional Goals?: No  Patient Goals   Patient goals : Family goal to move as much as ossible while here.    AM-PAC - ADL       Therapy Time   Individual Concurrent Group Co-treatment   Time In 1230         Time Out 1250         Minutes 20                 Mandie Silva OTR/L

## 2024-01-05 LAB
ALBUMIN SERPL-MCNC: 2.1 G/DL (ref 3.5–5.2)
ALBUMIN/GLOB SERPL: 0.8 {RATIO} (ref 1–2.5)
ALP SERPL-CCNC: 54 U/L (ref 35–104)
ALT SERPL-CCNC: 5 U/L (ref 5–33)
ANION GAP SERPL CALCULATED.3IONS-SCNC: 7 MMOL/L (ref 9–17)
AST SERPL-CCNC: 9 U/L
BASOPHILS # BLD: <0.03 K/UL (ref 0–0.2)
BASOPHILS NFR BLD: 0 % (ref 0–2)
BILIRUB SERPL-MCNC: 1 MG/DL (ref 0.3–1.2)
BUN SERPL-MCNC: 6 MG/DL (ref 8–23)
BUN/CREAT SERPL: 20 (ref 9–20)
CALCIUM SERPL-MCNC: 7.2 MG/DL (ref 8.6–10.4)
CHLORIDE SERPL-SCNC: 105 MMOL/L (ref 98–107)
CO2 SERPL-SCNC: 25 MMOL/L (ref 20–31)
CREAT SERPL-MCNC: 0.3 MG/DL (ref 0.5–0.9)
ECHO BSA: 1.37 M2
EOSINOPHIL # BLD: 0.05 K/UL (ref 0–0.44)
EOSINOPHILS RELATIVE PERCENT: 0 % (ref 1–4)
ERYTHROCYTE [DISTWIDTH] IN BLOOD BY AUTOMATED COUNT: 13.3 % (ref 11.8–14.4)
GFR SERPL CREATININE-BSD FRML MDRD: >60 ML/MIN/1.73M2
GLUCOSE BLD-MCNC: 123 MG/DL (ref 74–100)
GLUCOSE BLD-MCNC: 130 MG/DL (ref 74–100)
GLUCOSE BLD-MCNC: 137 MG/DL (ref 74–100)
GLUCOSE SERPL-MCNC: 298 MG/DL (ref 70–99)
HCT VFR BLD AUTO: 27.9 % (ref 36.3–47.1)
HGB BLD-MCNC: 9.2 G/DL (ref 11.9–15.1)
IMM GRANULOCYTES # BLD AUTO: 0.14 K/UL (ref 0–0.3)
IMM GRANULOCYTES NFR BLD: 1 %
LYMPHOCYTES NFR BLD: 1.61 K/UL (ref 1.1–3.7)
LYMPHOCYTES RELATIVE PERCENT: 14 % (ref 24–43)
MAGNESIUM SERPL-MCNC: 1.6 MG/DL (ref 1.6–2.6)
MCH RBC QN AUTO: 30.2 PG (ref 25.2–33.5)
MCHC RBC AUTO-ENTMCNC: 33 G/DL (ref 28.4–34.8)
MCV RBC AUTO: 91.5 FL (ref 82.6–102.9)
MICROORGANISM SPEC CULT: NORMAL
MICROORGANISM/AGENT SPEC: NORMAL
MICROORGANISM/AGENT SPEC: NORMAL
MONOCYTES NFR BLD: 1.21 K/UL (ref 0.1–1.2)
MONOCYTES NFR BLD: 11 % (ref 3–12)
NEUTROPHILS NFR BLD: 74 % (ref 36–65)
NEUTS SEG NFR BLD: 8.36 K/UL (ref 1.5–8.1)
NRBC BLD-RTO: 0 PER 100 WBC
PHOSPHATE SERPL-MCNC: 2.1 MG/DL (ref 2.6–4.5)
PLATELET # BLD AUTO: 184 K/UL (ref 138–453)
PMV BLD AUTO: 9.1 FL (ref 8.1–13.5)
POTASSIUM SERPL-SCNC: 3.9 MMOL/L (ref 3.7–5.3)
PROT SERPL-MCNC: 4.6 G/DL (ref 6.4–8.3)
RBC # BLD AUTO: 3.05 M/UL (ref 3.95–5.11)
SODIUM SERPL-SCNC: 137 MMOL/L (ref 135–144)
SPECIMEN DESCRIPTION: NORMAL
TRIGL SERPL-MCNC: 77 MG/DL
WBC OTHER # BLD: 11.4 K/UL (ref 3.5–11.3)

## 2024-01-05 PROCEDURE — 2580000003 HC RX 258: Performed by: NURSE PRACTITIONER

## 2024-01-05 PROCEDURE — 2000000000 HC ICU R&B

## 2024-01-05 PROCEDURE — 84478 ASSAY OF TRIGLYCERIDES: CPT

## 2024-01-05 PROCEDURE — 97110 THERAPEUTIC EXERCISES: CPT

## 2024-01-05 PROCEDURE — 2700000000 HC OXYGEN THERAPY PER DAY

## 2024-01-05 PROCEDURE — 84100 ASSAY OF PHOSPHORUS: CPT

## 2024-01-05 PROCEDURE — 85025 COMPLETE CBC W/AUTO DIFF WBC: CPT

## 2024-01-05 PROCEDURE — C9113 INJ PANTOPRAZOLE SODIUM, VIA: HCPCS | Performed by: NURSE PRACTITIONER

## 2024-01-05 PROCEDURE — 97116 GAIT TRAINING THERAPY: CPT

## 2024-01-05 PROCEDURE — 83735 ASSAY OF MAGNESIUM: CPT

## 2024-01-05 PROCEDURE — 6360000002 HC RX W HCPCS: Performed by: NURSE PRACTITIONER

## 2024-01-05 PROCEDURE — 2500000003 HC RX 250 WO HCPCS: Performed by: NURSE PRACTITIONER

## 2024-01-05 PROCEDURE — 94640 AIRWAY INHALATION TREATMENT: CPT

## 2024-01-05 PROCEDURE — 93970 EXTREMITY STUDY: CPT | Performed by: SURGERY

## 2024-01-05 PROCEDURE — 36415 COLL VENOUS BLD VENIPUNCTURE: CPT

## 2024-01-05 PROCEDURE — 82947 ASSAY GLUCOSE BLOOD QUANT: CPT

## 2024-01-05 PROCEDURE — 94761 N-INVAS EAR/PLS OXIMETRY MLT: CPT

## 2024-01-05 PROCEDURE — 94669 MECHANICAL CHEST WALL OSCILL: CPT

## 2024-01-05 PROCEDURE — A4216 STERILE WATER/SALINE, 10 ML: HCPCS | Performed by: NURSE PRACTITIONER

## 2024-01-05 PROCEDURE — 80053 COMPREHEN METABOLIC PANEL: CPT

## 2024-01-05 RX ORDER — 0.9 % SODIUM CHLORIDE 0.9 %
250 INTRAVENOUS SOLUTION INTRAVENOUS ONCE
Status: DISCONTINUED | OUTPATIENT
Start: 2024-01-05 | End: 2024-01-05

## 2024-01-05 RX ORDER — MAGNESIUM SULFATE IN DEXTROSE 5% IV SOLN 2 GM/100ML 2-5/100 GM/ML-%
2000 SOLUTION INTRAVENOUS ONCE
Status: DISCONTINUED | OUTPATIENT
Start: 2024-01-05 | End: 2024-01-05

## 2024-01-05 RX ORDER — MAGNESIUM SULFATE IN WATER 40 MG/ML
2000 INJECTION, SOLUTION INTRAVENOUS ONCE
Status: DISCONTINUED | OUTPATIENT
Start: 2024-01-05 | End: 2024-01-09 | Stop reason: HOSPADM

## 2024-01-05 RX ADMIN — ALBUTEROL SULFATE 2.5 MG: 2.5 SOLUTION RESPIRATORY (INHALATION) at 20:37

## 2024-01-05 RX ADMIN — SODIUM CHLORIDE, PRESERVATIVE FREE 10 ML: 5 INJECTION INTRAVENOUS at 08:19

## 2024-01-05 RX ADMIN — MORPHINE SULFATE 0.5 MG: 2 INJECTION, SOLUTION INTRAMUSCULAR; INTRAVENOUS at 04:06

## 2024-01-05 RX ADMIN — MORPHINE SULFATE 0.5 MG: 2 INJECTION, SOLUTION INTRAMUSCULAR; INTRAVENOUS at 21:26

## 2024-01-05 RX ADMIN — PIPERACILLIN AND TAZOBACTAM 3375 MG: 3; .375 INJECTION, POWDER, LYOPHILIZED, FOR SOLUTION INTRAVENOUS at 15:35

## 2024-01-05 RX ADMIN — PIPERACILLIN AND TAZOBACTAM 3375 MG: 3; .375 INJECTION, POWDER, LYOPHILIZED, FOR SOLUTION INTRAVENOUS at 23:51

## 2024-01-05 RX ADMIN — ALBUTEROL SULFATE 2.5 MG: 2.5 SOLUTION RESPIRATORY (INHALATION) at 11:28

## 2024-01-05 RX ADMIN — ASCORBIC ACID, VITAMIN A PALMITATE, CHOLECALCIFEROL, THIAMINE HYDROCHLORIDE, RIBOFLAVIN-5 PHOSPHATE SODIUM, PYRIDOXINE HYDROCHLORIDE, NIACINAMIDE, DEXPANTHENOL, ALPHA-TOCOPHEROL ACETATE, VITAMIN K1, FOLIC ACID, BIOTIN, CYANOCOBALAMIN: 200; 3300; 200; 6; 3.6; 6; 40; 15; 10; 150; 600; 60; 5 INJECTION, SOLUTION INTRAVENOUS at 10:22

## 2024-01-05 RX ADMIN — ALBUTEROL SULFATE 2.5 MG: 2.5 SOLUTION RESPIRATORY (INHALATION) at 16:29

## 2024-01-05 RX ADMIN — MAGNESIUM SULFATE HEPTAHYDRATE 2000 MG: 40 INJECTION, SOLUTION INTRAVENOUS at 11:54

## 2024-01-05 RX ADMIN — PANTOPRAZOLE SODIUM 40 MG: 40 INJECTION, POWDER, FOR SOLUTION INTRAVENOUS at 08:10

## 2024-01-05 RX ADMIN — MORPHINE SULFATE 0.5 MG: 2 INJECTION, SOLUTION INTRAMUSCULAR; INTRAVENOUS at 17:03

## 2024-01-05 RX ADMIN — ALBUTEROL SULFATE 2.5 MG: 2.5 SOLUTION RESPIRATORY (INHALATION) at 03:59

## 2024-01-05 RX ADMIN — MORPHINE SULFATE 0.5 MG: 2 INJECTION, SOLUTION INTRAMUSCULAR; INTRAVENOUS at 06:55

## 2024-01-05 RX ADMIN — SODIUM CHLORIDE, PRESERVATIVE FREE 10 ML: 5 INJECTION INTRAVENOUS at 20:42

## 2024-01-05 RX ADMIN — Medication 250 ML: at 00:32

## 2024-01-05 RX ADMIN — ONDANSETRON 4 MG: 2 INJECTION INTRAMUSCULAR; INTRAVENOUS at 15:38

## 2024-01-05 RX ADMIN — ONDANSETRON 4 MG: 2 INJECTION INTRAMUSCULAR; INTRAVENOUS at 04:20

## 2024-01-05 RX ADMIN — ENOXAPARIN SODIUM 30 MG: 100 INJECTION SUBCUTANEOUS at 08:10

## 2024-01-05 RX ADMIN — OLIVE OIL AND SOYBEAN OIL 5.5 ML/HR: 16; 4 INJECTION, EMULSION INTRAVENOUS at 17:01

## 2024-01-05 RX ADMIN — POTASSIUM CHLORIDE, DEXTROSE MONOHYDRATE AND SODIUM CHLORIDE: 150; 5; 450 INJECTION, SOLUTION INTRAVENOUS at 07:00

## 2024-01-05 RX ADMIN — ONDANSETRON 4 MG: 2 INJECTION INTRAMUSCULAR; INTRAVENOUS at 21:26

## 2024-01-05 RX ADMIN — SODIUM CHLORIDE, PRESERVATIVE FREE 10 ML: 5 INJECTION INTRAVENOUS at 08:20

## 2024-01-05 RX ADMIN — PIPERACILLIN AND TAZOBACTAM 3375 MG: 3; .375 INJECTION, POWDER, LYOPHILIZED, FOR SOLUTION INTRAVENOUS at 06:55

## 2024-01-05 ASSESSMENT — PAIN DESCRIPTION - PAIN TYPE
TYPE: CHRONIC PAIN;SURGICAL PAIN

## 2024-01-05 ASSESSMENT — PAIN DESCRIPTION - ONSET
ONSET: ON-GOING

## 2024-01-05 ASSESSMENT — PAIN SCALES - GENERAL
PAINLEVEL_OUTOF10: 1
PAINLEVEL_OUTOF10: 2
PAINLEVEL_OUTOF10: 0
PAINLEVEL_OUTOF10: 7
PAINLEVEL_OUTOF10: 1
PAINLEVEL_OUTOF10: 6
PAINLEVEL_OUTOF10: 0
PAINLEVEL_OUTOF10: 2
PAINLEVEL_OUTOF10: 4
PAINLEVEL_OUTOF10: 0
PAINLEVEL_OUTOF10: 3
PAINLEVEL_OUTOF10: 6

## 2024-01-05 ASSESSMENT — PAIN DESCRIPTION - ORIENTATION
ORIENTATION: MID

## 2024-01-05 ASSESSMENT — PAIN - FUNCTIONAL ASSESSMENT
PAIN_FUNCTIONAL_ASSESSMENT: ACTIVITIES ARE NOT PREVENTED

## 2024-01-05 ASSESSMENT — PAIN DESCRIPTION - FREQUENCY
FREQUENCY: INTERMITTENT
FREQUENCY: CONTINUOUS
FREQUENCY: CONTINUOUS
FREQUENCY: INTERMITTENT

## 2024-01-05 ASSESSMENT — PAIN DESCRIPTION - DESCRIPTORS
DESCRIPTORS: ACHING;DISCOMFORT

## 2024-01-05 ASSESSMENT — PAIN DESCRIPTION - LOCATION
LOCATION: ABDOMEN;BACK

## 2024-01-05 NOTE — PROGRESS NOTES
INTENSIVE CARE UNIT   APRN - Progress Note    Patient - Grace Gonzalez  Date of Admission -  12/30/2023  7:55 PM  Date of Evaluation -  1/5/2024  Hospital Day - 6      SUBJECTIVE:     The Grace Gonzalez is a 78 y.o. female who is seen for follow up in the ICU.  Per nursing report and notes, overnight events include:  None .  She resting in bed with daughter at bedside.  Patient's oxygen needs are decreasing however she does continue to remain frail weak and tachypneic.  Much encouragement given to the patient as well as the daughter regarding the need to increase activity along with coughing and deep breathing to improve pulmonary function.      ROS:   Constitutional: negative  for fevers, and negative for chills.  Respiratory: positive for shortness of breath, positive for cough, and negative for wheezing  Cardiovascular: negative for chest pain, and negative for palpitations  Gastrointestinal: positive for abdominal pain, negative for nausea,negative for vomiting, negative for diarrhea, and negative for constipation    All other systems were reviewed with the patient and are negative unless otherwise stated in HPI.      OBJECTIVE:     VITAL SIGNS:  Patient Vitals for the past 8 hrs:   BP Temp Temp src Pulse Resp SpO2 Weight   01/05/24 0715 (!) 118/55 -- -- 69 (!) 34 (!) 89 % --   01/05/24 0700 (!) 150/69 -- -- 62 (!) 34 (!) 89 % --   01/05/24 0655 (!) 150/69 -- -- 70 (!) 32 90 % --   01/05/24 0645 (!) 126/54 98.4 °F (36.9 °C) Temporal 72 29 (!) 89 % --   01/05/24 0630 (!) 109/54 -- -- 73 (!) 32 (!) 89 % --   01/05/24 0615 (!) 109/55 -- -- 73 (!) 35 (!) 89 % --   01/05/24 0600 (!) 114/51 -- -- 72 (!) 35 (!) 88 % --   01/05/24 0545 (!) 116/53 -- -- 71 (!) 33 (!) 88 % --   01/05/24 0530 (!) 95/50 -- -- 72 30 (!) 87 % --   01/05/24 0515 (!) 96/45 -- -- 76 (!) 32 (!) 88 % --   01/05/24 0500 (!) 95/42 -- -- 76 (!) 31 (!) 89 % --   01/05/24 0445 (!) 98/41 -- -- 81 29 90 % --   01/05/24 0430 (!) 110/57 -- -- 73 15

## 2024-01-05 NOTE — PROGRESS NOTES
Occupational Therapy  Facility/Department: [facility] ICU  Daily Treatment Note  NAME: Grace Gonzalez  : 1945  MRN: [id number]    Date of Service: 2024    Discharge Recommendations:  Continue to assess pending progress, Subacute/Skilled Nursing Facility         Patient Diagnosis(es): The primary encounter diagnosis was Small bowel obstruction (HCC). Diagnoses of Other specified intestinal obstruction, unspecified whether partial or complete (HCC) and Acute respiratory failure with hypoxia (HCC) were also pertinent to this visit.     Assessment    Activity Tolerance: Patient limited by fatigue;Patient limited by pain  Discharge Recommendations: Continue to assess pending progress;Subacute/Skilled Nursing Facility      Plan   Occupational Therapy Plan  Times Per Day: Once a day  Days Per Week: 7 Days  Current Treatment Recommendations: Strengthening;Balance training;Functional mobility training;Endurance training;Safety education & training;Patient/Caregiver education & training;Positioning;Self-Care / ADL;Home management training     Restrictions   General fall risk    Subjective   Subjective  Subjective: Pt in recliner, agreed to BUE therex. Pt limited by endurance and strength.  Pain: 3/10 abdominal pain  Pain: increased pain with movement but does not rate           Objective    Vitals  Pt declined self care at this time citing pain.         OT Exercises  Exercise Treatment: Pt completed B UE ther ex to increase strength.endurnace for ease of fxl tasks. Pt completed red digiflex x 20 yellow flex bar bends x 20 and 1# free weight x 20 reps chest press, chest pulls, wrist flex/ext, stir the pot, pt declined overhead therex d/t pain and d/c'd therex after ~ 10 min of act d/t low endurance.     Safety Devices  Type of Devices: All fall risk precautions in place;Call light within reach;Chair alarm in place;Left in chair;Nurse notified     Patient Education  Education Given To: Patient  Education Provided: Role of

## 2024-01-05 NOTE — PROGRESS NOTES
Writer present, vitals and assessment as charted. 1830 vitals not documented due to patient being bathed and ambulated from chair to bed. Patient pain increased from a 5/10 to 6/10 after bath, cough deep breathing and ambulation. Patient bathed with soap and water. CHG also used to bay and right arm.  Patient was able to stand and transfer from chair to bed with walker and minimal assistance from staff. While standing, patient encouraged to deep breath and cough. After returning to bed patient positioned to right side. Abdominal binder repositioned, incision to mid line abdomen well approximated no redness, drainage or swelling noted. NG present to right nares to LIS. Patient medicated for pain and nausea.  No further needs expressed. Daughter present in room, assists patient with younker when having productive cough. Call light and bedside table within reach. Care ongoing.

## 2024-01-05 NOTE — PROGRESS NOTES
Patient continues to be up in bedside chair, resting with eyes closed, daughter at side, hypoactive bowel sounds continue, patient denies any passing of gas, patient able to stand and move slightly, cough and deep breathe, all needs met, call light within reach.

## 2024-01-05 NOTE — ACP (ADVANCE CARE PLANNING)
Advance Care Planning     Advance Care Planning Activator (Inpatient)  Conversation Note      Date of ACP Conversation: 1/5/2024     Conversation Conducted with: Patient with Decision Making Capacity   Healthcare Decision Maker: Named in Advance Directive or Healthcare Power of  (name) Daughter Johana    ACP Activator: Kayli Allen RN    Health Care Decision Maker:     Current Designated Health Care Decision Maker:     Primary Decision Maker: Johana Paulson - Child - [phone]    Today we documented Decision Maker(s). The patient will provide ACP documents.    Care Preferences    Ventilation:  \"If you were in your present state of health and suddenly became very ill and were unable to breathe on your own, what would your preference be about the use of a ventilator (breathing machine) if it were available to you?\"      Would the patient desire the use of ventilator (breathing machine)?: no    \"If your health worsens and it becomes clear that your chance of recovery is unlikely, what would your preference be about the use of a ventilator (breathing machine) if it were available to you?\"     Would the patient desire the use of ventilator (breathing machine)?: No      Resuscitation  \"CPR works best to restart the heart when there is a sudden event, like a heart attack, in someone who is otherwise healthy. Unfortunately, CPR does not typically restart the heart for people who have serious health conditions or who are very sick.\"    \"In the event your heart stopped as a result of an underlying serious health condition, would you want attempts to be made to restart your heart (answer \"yes\" for attempt to resuscitate) or would you prefer a natural death (answer \"no\" for do not attempt to resuscitate)?\" no       [x] Yes   [] No   Educated Patient / Decision Maker regarding differences between Advance Directives and portable DNR orders.    Length of ACP Conversation in minutes:  15    Conversation

## 2024-01-05 NOTE — CONSULTS
[facility]         Department of Pharmacy    Pharmacy Consult: Parenteral Nutrition   Patient:  Grace Gonzalez  MRN: [id number]             Recent Labs     01/05/24  0518        Recent Labs     01/05/24  0518        Recent Labs     01/05/24  0518   K 3.9     Recent Labs     01/04/24  0535   MG 1.7     No results for input(s): \"PHOS\" in the last 72 hours.  Recent Labs     01/05/24  0518   GLUCOSE 298*     Recent Labs     01/05/24  0518   BUN 6*       Macronutrients   DW: 45.4kg   AA: 1.26 g/kg   Total Kcal: 13 Kcal/kg   Lipids: 4.4 % of Total Kcal      PN Product Dispensed: CLINIMIX-E 4.25/5    Lipid Product Dispensed: Intralipid 20%    Rates of Infusion   Lipid Infusion Rate: 5.5 mL/hr for 12 hours   Clinimix-E Infusion Rate: 55 mL/hr    Electrolytes (per bag) Clinimix E   Na= 70 mEq/Bag (2000ml)    K= 60 mEq/Bag (2000ml)              Mg= 10 mEq/Bag (2000ml)              Ca= 4.4 mmol/Bag (2000ml)              Acetate= 160 mEq/Bag (2000ml)  (140mEq/Bag-Peripheral Formula)              Chloride= 78 mEq/Bag (2000ml)              Phosphate= 30mMol/Bag (2000ml)                            MVI= 10 mL/Bag 2000ml   Trace Elements= 1 mL/Bag (2000ml)  **NOTE: Electrolytes are based upon the total volume of the TPN bag (2000ml).  The amount of electrolytes received is based upon the rate/volume of the total infusion.    Assessment/ Plan:  Pharmacy will review daily electrolytes and adjust as needed.Will initiate PN at 10:00 1/5/24 and run initial infusion over 32 hours (with next infusion dosing to begin at traditional 18:00 on 1/6/24).  Will continue to consult dietician for caloric needs and rate changes.    Thank you for the consult,  Dana Cruz, RPH1/5/20249:06 AM

## 2024-01-05 NOTE — PROGRESS NOTES
Pt resting in bed, assessment and vitals complete, see flow sheet for documentation, patient stated she is having 6/10 pain in abdomen and back, prn morphine given at this time, repositioned for comfort, NG patent, no bowel sounds noted, patient denies passing gas at this time, all other needs met, call light within reach.

## 2024-01-05 NOTE — CONSULTS
Palliative Care Inpatient Consult    NAME:  Grace Gonzalez  MEDICAL RECORD NUMBER:  [id number]  AGE: 78 y.o.   GENDER: female  : 1945  TODAY'S DATE:  2024    Reason for Consult:  ACP, patient and family support    History of Present Illness     The patient is a 78 y.o.  Non- / non  female who presents with Abdominal Pain (Lower abd pain with emesis and constipation, states last BM . Not tolerating PO intake, denies history of bowel obstructions)      Referred to Palliative Care by  [] Physician   [x] Nursing  [] Family Request   [] Other:      She was admitted to the hospitalist service for Small bowel obstruction (HCC) [K56.609]. The patient has a complicated medical history and has been hospitalized since 2023  7:55 PM.    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] 2024    Small bowel obstruction (HCC) likely adhesion related from prior  and hysterectomy [K56.609] 2023       Data         BP (!) 100/48   Pulse 71   Temp 98.2 °F (36.8 °C) (Temporal)   Resp 28   Ht 1.626 m (5' 4\")   Wt 45.4 kg (100 lb 2 oz)   SpO2 91%   BMI 17.19 kg/m²     Wt Readings from Last 3 Encounters:   24 45.4 kg (100 lb 2 oz)        Code Status: DNR-CCA     ADVANCED CARE PLANNING:  Patient has capacity for medical decisions: yes  Health Care Power of : yes  Living Will: yes     Personal, Social, and Family History  Marital Status: single  Living situation:alone  Psychological Distress: moderate  Does patient understand diagnosis/treatment? yes  Does caregiver understand diagnosis/treatment? yes    Assessment        Symptom management/ pain control   Pain Assessment:  Pain is controlled with current analgesics.  Medication(s) being used:

## 2024-01-05 NOTE — PROGRESS NOTES
Comprehensive Nutrition Assessment    Type and Reason for Visit:  Reassess    Nutrition Recommendations/Plan:   Parenteral support r/t malnutrition  Monitor glucose (recommend fingerstick to confirm glucose)     Malnutrition Assessment:  Malnutrition Status:  Severe malnutrition (01/02/24 0831)    Context:  Acute Illness     Findings of the 6 clinical characteristics of malnutrition:  Energy Intake:  50% or less of estimated energy requirements for 5 or more days  Weight Loss:  No significant weight loss     Body Fat Loss:  Moderate body fat loss Fat Overlying Ribs, Buccal region, Orbital   Muscle Mass Loss:  Moderate muscle mass loss Hand (interosseous), Clavicles (pectoralis & deltoids), Temples (temporalis)  Fluid Accumulation:  Moderate to Severe Extremities   Strength:  Not Performed    Nutrition Assessment:    Continued malnutrition with NPO status, lack of b/s or flatus with NGT suction (~200 ml out since last cannister hung and 100 ml out since 3 pm yesterday). Continues with D5 1/2 NS at 100 ml/hr for 408 kcal as dextrose. Decreasing bun/creat likely with lack of exogenous protein, potential catabolism and influence of IVF.  Elevated glucose this AM via lab draw, would recommend obtain fingerstick to confirm.  Recommendation for PN (peripheral formulation) stand: PN (peripheral) recommendations: 55 ml/hr Clinimix 4.25/5% with 5.5 ml/hr IL 20% x 12 hours to provide 580 calories (13 kavon/kg), 56 g AA (1.26 g/kg AA).    Nutrition Related Findings:    200 ml out NGT. no b/s. no flatus. Wound Type: Surgical Incision       Current Nutrition Intake & Therapies:    Average Meal Intake: NPO  Average Supplements Intake: NPO  Current Parenteral Nutrition Orders:  Type and Formula: Premix Peripheral   Lipids: Daily  Duration: Continuous  Rate/Volume: 55 ml/hr Clinimix 4.25/5% with 5.5 ml/hr IL 20% x 12 hours  Current PN Order Provides: 580 calories (13 kavon/kg), 56 g AA (1.26 g/kg AA)  Goal PN Orders Provides:  TBD    Anthropometric Measures:  Height: 162.6 cm (5' 4\")  Ideal Body Weight (IBW): 120 lbs (55 kg)    Admission Body Weight: 42.6 kg (94 lb)  Current Body Weight: 45.4 kg (100 lb 1.4 oz), 81.3 % IBW. Weight Source: Bed Scale  Current BMI (kg/m2): 17.2  Usual Body Weight: 49.9 kg (110 lb)  % Weight Change (Calculated): -9  Weight Adjustment For: No Adjustment                 BMI Categories: Underweight (BMI less than 18.5)    Estimated Daily Nutrient Needs:  Energy Requirements Based On: Kcal/kg  Weight Used for Energy Requirements: Current  Energy (kcal/day): 1383-6039 (35-40)  Weight Used for Protein Requirements: Current  Protein (g/day): 66-80 (1.5-1.8)  Method Used for Fluid Requirements: 1 ml/kcal  Fluid (ml/day): 1800    Nutrition Diagnosis:   Severe malnutrition related to inadequate protein-energy intake as evidenced by moderate loss of subcutaneous fat, moderate muscle loss, weight loss, BMI, poor intake prior to admission    Recent Labs     01/03/24  0705 01/04/24  0535 01/05/24  0518    138 137   K 3.4* 3.4* 3.9    105 105   CO2 24 25 25   BUN 13 10 6*   CREATININE 0.4* 0.4* 0.3*   GLUCOSE 152* 155* 298*   AST 9 9 9   ALT 7 6 5   BILITOT 1.1 1.2 1.0   ALKPHOS 56 57 54      Lab Results   Component Value Date/Time    LABALBU 2.1 01/05/2024 05:18 AM    No results found for: \"PHOS\"   Lab Results   Component Value Date/Time    MG 1.7 01/04/2024 05:35 AM    No results found for: \"PREALBUMIN\"    Nutrition Interventions:   Food and/or Nutrient Delivery: Continue NPO, Start Parenteral Nutrition  Nutrition Education/Counseling: No recommendation at this time  Coordination of Nutrition Care: Continue to monitor while inpatient  Plan of Care discussed with: nursing    Goals:  Previous Goal Met: No Progress toward Goal(s)  Goals: Meet at least 75% of estimated needs       Nutrition Monitoring and Evaluation:   Behavioral-Environmental Outcomes: Other (Comment) (clinical status.)  Food/Nutrient Intake

## 2024-01-05 NOTE — PROGRESS NOTES
Physical Therapy  Facility/Department: [facility] ICU  Daily Treatment Note  NAME: Grace Gonzalez  : 1945  MRN: [id number]    Date of Service: 2024    Discharge Recommendations:  Continue to assess pending progress, Subacute/Skilled Nursing Facility        Patient Diagnosis(es): The primary encounter diagnosis was Small bowel obstruction (HCC). Diagnoses of Other specified intestinal obstruction, unspecified whether partial or complete (HCC) and Acute respiratory failure with hypoxia (HCC) were also pertinent to this visit.    Assessment   Assessment: Patient in bed upon arrival with daughter present. Bed Mobility: CGA/Mark Transfers: CGA/SBA Treatment limited d/t increase in pain when moving and fatigue.  Activity Tolerance: Patient limited by fatigue;Patient limited by pain     Plan    Physical Therapy Plan  General Plan: 2 times a day 7 days a week (1x/day on weekends)     Restrictions  Restrictions/Precautions  Restrictions/Precautions: NPO, General Precautions, Fall Risk, Up as Tolerated  Required Braces or Orthoses?: No  Lower Extremity Weight Bearing Restrictions  Right Lower Extremity Weight Bearing: Weight Bearing As Tolerated     Subjective    Subjective  Subjective: Pt in bed upon arrival with daughter present.  Pain: increased pain with movement but does not rate     Objective   Vitals     Bed Mobility Training  Bed Mobility Training: Yes  Overall Level of Assistance: Contact-guard assistance;Minimum assistance;Assist X1  Interventions: Safety awareness training;Verbal cues  Rolling: Minimum assistance;Assist X1  Supine to Sit: Minimum assistance;Assist X1  Sit to Supine: Minimum assistance;Assist X1  Scooting: Minimum assistance;Assist X1  Balance  Sitting: Intact  Standing: Impaired  Standing - Static: Fair  Standing - Dynamic: Fair  Transfer Training  Transfer Training: Yes  Overall Level of Assistance: Contact-guard assistance;Assist X1;Stand-by assistance  Interventions: Safety awareness

## 2024-01-05 NOTE — PROGRESS NOTES
Physical Therapy  Facility/Department: [facility] ICU  Daily Treatment Note  NAME: Grace Gonzalez  : 1945  MRN: [id number]    Date of Service: 2024    Discharge Recommendations:  Continue to assess pending progress, Subacute/Skilled Nursing Facility        Patient Diagnosis(es): The primary encounter diagnosis was Small bowel obstruction (HCC). Diagnoses of Other specified intestinal obstruction, unspecified whether partial or complete (HCC) and Acute respiratory failure with hypoxia (HCC) were also pertinent to this visit.    Assessment   Assessment: Patient completes reclined therex BLE x15 in all available planes, rest breaks d/t stomach pains and fatigue. Patient completes 1 minute of static standing balance at FWW with SBA, forward flexed posture. Transfers: CGA/SBA  Activity Tolerance: Patient limited by fatigue;Patient limited by pain     Plan    Physical Therapy Plan  General Plan: 2 times a day 7 days a week (1x/day on weekends)     Restrictions  Restrictions/Precautions  Restrictions/Precautions: NPO, General Precautions, Fall Risk, Up as Tolerated  Required Braces or Orthoses?: No  Lower Extremity Weight Bearing Restrictions  Right Lower Extremity Weight Bearing: Weight Bearing As Tolerated     Subjective    Subjective  Subjective: Pt in chair upon arrival with daughter present.  Pain: increased pain with movement but does not rate     Objective   Vitals     Bed Mobility Training  Bed Mobility Training: No  Overall Level of Assistance: Contact-guard assistance;Minimum assistance;Assist X1  Interventions: Safety awareness training;Verbal cues  Rolling: Minimum assistance;Assist X1  Supine to Sit: Minimum assistance;Assist X1  Sit to Supine: Minimum assistance;Assist X1  Scooting: Minimum assistance;Assist X1  Balance  Sitting: Intact  Standing: Impaired  Standing - Static: Fair  Standing - Dynamic: Fair  Transfer Training  Transfer Training: Yes  Overall Level of Assistance: Contact-guard

## 2024-01-05 NOTE — PLAN OF CARE
Problem: Discharge Planning  Goal: Discharge to home or other facility with appropriate resources  Outcome: Progressing  Flowsheets  Taken 1/4/2024 2345  Discharge to home or other facility with appropriate resources: Identify barriers to discharge with patient and caregiver  Taken 1/4/2024 1945  Discharge to home or other facility with appropriate resources: Identify barriers to discharge with patient and caregiver     Problem: Pain  Goal: Verbalizes/displays adequate comfort level or baseline comfort level  Outcome: Progressing  Note: Notify staff of returning or increasing pain. Utilize pain medication as appropriate. Use non-pharmaceutical means for pain management ie: warm blanket, ice, repositioning.       Problem: Safety - Adult  Goal: Free from fall injury  Outcome: Progressing  Note: Call light and bedside table with in reach. Bed and chair wheels locked at all times, with bed in lowest position. Frequent checks and needs meet in appropriate timing.      Problem: Skin/Tissue Integrity  Goal: Absence of new skin breakdown  Description: 1.  Monitor for areas of redness and/or skin breakdown  2.  Assess vascular access sites hourly  3.  Every 4-6 hours minimum:  Change oxygen saturation probe site  4.  Every 4-6 hours:  If on nasal continuous positive airway pressure, respiratory therapy assess nares and determine need for appliance change or resting period.  Outcome: Progressing  Note: George scale monitoring. inspect skin for breakdown. Chart all shin breakdown. Encourage frequent repositioning. No new breakdown.     Problem: Respiratory - Adult  Goal: Achieves optimal ventilation and oxygenation  Outcome: Progressing     Problem: Cardiovascular - Adult  Goal: Maintains optimal cardiac output and hemodynamic stability  Outcome: Progressing  Flowsheets (Taken 1/4/2024 1945)  Maintains optimal cardiac output and hemodynamic stability: Monitor blood pressure and heart rate     Problem: Skin/Tissue Integrity -

## 2024-01-05 NOTE — PROGRESS NOTES
GENERAL SURGERY PROGRESS NOTE- inpatient      PATIENT NAME: Grace Gonzalez     DATE: 1/5/2024    SUBJECTIVE:    Patient up in chair today. Still has respiratory concerns. No flatus, no BM. NG tube output 825ml     OBJECTIVE:   VITALS:  BP (!) 120/55   Pulse 75   Temp 98.4 °F (36.9 °C) (Temporal)   Resp 19   Ht 1.626 m (5' 4\")   Wt 45.4 kg (100 lb 2 oz)   SpO2 90%   BMI 17.19 kg/m²    height is 1.626 m (5' 4\") and weight is 45.4 kg (100 lb 2 oz). Her temporal temperature is 98.4 °F (36.9 °C). Her blood pressure is 120/55 (abnormal) and her pulse is 75. Her respiration is 19 and oxygen saturation is 90%.     INTAKE/OUTPUT:    I/O last 3 completed shifts:  In: 4774.8 [P.O.:10; I.V.:4764.8]  Out: 2525 [Urine:1450; Emesis/NG output:1075]  No intake/output data recorded.              CONSTITUTIONAL:  awake and alert  HEART:regular rate  ABDOMEN:   absent bowel sounds, soft, distended, mild diffuse tenderness  INCISION: clean, dry, no drainage  EXTREMITIES: no c/c/e    NG tube output thinning out but still bilious    Data:  CBC:   Recent Labs     01/03/24  0705 01/04/24  0535 01/05/24  0518   WBC 10.1 12.4* 11.4*   HGB 11.4* 10.8* 9.2*   HCT 34.4* 31.9* 27.9*    221 184     BMP:    Recent Labs     01/03/24  0705 01/04/24  0535 01/05/24  0518    138 137   K 3.4* 3.4* 3.9    105 105   CO2 24 25 25   BUN 13 10 6*   CREATININE 0.4* 0.4* 0.3*   GLUCOSE 152* 155* 298*     Hepatic:   Recent Labs     01/03/24  0705 01/04/24  0535 01/05/24  0518   AST 9 9 9   ALT 7 6 5   BILITOT 1.1 1.2 1.0   ALKPHOS 56 57 54     Narrative & Impression  EXAMINATION:  TWO XRAY VIEWS OF THE ABDOMEN     1/4/2024 12:30 pm     COMPARISON:  12/31/2023     HISTORY:  ORDERING SYSTEM PROVIDED HISTORY: persistent small bowel obstruction,  evaluation post-op  TECHNOLOGIST PROVIDED HISTORY:  persistent small bowel obstruction, evaluation post-op     FINDINGS:  Tip of NG tube projects in region of Pebbles pyloric region     Gaseous

## 2024-01-05 NOTE — PLAN OF CARE
Problem: Discharge Planning  Goal: Discharge to home or other facility with appropriate resources  1/5/2024 0850 by Lizz Burrows RN  Outcome: Progressing  1/5/2024 0349 by Ade Murray RN  Outcome: Progressing  Flowsheets  Taken 1/4/2024 2345  Discharge to home or other facility with appropriate resources: Identify barriers to discharge with patient and caregiver  Taken 1/4/2024 1945  Discharge to home or other facility with appropriate resources: Identify barriers to discharge with patient and caregiver     Problem: Pain  Goal: Verbalizes/displays adequate comfort level or baseline comfort level  1/5/2024 0850 by Lizz Burrows RN  Outcome: Progressing  1/5/2024 0349 by Ade Murray RN  Outcome: Progressing  Note: Notify staff of returning or increasing pain. Utilize pain medication as appropriate. Use non-pharmaceutical means for pain management ie: warm blanket, ice, repositioning.       Problem: Safety - Adult  Goal: Free from fall injury  1/5/2024 0850 by Lizz Burrows RN  Outcome: Progressing  Flowsheets (Taken 1/5/2024 0725)  Free From Fall Injury: Instruct family/caregiver on patient safety  1/5/2024 0349 by Ade Murray RN  Outcome: Progressing  Note: Call light and bedside table with in reach. Bed and chair wheels locked at all times, with bed in lowest position. Frequent checks and needs meet in appropriate timing.      Problem: Skin/Tissue Integrity  Goal: Absence of new skin breakdown  Description: 1.  Monitor for areas of redness and/or skin breakdown  2.  Assess vascular access sites hourly  3.  Every 4-6 hours minimum:  Change oxygen saturation probe site  4.  Every 4-6 hours:  If on nasal continuous positive airway pressure, respiratory therapy assess nares and determine need for appliance change or resting period.  1/5/2024 0850 by Lizz Burrows RN  Outcome: Progressing  1/5/2024 0349 by Ade Murray RN  Outcome: Progressing  Note: George scale monitoring.

## 2024-01-05 NOTE — PROGRESS NOTES
Spiritual Services Interventions  I307/I307-01   1/5/2024  Wendie Contreras    Grace Gonzalez  78 y.o. year old female    Encounter Summary  Encounter Overview/Reason : (P) Spiritual/Emotional Needs  Service Provided For:: (P) Family  Referral/Consult From:: (P) Palliative Care  Support System: (P) Children, Family members  Last Encounter : (P) 01/05/24  Complexity of Encounter: (P) Moderate  Begin Time: (P) 1615  End Time : (P) 1715  Total Time Calculated: (P) 60 min     Spiritual/Emotional needs  Type: Spiritual Support     Grief, Loss, and Adjustments  Type: (P) Adjustment to illness              Assessment/Intervention/Outcome  Assessment: (P) Coping  Intervention: (P) Active listening, Explored/Affirmed feelings, thoughts, concerns, Explored Coping Skills/Resources, Nurtured Hope  Outcome: (P) Engaged in conversation, Venting emotion, Expressed feelings, needs, and concerns, Less anxious, Less agitated, Expressed Gratitude

## 2024-01-06 ENCOUNTER — APPOINTMENT (OUTPATIENT)
Dept: CT IMAGING | Age: 79
DRG: 335 | End: 2024-01-06
Payer: MEDICARE

## 2024-01-06 ENCOUNTER — APPOINTMENT (OUTPATIENT)
Dept: GENERAL RADIOLOGY | Age: 79
DRG: 335 | End: 2024-01-06
Payer: MEDICARE

## 2024-01-06 LAB
25(OH)D3 SERPL-MCNC: 41.2 NG/ML
ALBUMIN SERPL-MCNC: 2.4 G/DL (ref 3.5–5.2)
ALBUMIN/GLOB SERPL: 0.9 {RATIO} (ref 1–2.5)
ALP SERPL-CCNC: 57 U/L (ref 35–104)
ALT SERPL-CCNC: 6 U/L (ref 5–33)
ANION GAP SERPL CALCULATED.3IONS-SCNC: 8 MMOL/L (ref 9–17)
AST SERPL-CCNC: 10 U/L
BASOPHILS # BLD: <0.03 K/UL (ref 0–0.2)
BASOPHILS NFR BLD: 0 % (ref 0–2)
BILIRUB DIRECT SERPL-MCNC: 0.5 MG/DL
BILIRUB INDIRECT SERPL-MCNC: 0.6 MG/DL (ref 0–1)
BILIRUB SERPL-MCNC: 1.1 MG/DL (ref 0.3–1.2)
BNP SERPL-MCNC: 3134 PG/ML
BUN SERPL-MCNC: 9 MG/DL (ref 8–23)
BUN/CREAT SERPL: 23 (ref 9–20)
CALCIUM SERPL-MCNC: 7.8 MG/DL (ref 8.6–10.4)
CHLORIDE SERPL-SCNC: 99 MMOL/L (ref 98–107)
CO2 SERPL-SCNC: 29 MMOL/L (ref 20–31)
CREAT SERPL-MCNC: 0.4 MG/DL (ref 0.5–0.9)
EOSINOPHIL # BLD: 0.04 K/UL (ref 0–0.44)
EOSINOPHILS RELATIVE PERCENT: 0 % (ref 1–4)
ERYTHROCYTE [DISTWIDTH] IN BLOOD BY AUTOMATED COUNT: 13.2 % (ref 11.8–14.4)
FERRITIN SERPL-MCNC: 1123 NG/ML (ref 13–150)
FOLATE SERPL-MCNC: 10.3 NG/ML
GFR SERPL CREATININE-BSD FRML MDRD: >60 ML/MIN/1.73M2
GLUCOSE BLD-MCNC: 136 MG/DL (ref 74–100)
GLUCOSE BLD-MCNC: 162 MG/DL (ref 74–100)
GLUCOSE SERPL-MCNC: 152 MG/DL (ref 70–99)
HCT VFR BLD AUTO: 30.1 % (ref 36.3–47.1)
HGB BLD-MCNC: 10.4 G/DL (ref 11.9–15.1)
IMM GRANULOCYTES # BLD AUTO: 0.15 K/UL (ref 0–0.3)
IMM GRANULOCYTES NFR BLD: 1 %
IMM RETICS NFR: 17.2 % (ref 2.7–18.3)
IRON SATN MFR SERPL: 26 % (ref 20–55)
IRON SERPL-MCNC: 22 UG/DL (ref 37–145)
LYMPHOCYTES NFR BLD: 1.57 K/UL (ref 1.1–3.7)
LYMPHOCYTES RELATIVE PERCENT: 13 % (ref 24–43)
MAGNESIUM SERPL-MCNC: 2 MG/DL (ref 1.6–2.6)
MCH RBC QN AUTO: 30.5 PG (ref 25.2–33.5)
MCHC RBC AUTO-ENTMCNC: 34.6 G/DL (ref 28.4–34.8)
MCV RBC AUTO: 88.3 FL (ref 82.6–102.9)
MONOCYTES NFR BLD: 1.04 K/UL (ref 0.1–1.2)
MONOCYTES NFR BLD: 8 % (ref 3–12)
NEUTROPHILS NFR BLD: 78 % (ref 36–65)
NEUTS SEG NFR BLD: 9.55 K/UL (ref 1.5–8.1)
NRBC BLD-RTO: 0 PER 100 WBC
PHOSPHATE SERPL-MCNC: 2.8 MG/DL (ref 2.6–4.5)
PLATELET # BLD AUTO: 208 K/UL (ref 138–453)
PMV BLD AUTO: 8.9 FL (ref 8.1–13.5)
POTASSIUM SERPL-SCNC: 2.9 MMOL/L (ref 3.7–5.3)
POTASSIUM SERPL-SCNC: 3.5 MMOL/L (ref 3.7–5.3)
PROT SERPL-MCNC: 5.1 G/DL (ref 6.4–8.3)
RBC # BLD AUTO: 3.41 M/UL (ref 3.95–5.11)
RETIC HEMOGLOBIN: 27.4 PG (ref 28.2–35.7)
RETICS # AUTO: 0.04 M/UL (ref 0.03–0.08)
RETICS/RBC NFR AUTO: 1.3 % (ref 0.5–1.9)
SODIUM SERPL-SCNC: 136 MMOL/L (ref 135–144)
TIBC SERPL-MCNC: 86 UG/DL (ref 250–450)
TSH SERPL DL<=0.05 MIU/L-ACNC: 1.29 UIU/ML (ref 0.3–5)
UNSATURATED IRON BINDING CAPACITY: 64 UG/DL (ref 112–347)
VIT B12 SERPL-MCNC: 766 PG/ML (ref 232–1245)
WBC OTHER # BLD: 12.4 K/UL (ref 3.5–11.3)

## 2024-01-06 PROCEDURE — 6360000002 HC RX W HCPCS

## 2024-01-06 PROCEDURE — 6370000000 HC RX 637 (ALT 250 FOR IP): Performed by: SURGERY

## 2024-01-06 PROCEDURE — 94669 MECHANICAL CHEST WALL OSCILL: CPT

## 2024-01-06 PROCEDURE — 36592 COLLECT BLOOD FROM PICC: CPT

## 2024-01-06 PROCEDURE — 94761 N-INVAS EAR/PLS OXIMETRY MLT: CPT

## 2024-01-06 PROCEDURE — 6360000002 HC RX W HCPCS: Performed by: NURSE PRACTITIONER

## 2024-01-06 PROCEDURE — 36415 COLL VENOUS BLD VENIPUNCTURE: CPT

## 2024-01-06 PROCEDURE — 2500000003 HC RX 250 WO HCPCS: Performed by: NURSE PRACTITIONER

## 2024-01-06 PROCEDURE — 97110 THERAPEUTIC EXERCISES: CPT

## 2024-01-06 PROCEDURE — 2580000003 HC RX 258: Performed by: NURSE PRACTITIONER

## 2024-01-06 PROCEDURE — 85045 AUTOMATED RETICULOCYTE COUNT: CPT

## 2024-01-06 PROCEDURE — 84100 ASSAY OF PHOSPHORUS: CPT

## 2024-01-06 PROCEDURE — 84443 ASSAY THYROID STIM HORMONE: CPT

## 2024-01-06 PROCEDURE — 82607 VITAMIN B-12: CPT

## 2024-01-06 PROCEDURE — 82947 ASSAY GLUCOSE BLOOD QUANT: CPT

## 2024-01-06 PROCEDURE — A4216 STERILE WATER/SALINE, 10 ML: HCPCS | Performed by: NURSE PRACTITIONER

## 2024-01-06 PROCEDURE — 94664 DEMO&/EVAL PT USE INHALER: CPT

## 2024-01-06 PROCEDURE — 2580000003 HC RX 258: Performed by: STUDENT IN AN ORGANIZED HEALTH CARE EDUCATION/TRAINING PROGRAM

## 2024-01-06 PROCEDURE — 94640 AIRWAY INHALATION TREATMENT: CPT

## 2024-01-06 PROCEDURE — 71250 CT THORAX DX C-: CPT

## 2024-01-06 PROCEDURE — 6360000002 HC RX W HCPCS: Performed by: STUDENT IN AN ORGANIZED HEALTH CARE EDUCATION/TRAINING PROGRAM

## 2024-01-06 PROCEDURE — 83540 ASSAY OF IRON: CPT

## 2024-01-06 PROCEDURE — 2700000000 HC OXYGEN THERAPY PER DAY

## 2024-01-06 PROCEDURE — 83550 IRON BINDING TEST: CPT

## 2024-01-06 PROCEDURE — 2000000000 HC ICU R&B

## 2024-01-06 PROCEDURE — 83880 ASSAY OF NATRIURETIC PEPTIDE: CPT

## 2024-01-06 PROCEDURE — 80048 BASIC METABOLIC PNL TOTAL CA: CPT

## 2024-01-06 PROCEDURE — 71045 X-RAY EXAM CHEST 1 VIEW: CPT

## 2024-01-06 PROCEDURE — 84132 ASSAY OF SERUM POTASSIUM: CPT

## 2024-01-06 PROCEDURE — 83735 ASSAY OF MAGNESIUM: CPT

## 2024-01-06 PROCEDURE — 80076 HEPATIC FUNCTION PANEL: CPT

## 2024-01-06 PROCEDURE — 85025 COMPLETE CBC W/AUTO DIFF WBC: CPT

## 2024-01-06 PROCEDURE — 82306 VITAMIN D 25 HYDROXY: CPT

## 2024-01-06 PROCEDURE — 82746 ASSAY OF FOLIC ACID SERUM: CPT

## 2024-01-06 PROCEDURE — 74018 RADEX ABDOMEN 1 VIEW: CPT

## 2024-01-06 PROCEDURE — C9113 INJ PANTOPRAZOLE SODIUM, VIA: HCPCS | Performed by: NURSE PRACTITIONER

## 2024-01-06 PROCEDURE — 82728 ASSAY OF FERRITIN: CPT

## 2024-01-06 RX ORDER — FUROSEMIDE 10 MG/ML
20 INJECTION INTRAMUSCULAR; INTRAVENOUS ONCE
Status: COMPLETED | OUTPATIENT
Start: 2024-01-06 | End: 2024-01-06

## 2024-01-06 RX ORDER — SODIUM CHLORIDE FOR INHALATION 3 %
4 VIAL, NEBULIZER (ML) INHALATION 2 TIMES DAILY PRN
Status: DISCONTINUED | OUTPATIENT
Start: 2024-01-06 | End: 2024-01-09 | Stop reason: HOSPADM

## 2024-01-06 RX ORDER — BISACODYL 10 MG
10 SUPPOSITORY, RECTAL RECTAL DAILY PRN
Status: DISCONTINUED | OUTPATIENT
Start: 2024-01-06 | End: 2024-01-09 | Stop reason: HOSPADM

## 2024-01-06 RX ORDER — METHYLPREDNISOLONE SODIUM SUCCINATE 40 MG/ML
40 INJECTION, POWDER, LYOPHILIZED, FOR SOLUTION INTRAMUSCULAR; INTRAVENOUS DAILY
Status: DISCONTINUED | OUTPATIENT
Start: 2024-01-06 | End: 2024-01-09 | Stop reason: HOSPADM

## 2024-01-06 RX ADMIN — ONDANSETRON 4 MG: 2 INJECTION INTRAMUSCULAR; INTRAVENOUS at 21:14

## 2024-01-06 RX ADMIN — OLIVE OIL AND SOYBEAN OIL 6.6 ML/HR: 16; 4 INJECTION, EMULSION INTRAVENOUS at 17:02

## 2024-01-06 RX ADMIN — MORPHINE SULFATE 0.5 MG: 2 INJECTION, SOLUTION INTRAMUSCULAR; INTRAVENOUS at 21:13

## 2024-01-06 RX ADMIN — FUROSEMIDE 20 MG: 10 INJECTION, SOLUTION INTRAMUSCULAR; INTRAVENOUS at 15:54

## 2024-01-06 RX ADMIN — ALBUTEROL SULFATE 2.5 MG: 2.5 SOLUTION RESPIRATORY (INHALATION) at 11:28

## 2024-01-06 RX ADMIN — Medication 4 MCG/MIN: at 02:24

## 2024-01-06 RX ADMIN — POTASSIUM CHLORIDE 10 MEQ: 7.46 INJECTION, SOLUTION INTRAVENOUS at 07:02

## 2024-01-06 RX ADMIN — POTASSIUM CHLORIDE 10 MEQ: 7.46 INJECTION, SOLUTION INTRAVENOUS at 11:27

## 2024-01-06 RX ADMIN — POTASSIUM CHLORIDE 10 MEQ: 7.46 INJECTION, SOLUTION INTRAVENOUS at 13:36

## 2024-01-06 RX ADMIN — FUROSEMIDE 20 MG: 10 INJECTION, SOLUTION INTRAMUSCULAR; INTRAVENOUS at 11:11

## 2024-01-06 RX ADMIN — POTASSIUM CHLORIDE 10 MEQ: 7.46 INJECTION, SOLUTION INTRAVENOUS at 10:23

## 2024-01-06 RX ADMIN — CEFEPIME 2000 MG: 2 INJECTION, POWDER, FOR SOLUTION INTRAVENOUS at 11:21

## 2024-01-06 RX ADMIN — ALBUTEROL SULFATE 2.5 MG: 2.5 SOLUTION RESPIRATORY (INHALATION) at 04:42

## 2024-01-06 RX ADMIN — BISACODYL 10 MG: 10 SUPPOSITORY RECTAL at 12:10

## 2024-01-06 RX ADMIN — ALBUTEROL SULFATE 2.5 MG: 2.5 SOLUTION RESPIRATORY (INHALATION) at 00:36

## 2024-01-06 RX ADMIN — ALBUTEROL SULFATE 2.5 MG: 2.5 SOLUTION RESPIRATORY (INHALATION) at 20:35

## 2024-01-06 RX ADMIN — METHYLPREDNISOLONE SODIUM SUCCINATE 40 MG: 40 INJECTION INTRAMUSCULAR; INTRAVENOUS at 13:35

## 2024-01-06 RX ADMIN — SODIUM CHLORIDE, PRESERVATIVE FREE 10 ML: 5 INJECTION INTRAVENOUS at 11:11

## 2024-01-06 RX ADMIN — PANTOPRAZOLE SODIUM 40 MG: 40 INJECTION, POWDER, FOR SOLUTION INTRAVENOUS at 11:10

## 2024-01-06 RX ADMIN — ENOXAPARIN SODIUM 30 MG: 100 INJECTION SUBCUTANEOUS at 11:11

## 2024-01-06 RX ADMIN — PIPERACILLIN AND TAZOBACTAM 3375 MG: 3; .375 INJECTION, POWDER, LYOPHILIZED, FOR SOLUTION INTRAVENOUS at 06:59

## 2024-01-06 RX ADMIN — POTASSIUM CHLORIDE, DEXTROSE MONOHYDRATE AND SODIUM CHLORIDE: 150; 5; 450 INJECTION, SOLUTION INTRAVENOUS at 02:23

## 2024-01-06 RX ADMIN — ONDANSETRON 4 MG: 2 INJECTION INTRAMUSCULAR; INTRAVENOUS at 07:03

## 2024-01-06 RX ADMIN — POTASSIUM CHLORIDE 10 MEQ: 7.46 INJECTION, SOLUTION INTRAVENOUS at 12:30

## 2024-01-06 RX ADMIN — ASCORBIC ACID, VITAMIN A PALMITATE, CHOLECALCIFEROL, THIAMINE HYDROCHLORIDE, RIBOFLAVIN-5 PHOSPHATE SODIUM, PYRIDOXINE HYDROCHLORIDE, NIACINAMIDE, DEXPANTHENOL, ALPHA-TOCOPHEROL ACETATE, VITAMIN K1, FOLIC ACID, BIOTIN, CYANOCOBALAMIN: 200; 3300; 200; 6; 3.6; 6; 40; 15; 10; 150; 600; 60; 5 INJECTION, SOLUTION INTRAVENOUS at 17:01

## 2024-01-06 RX ADMIN — MORPHINE SULFATE 0.5 MG: 2 INJECTION, SOLUTION INTRAMUSCULAR; INTRAVENOUS at 06:52

## 2024-01-06 RX ADMIN — ALBUTEROL SULFATE 2.5 MG: 2.5 SOLUTION RESPIRATORY (INHALATION) at 16:22

## 2024-01-06 RX ADMIN — POTASSIUM CHLORIDE 10 MEQ: 7.46 INJECTION, SOLUTION INTRAVENOUS at 07:59

## 2024-01-06 RX ADMIN — SODIUM CHLORIDE, PRESERVATIVE FREE 10 ML: 5 INJECTION INTRAVENOUS at 21:02

## 2024-01-06 RX ADMIN — SODIUM CHLORIDE, PRESERVATIVE FREE 10 ML: 5 INJECTION INTRAVENOUS at 21:03

## 2024-01-06 RX ADMIN — ALBUTEROL SULFATE 2.5 MG: 2.5 SOLUTION RESPIRATORY (INHALATION) at 08:59

## 2024-01-06 ASSESSMENT — PAIN DESCRIPTION - DESCRIPTORS
DESCRIPTORS: ACHING
DESCRIPTORS: ACHING

## 2024-01-06 ASSESSMENT — PAIN SCALES - GENERAL
PAINLEVEL_OUTOF10: 0
PAINLEVEL_OUTOF10: 4
PAINLEVEL_OUTOF10: 0
PAINLEVEL_OUTOF10: 4

## 2024-01-06 ASSESSMENT — PAIN DESCRIPTION - ORIENTATION
ORIENTATION: MID
ORIENTATION: MID

## 2024-01-06 ASSESSMENT — PAIN DESCRIPTION - ONSET
ONSET: ON-GOING
ONSET: ON-GOING

## 2024-01-06 ASSESSMENT — PAIN DESCRIPTION - LOCATION
LOCATION: BACK
LOCATION: ABDOMEN;BACK

## 2024-01-06 ASSESSMENT — PAIN - FUNCTIONAL ASSESSMENT
PAIN_FUNCTIONAL_ASSESSMENT: PREVENTS OR INTERFERES SOME ACTIVE ACTIVITIES AND ADLS
PAIN_FUNCTIONAL_ASSESSMENT: ACTIVITIES ARE NOT PREVENTED

## 2024-01-06 ASSESSMENT — PAIN DESCRIPTION - PAIN TYPE
TYPE: SURGICAL PAIN;CHRONIC PAIN
TYPE: CHRONIC PAIN

## 2024-01-06 ASSESSMENT — PAIN DESCRIPTION - FREQUENCY
FREQUENCY: CONTINUOUS
FREQUENCY: CONTINUOUS

## 2024-01-06 NOTE — PROGRESS NOTES
Entered patient's room for afternoon head to toe reassessment. Patient resting in the bed at this time. A&O x4, calm, and cooperative. Patient denies of pain at this time. Vital signs and head to toe reassessment completed at this time, see flowsheets for more details. Patient denies no more needs at this time. Call light within reach. Bed alarm on. Bed wheels locked. Bed in lowest position.

## 2024-01-06 NOTE — PROGRESS NOTES
Entered patient's room for morning head to toe assessment. Patient resting in the bed at this time with daughter at the bedside. A&O x4, calm, cooperative, but patient does have a flat affect. Patient complains of pain at this time in her back, PRN pain medication given at this time, see MAR. Head to toe assessment completed at this time, see flowsheets for more details. Potassium replacement protocol started at this time. Patient denies no more needs at this time. Call light within reach. Bed alarm on. Bed wheels locked. Bed in lowest position.

## 2024-01-06 NOTE — PROGRESS NOTES
Physical Therapy  Facility/Department: [facility] ICU  Daily Treatment Note  NAME: Grace Gonzalez  : 1945  MRN: [id number]    Date of Service: 2024    Discharge Recommendations:  Continue to assess pending progress, Subacute/Skilled Nursing Facility     Patient Diagnosis(es): The primary encounter diagnosis was Small bowel obstruction (HCC). Diagnoses of Other specified intestinal obstruction, unspecified whether partial or complete (HCC) and Acute respiratory failure with hypoxia (HCC) were also pertinent to this visit.    Assessment   Assessment: Pt just returned from CT scan and agreeable to supine exercises only at this time. Supine therex BLE x10-15 reps in all available planes, occassional rest breaks t/o due to stomach pains.  Activity Tolerance: Patient limited by fatigue;Patient limited by pain;Patient limited by endurance     Plan    Physical Therapy Plan  General Plan: 2 times a day 7 days a week  Specific Instructions for Next Treatment: 1x/daily on weekends and holidays     Restrictions  Restrictions/Precautions  Restrictions/Precautions: NPO, General Precautions, Fall Risk, Up as Tolerated  Required Braces or Orthoses?: No  Lower Extremity Weight Bearing Restrictions  Right Lower Extremity Weight Bearing: Weight Bearing As Tolerated     Subjective    Subjective  Subjective: Pt just got back from CT scan, agreeable to bed exercises only.  Pain: increased abdominal pain with movement but does not rate  Orientation  Overall Orientation Status: Within Functional Limits  Cognition  Overall Cognitive Status: WFL     Objective  Bed Mobility Training  Bed Mobility Training: No  Transfer Training  Transfer Training: No  Gait Training  Gait Training: No  PT Exercises  Exercise Treatment: Supine therex BLE 10-15 in all available planes, occassional rest breaks t/o due to stomach pains  Safety Devices  Type of Devices: All fall risk precautions in place;Call light within reach;Left in bed;Nurse notified

## 2024-01-06 NOTE — PROGRESS NOTES
Occupational Therapy  Facility/Department: [facility] ICU  Daily Treatment Note  NAME: Grace Gonzalez  : 1945  MRN: [id number]    Date of Service: 2024    Discharge Recommendations:  Continue to assess pending progress, Subacute/Skilled Nursing Facility         Patient Diagnosis(es): The primary encounter diagnosis was Small bowel obstruction (HCC). Diagnoses of Other specified intestinal obstruction, unspecified whether partial or complete (HCC) and Acute respiratory failure with hypoxia (HCC) were also pertinent to this visit.     Assessment    Activity Tolerance: Patient limited by fatigue;Patient limited by pain;Patient limited by endurance  Discharge Recommendations: Continue to assess pending progress;Subacute/Skilled Nursing Facility      Plan   Occupational Therapy Plan  Times Per Day: Once a day  Days Per Week: 7 Days  Current Treatment Recommendations: Strengthening;Balance training;Functional mobility training;Endurance training;Safety education & training;Patient/Caregiver education & training;Positioning;Self-Care / ADL;Home management training     Restrictions  Restrictions/Precautions  Restrictions/Precautions: NPO;General Precautions;Fall Risk;Up as Tolerated    Subjective   Subjective  Subjective: Pt lying in bed upon arrival. Pt agreed to participate in bed exercises.  Pain: Pt reported abdominal pain however did not rate.          Objective    Vitals             OT Exercises  Exercise Treatment: Pt completed BUE ther ex x 4 planes x 15 reps x 1 set to increase UE strength and endurance in order to ease completion of ADL tasks. Pt required RBs as needed secondary to fatigue.     Safety Devices  Type of Devices: All fall risk precautions in place;Call light within reach;Left in bed;Nurse notified     Patient Education  Education Given To: Patient  Education Provided: Role of Therapy;Plan of Care;Home Exercise Program  Education Method: Verbal  Barriers to Learning: None  Education Outcome:

## 2024-01-06 NOTE — PROGRESS NOTES
Dr Dominguez called and updated on CT results, no new orders at this time, family updated as well. They will discuss with patient and decide what to do. Daughter will notify nurse when ready.

## 2024-01-06 NOTE — PROGRESS NOTES
Writer asked patient would like to be washed up at this time. Patient shook head no. Writer asked if she would like to be turned at this time. Patient shook head no.

## 2024-01-06 NOTE — PROGRESS NOTES
RESPIRATORY ASSESSMENT PROTOCOL                                                                                              Patient Name: Grace Gonzalez Room#: I307/I307-01 : 1945     Admitting diagnosis: Small bowel obstruction (HCC) [K56.609]       Medical History:   Past Medical History:   Diagnosis Date    Arthritis     Asthma     Blood circulation, collateral     Cancer (HCC)     COPD (chronic obstructive pulmonary disease) (HCC)     HAP (hospital-acquired pneumonia) 2024    Pneumonia        PATIENT ASSESSMENT    LABORATORY DATA  Hematology:   Lab Results   Component Value Date/Time    WBC 12.4 2024 05:53 AM    RBC 3.41 2024 05:53 AM    HGB 10.4 2024 05:53 AM    HCT 30.1 2024 05:53 AM     2024 05:53 AM     Chemistry:    Lab Results   Component Value Date/Time    PHART 7.512 2024 11:49 AM    JMS8WUA 32.4 2024 11:49 AM    PO2ART 39.2 2024 11:49 AM    F1SVSXDJ 79.9 2024 11:49 AM    ZBC3PBI 25.4 2024 11:49 AM    PBEA 3.0 2024 11:49 AM       VITALS  Pulse: (!) 104   Respirations: (!) 34  BP: (!) 96/49  SpO2: 97 % O2 Device: (S) Heated high flow cannula (Changed oxygen source due to WOB and low SpO2)  Temp: 98.1 °F (36.7 °C)    SKIN COLOR  [x] Normal  [] Pale  [] Dusky  [] Cyanotic    RESPIRATORY PATTERN  [] Normal  [x] Dyspnea  [] Cheyne-Esparza  [] Kussmaul  [] Biots    AMBULATORY  [] Yes  [] No  [x] With Assistance      Patient Acuity 0 1 2 3 4 Score   Level of Consciousness (LOC) [x]  Alert & Oriented or Pt normal LOC []  Confused;follows directions []  Confused & uncooper-ative []  Obtunded []  Comatose 0   Respiratory Rate  (RR) []  Reg. rate & pattern. 12 - 20 bpm  []  Increased RR. Greater than 20 bpm   [x]  SOB w/ exertion or RR greater than 24 bpm []  Access- ory muscle use at rest. Abn.  resp. []  SOB at rest.   2   Bilateral Breath Sounds (BBS) []  Clear []  Diminish-ed bases  [x]  Diminish-ed t/o, or rales

## 2024-01-06 NOTE — PROGRESS NOTES
Dr. Dominguez called for an update on the family and patient's decision about treatment plan. Family is leaning more towards not transferring the patient and seeing how conservative treatment goes. Family asking if NG tube could come out/or patient be put on clear liquids because she had a small bowel movement this afternoon. Dr. Dominguez referred to talking to Dr. Xiong about advancing the diet and NG tube.     Dr. Dominguez did speak to the pulmonologist about possible transfer. Pulmonologist would accept the patient at [facility] if the family wants to transfer. Conversation needs to be had with patient and family.

## 2024-01-06 NOTE — PROGRESS NOTES
TPN Follow Up Note: Pharmacy Consult    Today's Labs:  Recent Labs     01/06/24  0553        Recent Labs     01/06/24  0553   CL 99     Recent Labs     01/06/24  0553   K 2.9*     Recent Labs     01/06/24  0553   MG 2.0     Recent Labs     01/06/24  0553   PHOS 2.8     Recent Labs     01/06/24  0553   GLUCOSE 152*     Recent Labs     01/06/24  0553   BUN 9         Assessment: Potassium level low at 2.9  Electrolyte Replacement: KCL 10 meq IVPB every hour x 6 doses for a total of 60 meq per KCL prn replacement protocol    TPN changes for (today) at 1800: Increase rate to 66.7 ml/hr     PN Product Dispensed: CLINIMIX-E  4.25/5   Lipid Product Dispensed: Intralipid 20%    Rates of Infusion   Lipid Infusion Rate: 6.6  ML/hr x 12 hours   Clinimix-E Infusion Rate: 66.7 mL/hr          Electrolytes (per bag) Clinimix E   Na= 70 mEq/Bag (2000ml)   K= 60 mEq/Bag (2000ml)              Mg= 10 mEq/Bag (2000ml)              Ca= 4.4 mmol/Bag (2000ml)              Acetate= 140 mEq/Bag (2000ml)               Chloride= 78 mEq/Bag (2000ml)              Phosphate= 30mMol/Bag (2000ml)                            MVI= 10 mL/Bag 2000ml   Trace Elements= 1 mL/Bag (2000ml)  **NOTE: Electrolytes are based upon the total volume of the TPN bag (2000ml).  The amount of electrolytes received is based upon the rate/volume of the total infusion.    Thank you,  Skye Coronado, PharmD, 1/6/2024 9:49 AM

## 2024-01-06 NOTE — PROGRESS NOTES
IntraVENous 2 times per day Mai Holly APRN - CNP   10 mL at 01/05/24 2042    sodium chloride flush 0.9 % injection 5-40 mL  5-40 mL IntraVENous PRN Mai Holly APRN - CNP        0.9 % sodium chloride infusion  25 mL IntraVENous PRN Mai Holly APRN - CNP        norepinephrine (LEVOPHED) 16 mg in sodium chloride 0.9 % 250 mL infusion  1-100 mcg/min IntraVENous Continuous Mai Holly APRN - CNP 3.8 mL/hr at 01/06/24 0703 4 mcg/min at 01/06/24 0703    potassium chloride 10 mEq/100 mL IVPB (Peripheral Line)  10 mEq IntraVENous PRN Irene Bermudez APRN -  mL/hr at 01/06/24 0759 10 mEq at 01/06/24 0759    sodium chloride 0.9 % bolus 500 mL  500 mL IntraVENous Once Mai Holly APRN - CNP        albuterol (PROVENTIL) (2.5 MG/3ML) 0.083% nebulizer solution 2.5 mg  2.5 mg Nebulization Q4H Mai Holly APRN - CNP   2.5 mg at 01/06/24 0442    albuterol (PROVENTIL) (2.5 MG/3ML) 0.083% nebulizer solution 2.5 mg  2.5 mg Nebulization Q2H PRN Mai Holly APRN - CNP        morphine (PF) injection 0.5 mg  0.5 mg IntraVENous Q2H PRN Mai Holly APRN - CNP   0.5 mg at 01/06/24 0652    ondansetron (ZOFRAN) injection 4 mg  4 mg IntraVENous Q6H PRN Mai Holly APRN - CNP   4 mg at 01/06/24 0703    enoxaparin Sodium (LOVENOX) injection 30 mg  30 mg SubCUTAneous Daily Mai Holly APRN - CNP   30 mg at 01/05/24 0810    pantoprazole (PROTONIX) 40 mg in sodium chloride (PF) 0.9 % 10 mL injection  40 mg IntraVENous Daily Mai Holly APRN - CNP   40 mg at 01/05/24 0810    calcitonin (MIACALCIN) nasal spray 1 spray     (Patient Supplied)  1 spray Alternating Nares Daily Mai Holly APRN - CNP        sodium chloride flush 0.9 % injection 5-40 mL  5-40 mL IntraVENous 2 times per day Mai Holly APRN - CNP   10 mL at 01/05/24 0820    sodium chloride flush 0.9 %  injection 10 mL  10 mL IntraVENous PRN Mai Holly APRN - CNP        0.9 % sodium chloride infusion   IntraVENous PRN Mai Holly APRN - CNP        potassium chloride (KLOR-CON M) extended release tablet 40 mEq  40 mEq Oral PRN Mai Holly APRN - CNP        Or    potassium bicarb-citric acid (EFFER-K) effervescent tablet 40 mEq  40 mEq Oral PRN Mai Holly APRN - CNP        Or    potassium chloride 10 mEq/100 mL IVPB (Peripheral Line)  10 mEq IntraVENous PRN Mai Holly APRN -  mL/hr at 24 1518 10 mEq at 24 1518    magnesium sulfate 2000 mg in 50 mL IVPB premix  2,000 mg IntraVENous PRN Mia Holly APRN - CNP   Stopped at 24 1354    fentaNYL (SUBLIMAZE) injection 50 mcg  50 mcg IntraVENous Q4H PRN Mai Holly APRN - CNP        dextrose 5 % and 0.45 % NaCl with KCl 20 mEq infusion   IntraVENous Continuous Mai Holly APRN - CNP 50 mL/hr at 24 0223 New Bag at 24 0223       ASSESSMENT:     Principal Problem:    Small bowel obstruction (HCC) likely adhesion related from prior  and hysterectomy  Active Problems:    Severe malnutrition (HCC)    COPD with hypoxia (HCC)    HAP (hospital-acquired pneumonia) / LLL /     Tobacco abuse disorder / long term smoker  Resolved Problems:    * No resolved hospital problems. *      PLAN:     Primary Problem(s): Small bowel obstruction (HCC)  Condition is at treatment goal  Treatment plan: Continue current treatment  Imaging: Xray Chest ordered, KUB and ECHO CT Chest  Medications: Continue current medications, Zosyn  Medication Monitoring / High Risk Medications: none   GS is following closely  IV Protonix  NG to LIS  Monitor labs closely  Given anemia, obtain Iron studies, vitD/B12  K replacement, Mg replacement as indicated  Net post 2.5L  IVF for now, D5 1/2 50cc/hr  IV Lasix x1, 20mg  Remain NPO for now  Pain control as

## 2024-01-06 NOTE — PROGRESS NOTES
Entered patient's room for afternoon head to toe reassessment. Patient resting in the chair at this time. A&O x4, calm, and cooperative. Patient denies of pain at this time. Head to toe reassessment completed at this time, see flowsheets for more details. Lung sounds continue to sound diminished bilaterally. Patient denies no more needs at this time. Call light within reach. Chair alarm on. Chair/bed wheels locked. Bed in lowest position.

## 2024-01-06 NOTE — PROGRESS NOTES
Pt resting in bed quietly with eyes closed. Respirations even and unlabored. No distress noted. Pt wakes easily to verbal stimuli. Assessment and vital signs as charted. Pt denies pain at this time. Call light in reach. Bed alarm on. Will continue to monitor.

## 2024-01-06 NOTE — PROGRESS NOTES
Respiratory called at this time because patient's oxygen saturation is hanging at 85% after increasing the patient's oxygen flow rate to 9L. Patient was encouraged to cough and take deep breaths. Patient up in the chair at this time and sitting up straight.

## 2024-01-06 NOTE — PROGRESS NOTES
Pt resting in bed awake with daughter at bedside. Assessment and vital signs as charted. Pt c/o pain 3/10 but denies any pain medication at this time. Cardiac monitor shows NSR. Oxymask on at 6L. Continuous pulse ox WNL. IV fluids and medications infusing as ordered. Calderon catheter intact, draining and patent. NG tube intact and hooked to LIWS. Pt denies any other needs at this time. Call light in reach. Bed alarm on. Will continue to monitor.

## 2024-01-06 NOTE — PLAN OF CARE
Problem: Pain  Goal: Verbalizes/displays adequate comfort level or baseline comfort level  1/5/2024 2139 by Jeannie Cruz RN  Outcome: Progressing  Flowsheets  Taken 1/5/2024 2139 by Jeannie Cruz RN  Verbalizes/displays adequate comfort level or baseline comfort level:   Encourage patient to monitor pain and request assistance   Administer analgesics based on type and severity of pain and evaluate response   Assess pain using appropriate pain scale   Implement non-pharmacological measures as appropriate and evaluate response    Problem: Safety - Adult  Goal: Free from fall injury  1/5/2024 2139 by Jeannie Cruz RN  Outcome: Progressing  Flowsheets (Taken 1/5/2024 2139)  Free From Fall Injury: Instruct family/caregiver on patient safety  Note: Pt is at high risk for falls. Non skid socks on. Bed in low position and wheels locked. Fall sign posted and bracelet on. Siderails up x 2. Bed alarm on. Call light within reach. Will continue to assess.     Problem: Skin/Tissue Integrity  Goal: Absence of new skin breakdown  Description: 1.  Monitor for areas of redness and/or skin breakdown  2.  Assess vascular access sites hourly  3.  Every 4-6 hours minimum:  Change oxygen saturation probe site  4.  Every 4-6 hours:  If on nasal continuous positive airway pressure, respiratory therapy assess nares and determine need for appliance change or resting period.  1/5/2024 2139 by Jeannie Cruz RN  Outcome: Progressing  Note: Skin assessed at regular intervals. Assistance with repositioning given as needed. Will continue to assess.     Problem: Respiratory - Adult  Goal: Achieves optimal ventilation and oxygenation  1/5/2024 2139 by Jeannie Cruz RN  Outcome: Progressing  Flowsheets (Taken 1/5/2024 2139)  Achieves optimal ventilation and oxygenation:   Assess for changes in respiratory status   Position to facilitate oxygenation and minimize respiratory effort   Assess the need for suctioning and  aspirate as needed   Respiratory therapy support as indicated   Assess for changes in mentation and behavior   Oxygen supplementation based on oxygen saturation or arterial blood gases   Encourage broncho-pulmonary hygiene including cough, deep breathe, incentive spirometry   Assess and instruct to report shortness of breath or any respiratory difficulty  Note: Pulse ox WNL. Oxymask on at 6L. SOB noted with exertion. Pt denies any discomfort. Will continue to assess.

## 2024-01-06 NOTE — PROGRESS NOTES
Cefepime Extended Interval Interchange    The following ordered dose of Cefepime has been changed to optimize its pharmacodynamic profile as approved by the Patient Care Review Committee.    Ordered Dose    __ 1 gm IV every 8-12 hrs  (30 minute infusion)      _x_ 2 gm  IV every 8-12 hrs (30 minute infusion)      Recent Labs     01/04/24  0535 01/05/24  0518 01/06/24  0553   CREATININE 0.4* 0.3* 0.4*     Estimated Creatinine Clearance: 90 mL/min (A) (based on SCr of 0.4 mg/dL (L)).      CrCl Dose   >60 1-2 gm q8-12hrs over 4 hours   30-59 or CRRT 1-2gm [id number] over 4 hours   <30 500mg-2gm q24hrs over 4 hours or 1gm q12hrs over 4 hours   <10 or HD 500mg-1gm q24hrs over 4 hours       New Dose    Cefepime   2 gm  IV q 12 hrs  over 4 hours.      Pharmacists should be contacted for issues concerning drug compatibility with multiple IV medications.  All doses will be prepared using 100ml bag to be infused over 4-hours at a rate of 25 ml/hr.    Thank You,  Skye Coronado, PharmD, 1/6/2024 11:09 AM

## 2024-01-06 NOTE — PROGRESS NOTES
Pt resting in bed quietly awake. Assessment and vital signs as charted. Pt denies pain at this time. Pt is A & O x 4. Cardiac monitor continues to show NSR. Calderon catheter remains intact, draining and patent. IV fluids and medications infusing as ordered. Continuous pulse ox WNL. Oxymask on at 8L. Pt denies any other needs at this time. Call light in reach. Will continue to monitor.

## 2024-01-06 NOTE — PROGRESS NOTES
Comprehensive Nutrition Assessment    Type and Reason for Visit:  Reassess    Nutrition Recommendations/Plan:   Advance PN   Continue monitor GI status.     Nutrition Assessment:    Continued malnutrition on PN (peripheral) with now hypoactive b/s. Mg++ and PO4 both wnl at 2.0 and 2.8 respectively. Glycemia running 123-152 mg/dl after yesterdays questionable 298 mg/dl. Both bun/creatinine improved some and may be r/t protein infusion. Will recommend to increase PN to 66.7 ml/hr with 6.6 ml/hr IL 20% over 12 hours to provide 702 calories, 68 g AA (14.7 kavon/kg and 1.4 g/kg AA). Discussion with nursing and provider RE additional fluids to be discontinued at this time.    Nutrition Related Findings:    hypoactive b/s. non pitting edema. Wound Type: Surgical Incision       Current Nutrition Intake & Therapies:    Average Meal Intake: NPO  Average Supplements Intake: NPO  Current Parenteral Nutrition Orders:  Type and Formula: Premix Peripheral   Lipids: Daily  Duration: Continuous  Rate/Volume: 66.7 ml/hr Clinimix 4.25/5% with 6.6 ml/hr IL 20% x 12 hours  Current PN Order Provides: 702 calories, 68 g AA (14.7 kavon/kg/1.4 g/kg AA)  Goal PN Orders Provides: TBD    Anthropometric Measures:  Height: 162.6 cm (5' 4\")  Ideal Body Weight (IBW): 120 lbs (55 kg)    Admission Body Weight: 42.6 kg (94 lb)  Current Body Weight: 47.6 kg (105 lb), 81.3 % IBW. Weight Source: Bed Scale  Current BMI (kg/m2): 18  Usual Body Weight: 49.9 kg (110 lb)  % Weight Change (Calculated): -4.5  Weight Adjustment For: No Adjustment                 BMI Categories: Underweight (BMI less than 18.5)    Estimated Daily Nutrient Needs:  Energy Requirements Based On: Kcal/kg  Weight Used for Energy Requirements: Current  Energy (kcal/day): 2141-4178 (35-40)  Weight Used for Protein Requirements: Current  Protein (g/day): 66-80 (1.5-1.8)  Method Used for Fluid Requirements: 1 ml/kcal  Fluid (ml/day): 1800    Nutrition Diagnosis:   Severe malnutrition related

## 2024-01-06 NOTE — PROGRESS NOTES
GENERAL SURGERY PROGRESS NOTE  [facility]      Patient's Name/ Date of Birth/ Gender: Grace Gonzalez / 1945 (78 y.o.) / female     History of present Illness: No flatus or BM. TPN started last night and K low at 2.9 (being replaced). Still having respiratory concerns on non-rebreather. CXR today shows complete opacification of left lung most likely 2/2 mucus plugging. Abd Xray still shows dilated loops of small bowel on the left. No gas in the colon.     NG tube output recorded at 200+ ml /24 hours. Down from yesterday's 800+   Past Medical History:  has a past medical history of Arthritis, Asthma, Blood circulation, collateral, Cancer (HCC), COPD (chronic obstructive pulmonary disease) (HCC), HAP (hospital-acquired pneumonia), and Pneumonia.    Past Surgical History:   Past Surgical History:   Procedure Laterality Date    BREAST SURGERY       SECTION      EYE SURGERY      LAPAROTOMY N/A 2023    LAPAROTOMY EXPLORATORY performed by Brendan Dumont MD at [facility] OR    SKIN BIOPSY      RHODA AND BSO (CERVIX REMOVED)         Social History:  reports that she has been smoking cigarettes. She started smoking about 60 years ago. She has a 60.0 pack-year smoking history. She has never used smokeless tobacco. She reports that she does not drink alcohol and does not use drugs.    Family History: family history is not on file.    Review of Systems:   General: Denies fever, chills, night sweats, weight loss, malaise, fatigue  HEENT: Denies sore throat, sinus problems, allergic rhinosinusitis  Card: Denies chest pain, palpitations, orthopnea/PND. Denies h/o murmurs  Pulm: Denies cough, shortness of breath, PRABHAKAR  GI:  per HPI; denies history of constipation, diarrhea, hematochezia or melena  : Denies polyuria, dysuria, hematuria  Endo: Denies diabetes, thyroid problems.  Heme: Denies anemia, h/o bleeding or clotting problems.   Neuro: Denies h/o CVA, TIA  Skin: Denies rashes,    Component Value Date/Time    LACTA 1.7 12/30/2023 08:45 PM     No results found for: \"AMYLASE\"  Lab Results   Component Value Date/Time    LIPASE 10 12/30/2023 08:45 PM     Lab Results   Component Value Date/Time    INR 1.1 01/01/2024 08:25 AM     Shayne Vega MD  [phone] 1/6/2024      Narrative & Impression  EXAMINATION:  ONE XRAY VIEW OF THE CHEST     1/6/2024 7:29 am     COMPARISON:  01/04/2024.     HISTORY:  ORDERING SYSTEM PROVIDED HISTORY: Comparison study  TECHNOLOGIST PROVIDED HISTORY:  Comparison study     FINDINGS:  There is new complete opacification of the left hemithorax with abrupt  truncation of the left mainstem bronchus.  Mediastinal shift to the left.  Increased right basilar opacification, possibly edema or infiltrate.  No  right pleural effusion or pneumothorax.  Enteric catheter and right-sided  PICC line are in place.     IMPRESSION:  New complete opacification of the left hemithorax, likely due to mucous  plugging.     Increased right basilar opacification, edema versus infiltrate.     The findings were sent to the Radiology Results Communication Center at 9:53  am on 1/6/2024 to be communicated to a licensed caregiver.    EXAMINATION:  ONE SUPINE XRAY VIEW(S) OF THE ABDOMEN     1/6/2024 7:28 am     COMPARISON:  01/04/2024     HISTORY:  ORDERING SYSTEM PROVIDED HISTORY: Comparison study  TECHNOLOGIST PROVIDED HISTORY:  Comparison study     FINDINGS:  NG tube is seen coursing across the mid abdomen to terminate to the right of  the midline.  Multiple dilated small bowel loops are noted in the left half  of the abdomen and also in the lower abdomen.  There is slight decrease in  extent of visualized distended loops.  Midline laparotomy skin staples noted.  Favor postoperative ileus although cannot exclude obstruction.     IMPRESSION:  Multiple dilated small bowel loops in the left half of the abdomen and also  in the lower abdomen. Favor postoperative ileus although cannot

## 2024-01-07 ENCOUNTER — APPOINTMENT (OUTPATIENT)
Dept: GENERAL RADIOLOGY | Age: 79
DRG: 335 | End: 2024-01-07
Payer: MEDICARE

## 2024-01-07 LAB
ALBUMIN SERPL-MCNC: 2.4 G/DL (ref 3.5–5.2)
ALBUMIN/GLOB SERPL: 0.8 {RATIO} (ref 1–2.5)
ALP SERPL-CCNC: 56 U/L (ref 35–104)
ALT SERPL-CCNC: 10 U/L (ref 5–33)
ANION GAP SERPL CALCULATED.3IONS-SCNC: 9 MMOL/L (ref 9–17)
AST SERPL-CCNC: 15 U/L
BASOPHILS # BLD: <0.03 K/UL (ref 0–0.2)
BASOPHILS NFR BLD: 0 % (ref 0–2)
BILIRUB DIRECT SERPL-MCNC: 0.3 MG/DL
BILIRUB INDIRECT SERPL-MCNC: 0.3 MG/DL (ref 0–1)
BILIRUB SERPL-MCNC: 0.6 MG/DL (ref 0.3–1.2)
BNP SERPL-MCNC: 2423 PG/ML
BUN SERPL-MCNC: 18 MG/DL (ref 8–23)
BUN/CREAT SERPL: 60 (ref 9–20)
CALCIUM SERPL-MCNC: 8.3 MG/DL (ref 8.6–10.4)
CHLORIDE SERPL-SCNC: 98 MMOL/L (ref 98–107)
CO2 SERPL-SCNC: 30 MMOL/L (ref 20–31)
CREAT SERPL-MCNC: 0.3 MG/DL (ref 0.5–0.9)
EKG ATRIAL RATE: 65 BPM
EKG P AXIS: 91 DEGREES
EKG P-R INTERVAL: 116 MS
EKG Q-T INTERVAL: 412 MS
EKG QRS DURATION: 82 MS
EKG QTC CALCULATION (BAZETT): 428 MS
EKG R AXIS: 75 DEGREES
EKG T AXIS: 67 DEGREES
EKG VENTRICULAR RATE: 65 BPM
EOSINOPHIL # BLD: <0.03 K/UL (ref 0–0.44)
EOSINOPHILS RELATIVE PERCENT: 0 % (ref 1–4)
ERYTHROCYTE [DISTWIDTH] IN BLOOD BY AUTOMATED COUNT: 13.2 % (ref 11.8–14.4)
GFR SERPL CREATININE-BSD FRML MDRD: >60 ML/MIN/1.73M2
GLUCOSE BLD-MCNC: 135 MG/DL (ref 74–100)
GLUCOSE BLD-MCNC: 167 MG/DL (ref 74–100)
GLUCOSE SERPL-MCNC: 127 MG/DL (ref 70–99)
HCT VFR BLD AUTO: 29.9 % (ref 36.3–47.1)
HGB BLD-MCNC: 10.4 G/DL (ref 11.9–15.1)
IMM GRANULOCYTES # BLD AUTO: 0.12 K/UL (ref 0–0.3)
IMM GRANULOCYTES NFR BLD: 1 %
LYMPHOCYTES NFR BLD: 1.26 K/UL (ref 1.1–3.7)
LYMPHOCYTES RELATIVE PERCENT: 11 % (ref 24–43)
MAGNESIUM SERPL-MCNC: 2.3 MG/DL (ref 1.6–2.6)
MCH RBC QN AUTO: 30.9 PG (ref 25.2–33.5)
MCHC RBC AUTO-ENTMCNC: 34.8 G/DL (ref 28.4–34.8)
MCV RBC AUTO: 88.7 FL (ref 82.6–102.9)
MONOCYTES NFR BLD: 1.11 K/UL (ref 0.1–1.2)
MONOCYTES NFR BLD: 10 % (ref 3–12)
NEUTROPHILS NFR BLD: 78 % (ref 36–65)
NEUTS SEG NFR BLD: 9.07 K/UL (ref 1.5–8.1)
NRBC BLD-RTO: 0 PER 100 WBC
PHOSPHATE SERPL-MCNC: 3.3 MG/DL (ref 2.6–4.5)
PLATELET # BLD AUTO: 215 K/UL (ref 138–453)
PMV BLD AUTO: 9.3 FL (ref 8.1–13.5)
POTASSIUM SERPL-SCNC: 3.4 MMOL/L (ref 3.7–5.3)
POTASSIUM SERPL-SCNC: 4.4 MMOL/L (ref 3.7–5.3)
PROT SERPL-MCNC: 5.5 G/DL (ref 6.4–8.3)
RBC # BLD AUTO: 3.37 M/UL (ref 3.95–5.11)
SODIUM SERPL-SCNC: 137 MMOL/L (ref 135–144)
TROPONIN I SERPL HS-MCNC: 17 NG/L (ref 0–14)
WBC OTHER # BLD: 11.6 K/UL (ref 3.5–11.3)

## 2024-01-07 PROCEDURE — 36415 COLL VENOUS BLD VENIPUNCTURE: CPT

## 2024-01-07 PROCEDURE — 6360000002 HC RX W HCPCS

## 2024-01-07 PROCEDURE — A4216 STERILE WATER/SALINE, 10 ML: HCPCS | Performed by: NURSE PRACTITIONER

## 2024-01-07 PROCEDURE — 94640 AIRWAY INHALATION TREATMENT: CPT

## 2024-01-07 PROCEDURE — 84484 ASSAY OF TROPONIN QUANT: CPT

## 2024-01-07 PROCEDURE — 6360000002 HC RX W HCPCS: Performed by: NURSE PRACTITIONER

## 2024-01-07 PROCEDURE — 2580000003 HC RX 258: Performed by: STUDENT IN AN ORGANIZED HEALTH CARE EDUCATION/TRAINING PROGRAM

## 2024-01-07 PROCEDURE — 80076 HEPATIC FUNCTION PANEL: CPT

## 2024-01-07 PROCEDURE — 84132 ASSAY OF SERUM POTASSIUM: CPT

## 2024-01-07 PROCEDURE — 2580000003 HC RX 258: Performed by: NURSE PRACTITIONER

## 2024-01-07 PROCEDURE — 82947 ASSAY GLUCOSE BLOOD QUANT: CPT

## 2024-01-07 PROCEDURE — 97110 THERAPEUTIC EXERCISES: CPT

## 2024-01-07 PROCEDURE — 6360000002 HC RX W HCPCS: Performed by: STUDENT IN AN ORGANIZED HEALTH CARE EDUCATION/TRAINING PROGRAM

## 2024-01-07 PROCEDURE — 94664 DEMO&/EVAL PT USE INHALER: CPT

## 2024-01-07 PROCEDURE — 80048 BASIC METABOLIC PNL TOTAL CA: CPT

## 2024-01-07 PROCEDURE — 74018 RADEX ABDOMEN 1 VIEW: CPT

## 2024-01-07 PROCEDURE — C9113 INJ PANTOPRAZOLE SODIUM, VIA: HCPCS | Performed by: NURSE PRACTITIONER

## 2024-01-07 PROCEDURE — 94669 MECHANICAL CHEST WALL OSCILL: CPT

## 2024-01-07 PROCEDURE — 84100 ASSAY OF PHOSPHORUS: CPT

## 2024-01-07 PROCEDURE — 2000000000 HC ICU R&B

## 2024-01-07 PROCEDURE — 93010 ELECTROCARDIOGRAM REPORT: CPT | Performed by: FAMILY MEDICINE

## 2024-01-07 PROCEDURE — 36592 COLLECT BLOOD FROM PICC: CPT

## 2024-01-07 PROCEDURE — 93005 ELECTROCARDIOGRAM TRACING: CPT | Performed by: STUDENT IN AN ORGANIZED HEALTH CARE EDUCATION/TRAINING PROGRAM

## 2024-01-07 PROCEDURE — 83880 ASSAY OF NATRIURETIC PEPTIDE: CPT

## 2024-01-07 PROCEDURE — 2700000000 HC OXYGEN THERAPY PER DAY

## 2024-01-07 PROCEDURE — 83735 ASSAY OF MAGNESIUM: CPT

## 2024-01-07 PROCEDURE — 85025 COMPLETE CBC W/AUTO DIFF WBC: CPT

## 2024-01-07 PROCEDURE — 71045 X-RAY EXAM CHEST 1 VIEW: CPT

## 2024-01-07 PROCEDURE — 2500000003 HC RX 250 WO HCPCS: Performed by: NURSE PRACTITIONER

## 2024-01-07 PROCEDURE — 94761 N-INVAS EAR/PLS OXIMETRY MLT: CPT

## 2024-01-07 RX ORDER — FUROSEMIDE 10 MG/ML
20 INJECTION INTRAMUSCULAR; INTRAVENOUS ONCE
Status: DISCONTINUED | OUTPATIENT
Start: 2024-01-07 | End: 2024-01-09 | Stop reason: HOSPADM

## 2024-01-07 RX ADMIN — METHYLPREDNISOLONE SODIUM SUCCINATE 40 MG: 40 INJECTION INTRAMUSCULAR; INTRAVENOUS at 08:07

## 2024-01-07 RX ADMIN — ALBUTEROL SULFATE 2.5 MG: 2.5 SOLUTION RESPIRATORY (INHALATION) at 00:34

## 2024-01-07 RX ADMIN — ALBUTEROL SULFATE 2.5 MG: 2.5 SOLUTION RESPIRATORY (INHALATION) at 11:42

## 2024-01-07 RX ADMIN — CEFEPIME 2000 MG: 2 INJECTION, POWDER, FOR SOLUTION INTRAVENOUS at 11:09

## 2024-01-07 RX ADMIN — OLIVE OIL AND SOYBEAN OIL 7.6 ML/HR: 16; 4 INJECTION, EMULSION INTRAVENOUS at 17:07

## 2024-01-07 RX ADMIN — MORPHINE SULFATE 0.5 MG: 2 INJECTION, SOLUTION INTRAMUSCULAR; INTRAVENOUS at 23:14

## 2024-01-07 RX ADMIN — CEFEPIME 2000 MG: 2 INJECTION, POWDER, FOR SOLUTION INTRAVENOUS at 23:18

## 2024-01-07 RX ADMIN — POTASSIUM CHLORIDE 10 MEQ: 7.46 INJECTION, SOLUTION INTRAVENOUS at 10:19

## 2024-01-07 RX ADMIN — SODIUM CHLORIDE, PRESERVATIVE FREE 10 ML: 5 INJECTION INTRAVENOUS at 08:08

## 2024-01-07 RX ADMIN — POTASSIUM CHLORIDE 10 MEQ: 7.46 INJECTION, SOLUTION INTRAVENOUS at 09:15

## 2024-01-07 RX ADMIN — ENOXAPARIN SODIUM 30 MG: 100 INJECTION SUBCUTANEOUS at 08:07

## 2024-01-07 RX ADMIN — CEFEPIME 2000 MG: 2 INJECTION, POWDER, FOR SOLUTION INTRAVENOUS at 00:19

## 2024-01-07 RX ADMIN — PANTOPRAZOLE SODIUM 40 MG: 40 INJECTION, POWDER, FOR SOLUTION INTRAVENOUS at 08:07

## 2024-01-07 RX ADMIN — ALBUTEROL SULFATE 2.5 MG: 2.5 SOLUTION RESPIRATORY (INHALATION) at 08:44

## 2024-01-07 RX ADMIN — ALBUTEROL SULFATE 2.5 MG: 2.5 SOLUTION RESPIRATORY (INHALATION) at 03:46

## 2024-01-07 RX ADMIN — ONDANSETRON 4 MG: 2 INJECTION INTRAMUSCULAR; INTRAVENOUS at 15:20

## 2024-01-07 RX ADMIN — ALBUTEROL SULFATE 2.5 MG: 2.5 SOLUTION RESPIRATORY (INHALATION) at 16:43

## 2024-01-07 RX ADMIN — MORPHINE SULFATE 0.5 MG: 2 INJECTION, SOLUTION INTRAMUSCULAR; INTRAVENOUS at 11:04

## 2024-01-07 RX ADMIN — SODIUM CHLORIDE, PRESERVATIVE FREE 10 ML: 5 INJECTION INTRAVENOUS at 23:15

## 2024-01-07 RX ADMIN — POTASSIUM CHLORIDE 10 MEQ: 7.46 INJECTION, SOLUTION INTRAVENOUS at 08:14

## 2024-01-07 RX ADMIN — POTASSIUM CHLORIDE 10 MEQ: 7.46 INJECTION, SOLUTION INTRAVENOUS at 11:21

## 2024-01-07 RX ADMIN — ALBUTEROL SULFATE 2.5 MG: 2.5 SOLUTION RESPIRATORY (INHALATION) at 20:48

## 2024-01-07 RX ADMIN — ONDANSETRON 4 MG: 2 INJECTION INTRAMUSCULAR; INTRAVENOUS at 23:14

## 2024-01-07 RX ADMIN — ASCORBIC ACID, VITAMIN A PALMITATE, CHOLECALCIFEROL, THIAMINE HYDROCHLORIDE, RIBOFLAVIN-5 PHOSPHATE SODIUM, PYRIDOXINE HYDROCHLORIDE, NIACINAMIDE, DEXPANTHENOL, ALPHA-TOCOPHEROL ACETATE, VITAMIN K1, FOLIC ACID, BIOTIN, CYANOCOBALAMIN: 200; 3300; 200; 6; 3.6; 6; 40; 15; 10; 150; 600; 60; 5 INJECTION, SOLUTION INTRAVENOUS at 17:06

## 2024-01-07 ASSESSMENT — PAIN DESCRIPTION - DESCRIPTORS
DESCRIPTORS: ACHING
DESCRIPTORS: DISCOMFORT

## 2024-01-07 ASSESSMENT — PAIN DESCRIPTION - PAIN TYPE: TYPE: CHRONIC PAIN

## 2024-01-07 ASSESSMENT — PAIN SCALES - GENERAL
PAINLEVEL_OUTOF10: 0
PAINLEVEL_OUTOF10: 0
PAINLEVEL_OUTOF10: 6
PAINLEVEL_OUTOF10: 5
PAINLEVEL_OUTOF10: 0

## 2024-01-07 ASSESSMENT — PAIN DESCRIPTION - ORIENTATION
ORIENTATION: MID
ORIENTATION: MID

## 2024-01-07 ASSESSMENT — PAIN DESCRIPTION - ONSET: ONSET: ON-GOING

## 2024-01-07 ASSESSMENT — PAIN DESCRIPTION - LOCATION
LOCATION: ABDOMEN
LOCATION: BACK

## 2024-01-07 ASSESSMENT — PAIN DESCRIPTION - FREQUENCY: FREQUENCY: CONTINUOUS

## 2024-01-07 NOTE — PROGRESS NOTES
[facility] at [facility] to initiate [facility] process.

## 2024-01-07 NOTE — PROGRESS NOTES
Entered patient's room for afternoon head to toe reassessment. Patient resting in the bed at this time. A&O x4, calm, and cooperative. Patient denies of pain at this time. Head to toe reassessment completed at this time, see flowsheets for more details. Patient denies no more needs at this time. Call light within reach. Bed alarm on. Bed wheels locked. Bed in lowest position.

## 2024-01-07 NOTE — PROGRESS NOTES
Occupational Therapy  Facility/Department: [facility] ICU  Daily Treatment Note  NAME: Grace Gonzalez  : 1945  MRN: [id number]    Date of Service: 2024    Discharge Recommendations:  Continue to assess pending progress, Subacute/Skilled Nursing Facility         Patient Diagnosis(es): The primary encounter diagnosis was Small bowel obstruction (HCC). Diagnoses of Other specified intestinal obstruction, unspecified whether partial or complete (HCC) and Acute respiratory failure with hypoxia (HCC) were also pertinent to this visit.     Assessment    Activity Tolerance: Patient limited by fatigue;Patient limited by pain;Patient limited by endurance  Discharge Recommendations: Continue to assess pending progress;Subacute/Skilled Nursing Facility      Plan   Occupational Therapy Plan  Times Per Day: Once a day  Days Per Week: 7 Days  Current Treatment Recommendations: Strengthening;Balance training;Functional mobility training;Endurance training;Safety education & training;Patient/Caregiver education & training;Positioning;Self-Care / ADL;Home management training     Restrictions  Restrictions/Precautions  Restrictions/Precautions: NPO;General Precautions;Fall Risk;Up as Tolerated  Lower Extremity Weight Bearing Restrictions  Right Lower Extremity Weight Bearing: Weight Bearing As Tolerated    Subjective   Subjective  Subjective: Pt lying in bed upon arrival. Pt agreed to participate in bed exercises.  Pain: Pt had no complaints of pain.           Objective    Vitals              OT Exercises  Exercise Treatment: Pt completed BUE ther ex x 4 planes x 15 reps x 1 set to increase UE strength and endurance in order to ease completion of ADL tasks. Pt required RBs as needed secondary to fatigue.     Safety Devices  Type of Devices: All fall risk precautions in place;Call light within reach;Left in bed;Nurse notified     Patient Education  Education Given To: Patient  Education Provided: Role of Therapy;Plan of Care;Home

## 2024-01-07 NOTE — PROGRESS NOTES
GENERAL SURGERY PROGRESS NOTE- inpatient      PATIENT NAME: Grace Gonzalez     DATE: 1/7/2024    SUBJECTIVE:    Patient had a small bowel movement with suppository yesterday. Still feel distended. No nausea, vomiting. No flatus. NG tube output only 100 over 24 hours. Xray still shows dilated loops of small bowel on the left.     Medicine plan on transfer out for bronchoscopy due to left lung mucus plugging.      OBJECTIVE:   VITALS:  BP (!) 105/42   Pulse 83   Temp 97.5 °F (36.4 °C) (Temporal)   Resp 19   Ht 1.626 m (5' 4\")   Wt 47.5 kg (104 lb 12.8 oz)   SpO2 95%   BMI 17.99 kg/m²    height is 1.626 m (5' 4\") and weight is 47.5 kg (104 lb 12.8 oz). Her temporal temperature is 97.5 °F (36.4 °C). Her blood pressure is 105/42 (abnormal) and her pulse is 83. Her respiration is 19 and oxygen saturation is 95%.     INTAKE/OUTPUT:    I/O last 3 completed shifts:  In: 4599 [I.V.:4599]  Out: 5425 [Urine:5100; Emesis/NG output:325]  No intake/output data recorded.              CONSTITUTIONAL:  awake and alert  HEART:regular rate  ABDOMEN:   absent bowel sounds, soft, distended, diffuse mild-moderate tenderness. Incision c/d/I. No signs of infection   EXTREMITIES: no c/c/e      Data:  CBC:   Recent Labs     01/05/24  0518 01/06/24  0553 01/07/24  0520   WBC 11.4* 12.4* 11.6*   HGB 9.2* 10.4* 10.4*   HCT 27.9* 30.1* 29.9*    208 215     BMP:    Recent Labs     01/05/24  0518 01/06/24  0553 01/06/24  1600 01/07/24  0520    136  --  137   K 3.9 2.9* 3.5* 3.4*    99  --  98   CO2 25 29  --  30   BUN 6* 9  --  18   CREATININE 0.3* 0.4*  --  0.3*   GLUCOSE 298* 152*  --  127*     Hepatic:   Recent Labs     01/05/24  0518 01/06/24  0553 01/07/24  0520   AST 9 10 15   ALT 5 6 10   BILITOT 1.0 1.1 0.6   ALKPHOS 54 57 56         ASSESSMENT & PLAN:  POD 7 ex lap, KALYN for small bowel obstruction. Continued post-op ileus. Encouraging sign that NG tube output is slowing down tremendously. Still not reliable

## 2024-01-07 NOTE — PLAN OF CARE
Problem: Pain  Goal: Verbalizes/displays adequate comfort level or baseline comfort level  Outcome: Progressing  Flowsheets (Taken 1/6/2024 1930)  Verbalizes/displays adequate comfort level or baseline comfort level:   Encourage patient to monitor pain and request assistance   Administer analgesics based on type and severity of pain and evaluate response   Assess pain using appropriate pain scale   Implement non-pharmacological measures as appropriate and evaluate response   Notify Licensed Independent Practitioner if interventions unsuccessful or patient reports new pain  Note: Pt denies pain at this time. Pain meds given as needed & as ordered. Will continue to assess.     Problem: Safety - Adult  Goal: Free from fall injury  Outcome: Progressing  Flowsheets (Taken 1/6/2024 1930)  Free From Fall Injury: Instruct family/caregiver on patient safety  Note: Pt is at high risk for falls. Non skid socks on. Bed in low position and wheels locked. Fall sign posted and bracelet on. Siderails up x 2. Bed alarm on. Call light within reach. Will continue to assess.     Problem: Skin/Tissue Integrity  Goal: Absence of new skin breakdown  Description: 1.  Monitor for areas of redness and/or skin breakdown  2.  Assess vascular access sites hourly  3.  Every 4-6 hours minimum:  Change oxygen saturation probe site  4.  Every 4-6 hours:  If on nasal continuous positive airway pressure, respiratory therapy assess nares and determine need for appliance change or resting period.  Outcome: Progressing  Note: Skin assessed at regular intervals. Surgical incision to midline abdomen. Assistance with repositioning. Will continue to assess.     Problem: Respiratory - Adult  Goal: Achieves optimal ventilation and oxygenation  Outcome: Progressing  Flowsheets (Taken 1/6/2024 1930)  Achieves optimal ventilation and oxygenation:   Assess for changes in respiratory status   Position to facilitate oxygenation and minimize respiratory effort    Assess the need for suctioning and aspirate as needed   Respiratory therapy support as indicated   Assess for changes in mentation and behavior   Oxygen supplementation based on oxygen saturation or arterial blood gases   Encourage broncho-pulmonary hygiene including cough, deep breathe, incentive spirometry   Assess and instruct to report shortness of breath or any respiratory difficulty  Note: Pulse ox WNL. HHFNC in use. Lungs diminished. SOB noted with exertion. Pt denies any discomfort. Will continue to assess.     Problem: Cardiovascular - Adult  Goal: Maintains optimal cardiac output and hemodynamic stability  Outcome: Progressing  Flowsheets (Taken 1/6/2024 1930)  Maintains optimal cardiac output and hemodynamic stability: Monitor blood pressure and heart rate  Note: Cardiac monitor on. VS stable and WNL. Will continue to assess.     Problem: Skin/Tissue Integrity - Adult  Goal: Incisions, wounds, or drain sites healing without S/S of infection  Outcome: Progressing  Flowsheets (Taken 1/6/2024 1930)  Incisions, Wounds, or Drain Sites Healing Without Sign and Symptoms of Infection: TWICE DAILY: Assess and document skin integrity  Note: No temperature noted. Surgical site remains clean, dry and intact. No redness or drainage noted. VS stable and WNL. Labs monitored daily and as needed. Antibiotics given as ordered. Will continue to assess.

## 2024-01-07 NOTE — PROGRESS NOTES
RESPIRATORY ASSESSMENT PROTOCOL                                                                                              Patient Name: Grace Gonzalez Room#: I307/I307-01 : 1945     Admitting diagnosis: Small bowel obstruction (HCC) [K56.609]       Medical History:   Past Medical History:   Diagnosis Date    Arthritis     Asthma     Blood circulation, collateral     Cancer (HCC)     COPD (chronic obstructive pulmonary disease) (HCC)     HAP (hospital-acquired pneumonia) 2024    Pneumonia        PATIENT ASSESSMENT    LABORATORY DATA  Hematology:   Lab Results   Component Value Date/Time    WBC 11.6 2024 05:20 AM    RBC 3.37 2024 05:20 AM    HGB 10.4 2024 05:20 AM    HCT 29.9 2024 05:20 AM     2024 05:20 AM     Chemistry:    Lab Results   Component Value Date/Time    PHART 7.512 2024 11:49 AM    HAH0ZCC 32.4 2024 11:49 AM    PO2ART 39.2 2024 11:49 AM    A0CVNZGI 79.9 2024 11:49 AM    CWG2QZD 25.4 2024 11:49 AM    PBEA 3.0 2024 11:49 AM       VITALS  Pulse: 83   Respirations: 19  BP: (!) 105/42  SpO2: 95 % O2 Device: Heated high flow cannula  Temp: 97.5 °F (36.4 °C)    SKIN COLOR  [x] Normal  [] Pale  [] Dusky  [] Cyanotic    RESPIRATORY PATTERN  [x] Normal  [] Dyspnea  [] Cheyne-Esparza  [] Kussmaul  [] Biots    AMBULATORY  [] Yes  [x] No  [] With Assistance      Patient Acuity 0 1 2 3 4 Score   Level of Consciousness (LOC) [x]  Alert & Oriented or Pt normal LOC []  Confused;follows directions []  Confused & uncooper-ative []  Obtunded []  Comatose 0   Respiratory Rate  (RR) []  Reg. rate & pattern. 12 - 20 bpm  []  Increased RR. Greater than 20 bpm   [x]  SOB w/ exertion or RR greater than 24 bpm []  Access- ory muscle use at rest. Abn.  resp. []  SOB at rest.   2   Bilateral Breath Sounds (BBS) []  Clear []  Diminish-ed bases  [x]  Diminish-ed t/o, or rales   []  Sporadic, scattered wheezes or rhonchi []  Persistentwheezes

## 2024-01-07 NOTE — PROGRESS NOTES
[address] , [place], [place], [address]    Progress Note    Date:   1/7/2024  Patient name:  Grace Gonzalez  Date of admission:  12/30/2023  7:55 PM  MRN:   [id number]  YOB: 1945    SUBJECTIVE/Last 24 hours update:     Patient seen and examined at the bed side , no new acute events overnight except for an episode of hypoxia around the time of positioning for a CXR/imaging and noted to have some cardiac arrhythmias overnight. She was able to be weaned off the Levophed. No new complains except for ongoing abdominal discomfort and shortness of breath. She is not passing gas at this time. No n/v. NG to LIS. Notes from nursing staff and Consults had been reviewed, and the overnight progress had been checked with the nursing staff as well. Case/plan discussed in detail with the patient and family at the bedside, including my concerns for the mucous plug, possible outcomes, RBA of ongoing evaluation (e.g. worsening symptoms, hypoxia, death) vs. Transfer to tertiary center for likely bronch/possible intubation. Patient does not wish to have any emergent intubation and wishes to remain until re-evaluation overnight.     REVIEW OF SYSTEMS:      CONSTITUTIONAL:  no fevers, no headcahes  EYES: negative for blury vision  HEENT: No headaches, No nasal congestion, no difficulty swallowing  RESPIRATORY:positive for dyspnea, no wheezing, no Cough  CARDIOVASCULAR: negative for chest pain, no palpitations  GASTROINTESTINAL: no nausea, no vomiting, no change in bowel habits, positive for ongoing generalized abdominal pain   GENITOURINARY: negative for dysuria, no hematuria   MUSCULOSKELETAL: no joint pains, no muscle aches, no swelling of joints or extremities  NEUROLOGICAL: No  Weakness or numbness    PAST MEDICAL HISTORY:      has a past medical history of Arthritis, Asthma, Blood circulation, collateral, Cancer (HCC), COPD (chronic obstructive pulmonary disease) (HCC), HAP  hospital problems. *      PLAN:     Primary Problem(s): Small bowel obstruction (HCC)  Condition is at treatment goal  Treatment plan: Continue current treatment  Imaging: Xray Chest ordered, KUB; ECHO pending  Medications: Cefepime Abx  Medication Monitoring / High Risk Medications: none   GS is following closely  IV Protonix  NG to LIS, NPO  Monitor labs closely  K replacement, Mg replacement as indicated  Net post 1.5L, may consider further diuresis with Lasix  Pain control as indicated  Supplemental O2 as needed/HF for now  Breathing treatments to be continued  Monitor telemetry/EKG to be obtained as indicated  PT/OT  Prognosis remains guarded at this time  Dispo pending    DVT prophylaxis:  Lovenox  GI prophylaxis:  PPI    Above plan discussed with the patient who agreed to the above plan     Discussed care plan with nurse after getting their input.    Critical Care Time:  Total critical care time caring for this patient with life threatening, unstable organ failure, including direct patient contact, management of life support systems, review of data including imaging and labs, discussions with other team members and physicians at least 35 minutes so far today, excluding separately billable procedures.    Please note that this chart was generated using voice recognition Dragon dictation software. Although every effort was made to ensure the accuracy of this automated transcription, some errors in transcription may have occurred.    Otto Dominguez MD  1/7/2024 8:01 AM

## 2024-01-07 NOTE — PROGRESS NOTES
Entered patient's room for late morning head to toe reassessment. Patient resting in the bed at this time with daughter at the bedside. A&O x4, calm, and cooperative. Patient complains of pain at this time. Head to toe reassessment completed at this time, see flowsheets for more details. Bilateral lung sounds continue to be diminished and patient only breathing with shallow breaths. Patient denies no more needs at this time. Call light within reach. Bed alarm on. Bed wheels locked. Bed in lowest position.

## 2024-01-07 NOTE — PROGRESS NOTES
Pt resting in bed with eyes closed. Respirations even and unlabored. No distress noted. Pt wakes easily to verbal stimuli. Assessment and vital as charted. Pt denies pain at this time. Calderon catheter remains in place. HHFNC continues to be in use.  IV medications infusing as ordered. Pt denies any other needs. Call light in reach. Bed alarm on. Will continue to monitor.

## 2024-01-07 NOTE — PROGRESS NOTES
NG tube residual checked at this time, no residual noted. Patient tolerating clamped NG tube well.

## 2024-01-07 NOTE — PROGRESS NOTES
Pt resting in bed awake with daughter at bedside. Assessment and vital signs as charted. Pt denies pain needs at this time. Cardiac monitor shows NSR. NG tube intact and hooked to suction at WS. IV medications infusing as ordered. PICC line intact and working properly. Calderon catheter intact, draining and patent. HHFNC in place at 40L and 67% FiO2. Abdominal binder in pace with dressing to midline abdomen clean, dry and intact. Pt repositioned to left side with pillow support. Pt denies any other needs at this time. Call light in reach. Bed alarm on. Will continue to monitor.

## 2024-01-07 NOTE — PROGRESS NOTES
Spiritual Services Interventions  I307/I307-01   1/7/2024  Wendie MILES Contreras    Pt's mood is much more optimistic than what it has been. Pt makes many statements of wanting to aggressively participate in her care and saying \"God says it's not my time.\"  provided listening presence and words of encouragement.       Grace Gonzalez  78 y.o. year old female    Encounter Summary  Encounter Overview/Reason : (P) Spiritual/Emotional Needs  Service Provided For:: (P) Patient and family together  Referral/Consult From:: (P) Multi-disciplinary team  Support System: Children, Family members  Last Encounter : (P) 01/07/24  Complexity of Encounter: (P) Moderate  Begin Time: (P) 1140  End Time : (P) 1305  Total Time Calculated: (P) 85 min     Spiritual/Emotional needs  Type: (P) Spiritual Support     Grief, Loss, and Adjustments  Type: Adjustment to illness              Assessment/Intervention/Outcome  Assessment: (P) Calm  Intervention: (P) Active listening, Explored/Affirmed feelings, thoughts, concerns, Nurtured Hope, Prayer (assurance of)/Enterprise  Outcome: (P) Engaged in conversation, Expressed feelings, needs, and concerns, Optimistic, Receptive

## 2024-01-07 NOTE — PROGRESS NOTES
TPN Follow Up Note: Pharmacy Consult    Today's Labs:  Recent Labs     01/07/24  0520        Recent Labs     01/07/24  0520   CL 98     Recent Labs     01/07/24  0520   K 3.4*     Recent Labs     01/07/24  0520   MG 2.3     Recent Labs     01/07/24  0520   PHOS 3.3     Recent Labs     01/07/24  0520   GLUCOSE 127*     Recent Labs     01/07/24  0520   BUN 18         Assessment: Potassium level low @ 3.4  Electrolyte Replacement: KCL 40 meq IVPB today    TPN changes for (today) at 1800: Increase rate to 75 ml/hr    PN Product Dispensed: CLINIMIX-E  4.25/5   Lipid Product Dispensed: Intralipid 20%    Rates of Infusion   Lipid Infusion Rate: 7.6  ML/hr x 12 hours   Clinimix-E Infusion Rate: 75 mL/hr      Electrolytes (per bag) Clinimix E   Na= 70 mEq/Bag (2000ml)   K= 60 mEq/Bag (2000ml)              Mg= 10 mEq/Bag (2000ml)              Ca= 4.4 mmol/Bag (2000ml)              Acetate= 140 mEq/Bag (2000ml)              Chloride= 78 mEq/Bag (2000ml)              Phosphate= 30mMol/Bag (2000ml)                            MVI= 10 mL/Bag 2000ml   Trace Elements= 1 mL/Bag (2000ml)  **NOTE: Electrolytes are based upon the total volume of the TPN bag (2000ml).  The amount of electrolytes received is based upon the rate/volume of the total infusion.    Thank you,  Skye Coronado, PharmD, 1/7/2024 10:52 AM

## 2024-01-07 NOTE — PROGRESS NOTES
PN review and recommendations.  Labs reviewed.   Recommend advancing PN to 75 ml/hr Clinimix 4.25/5% with IL 20% 7.6 ml/hr x 12 hrs to provide 794 calories, 76.5 g AA in 1891 ml volume.    Elpidio Guan, TARANN ,LD

## 2024-01-07 NOTE — PROGRESS NOTES
Transfer from [facility]   started out 12/30 with abd pain, SBO went for ex lap LA but not progressing well, has resolving ileus NG tube, more sob- developing pneumonia on ATB Cefepeme, BP dropped had to put on Levo-was on for 3 days, off since midnight. chest xray shows large effusion- Stable opacification of the left hemithorax with mediastinal shift to the left. Improved right basilar infiltrate. cat scan shows mucus plug went from 4L to hi virgilio now, needs bronched, WBC 11.6 on TPN K was 2.9 but today 3.4 EKG no acute findings NSR no trop drawn, Hgb 10.4 Hx COPD breast ca 105/42 83 19 36.4 FiO2 95 40/40 95% Dr Young adm, currently no beds,discharge dependent

## 2024-01-07 NOTE — PROGRESS NOTES
Writer placed new large HF cannula for comfort. Nose is raw inside with  scabbed outside edges of nares & nasal septum. Very tender. Repositioned HF cannula under NG tube, writer cut off left cannula and  placed it under NT tube for comfort. Right outside cannula was lubricated  with water soluble jelly, also used jelly on scabbed areas. Placed foam ear supports for comfort, straps were making her ears sore. Patient feels comfortable with cannula now.

## 2024-01-07 NOTE — PROGRESS NOTES
Entered patient's room for morning head to toe assessment. Patient resting in the bed at this time. A&O x4, calm, and cooperative. Patient denies of pain at this time. Head to toe assessment completed at this time, see flowsheets for more details. PICC line flushed at this time. Potassium protocol started at this time. Patient denies no more needs at this time. Call light within reach. Bed alarm on. Bed wheels locked. Bed in lowest position. Family at the bedside.

## 2024-01-07 NOTE — PROGRESS NOTES
Physical Therapy  Facility/Department: [facility] ICU  Daily Treatment Note  NAME: Grace Gonzalez  : 1945  MRN: [id number]    Date of Service: 2024    Discharge Recommendations:  Continue to assess pending progress, Subacute/Skilled Nursing Facility     Patient Diagnosis(es): The primary encounter diagnosis was Small bowel obstruction (HCC). Diagnoses of Other specified intestinal obstruction, unspecified whether partial or complete (HCC) and Acute respiratory failure with hypoxia (HCC) were also pertinent to this visit.    Assessment   Assessment: Pt agreeable to supine exercises only today. Supine therex BLE x10-15 reps in all available planes. Pt feeling better today with improved spirits.  Activity Tolerance: Patient limited by fatigue;Patient limited by pain;Patient limited by endurance     Plan    Physical Therapy Plan  General Plan: 2 times a day 7 days a week  Specific Instructions for Next Treatment: 1x/daily on weekends and holidays     Restrictions  Restrictions/Precautions  Restrictions/Precautions: NPO, General Precautions, Fall Risk, Up as Tolerated  Required Braces or Orthoses?: No  Lower Extremity Weight Bearing Restrictions  Right Lower Extremity Weight Bearing: Weight Bearing As Tolerated     Subjective    Subjective  Subjective: Pt in bed upon arrival, agreeable to supine exercises only  Pain: increased abdominal pain with movement but does not rate  Orientation  Overall Orientation Status: Within Functional Limits  Cognition  Overall Cognitive Status: WFL     Objective  Bed Mobility Training  Bed Mobility Training: No  Transfer Training  Transfer Training: No  Gait Training  Gait Training: No  PT Exercises  Exercise Treatment: Supine therex BLE x10-15 reps in all available planes, occassional rest breaks t/o due to stomach pains  Safety Devices  Type of Devices: All fall risk precautions in place;Call light within reach;Left in bed;Nurse notified     Goals  Short Term Goals  Time Frame for  Short Term Goals: 20 days  Short Term Goal 1: Pt will be educated on her POC  Short Term Goal 2: Pt will perform all transfers SBA in order to increase independence  Short Term Goal 3: Pt will ambulate 100 feet with LRAD SBA in order to return home  Short Term Goal 4: Pt will tolerate 20 minutes of exercsies in order to improve strength  Patient Goals   Patient Goals : \"to get rid of pain\"    Education  Patient Education  Education Given To: Patient  Education Provided: Role of Therapy;Plan of Care  Education Method: Demonstration;Verbal  Barriers to Learning: None  Education Outcome: Verbalized understanding;Demonstrated understanding    Therapy Time   Individual Concurrent Group Co-treatment   Time In 1029         Time Out 1041         Minutes 12             Gladys Morales, MELISSA

## 2024-01-07 NOTE — PROGRESS NOTES
Pt resting in bed quietly with eyes closed. Respirations even and unlabored. No distress noted. Pt wakes easily to verbal stimuli. Assessment and vital signs as charted. Pt denies pain at this time. Calderon catheter intact, draining and patent. TPN infusing as ordered. Labs drawn from PICC line at this time. Pt is A & O x 4. Pt denies any other needs. Call light in reach. Bed alarm on. Will continue to monitor.

## 2024-01-07 NOTE — PROGRESS NOTES
Writer made Dr. Dominguez aware of HR dropping to 44, levophed is off and pt had a run of trigeminy/bigeminy. HR is back to NSR at 70's. No new orders. Will continue to monitor.

## 2024-01-07 NOTE — PROGRESS NOTES
Had in increase FIO2 on HHFNC to 95% +15L oxymask to recover after doing deep breathing maneuvers.

## 2024-01-08 ENCOUNTER — APPOINTMENT (OUTPATIENT)
Age: 79
DRG: 335 | End: 2024-01-08
Attending: STUDENT IN AN ORGANIZED HEALTH CARE EDUCATION/TRAINING PROGRAM
Payer: MEDICARE

## 2024-01-08 LAB
ALBUMIN SERPL-MCNC: 2.5 G/DL (ref 3.5–5.2)
ALBUMIN/GLOB SERPL: 0.9 {RATIO} (ref 1–2.5)
ALP SERPL-CCNC: 59 U/L (ref 35–104)
ALT SERPL-CCNC: 16 U/L (ref 5–33)
ANION GAP SERPL CALCULATED.3IONS-SCNC: 7 MMOL/L (ref 9–17)
AST SERPL-CCNC: 21 U/L
BASOPHILS # BLD: 0.03 K/UL (ref 0–0.2)
BASOPHILS NFR BLD: 0 % (ref 0–2)
BILIRUB DIRECT SERPL-MCNC: <0.1 MG/DL
BILIRUB INDIRECT SERPL-MCNC: ABNORMAL MG/DL (ref 0–1)
BILIRUB SERPL-MCNC: 0.5 MG/DL (ref 0.3–1.2)
BUN SERPL-MCNC: 21 MG/DL (ref 8–23)
BUN/CREAT SERPL: 70 (ref 9–20)
CALCIUM SERPL-MCNC: 8.4 MG/DL (ref 8.6–10.4)
CHLORIDE SERPL-SCNC: 101 MMOL/L (ref 98–107)
CO2 SERPL-SCNC: 26 MMOL/L (ref 20–31)
CREAT SERPL-MCNC: 0.3 MG/DL (ref 0.5–0.9)
ECHO AO ROOT DIAM: 2.8 CM
ECHO AO ROOT INDEX: 1.88 CM/M2
ECHO AV ACCELERATION TIME: 53.87 MS
ECHO AV CUSP MM: 1.8 CM
ECHO AV MEAN GRADIENT: 4 MMHG
ECHO AV MEAN VELOCITY: 1 M/S
ECHO AV PEAK VELOCITY: 1.5 M/S
ECHO AV VTI: 23.4 CM
ECHO BSA: 1.47 M2
ECHO EST RA PRESSURE: 8 MMHG
ECHO LA DIAMETER INDEX: 2.01 CM/M2
ECHO LA DIAMETER: 3 CM
ECHO LA MAJOR AXIS: 4.3 CM
ECHO LA TO AORTIC ROOT RATIO: 1.07
ECHO LA VOL BP: 33 ML (ref 22–52)
ECHO LA VOL MOD A2C: 40 ML (ref 22–52)
ECHO LA VOL MOD A4C: 24 ML (ref 22–52)
ECHO LA VOL/BSA BIPLANE: 22 ML/M2 (ref 16–34)
ECHO LA VOLUME AREA LENGTH: 37 ML
ECHO LA VOLUME INDEX AREA LENGTH: 25 ML/M2 (ref 16–34)
ECHO LA VOLUME INDEX MOD A2C: 27 ML/M2 (ref 16–34)
ECHO LA VOLUME INDEX MOD A4C: 16 ML/M2 (ref 16–34)
ECHO LV E' LATERAL VELOCITY: 12 CM/S
ECHO LV EDV A2C: 53 ML
ECHO LV EDV A4C: 25 ML
ECHO LV EDV BP: 40 ML (ref 56–104)
ECHO LV EDV INDEX A4C: 17 ML/M2
ECHO LV EDV INDEX BP: 27 ML/M2
ECHO LV EDV NDEX A2C: 36 ML/M2
ECHO LV EJECTION FRACTION BIPLANE: 71 % (ref 55–100)
ECHO LV ESV A2C: 16 ML
ECHO LV ESV A4C: 8 ML
ECHO LV ESV BP: 11 ML (ref 19–49)
ECHO LV ESV INDEX A2C: 11 ML/M2
ECHO LV ESV INDEX A4C: 5 ML/M2
ECHO LV ESV INDEX BP: 7 ML/M2
ECHO LV FRACTIONAL SHORTENING: 31 % (ref 28–44)
ECHO LV INTERNAL DIMENSION DIASTOLE INDEX: 2.62 CM/M2
ECHO LV INTERNAL DIMENSION DIASTOLIC: 3.9 CM (ref 3.9–5.3)
ECHO LV INTERNAL DIMENSION SYSTOLIC INDEX: 1.81 CM/M2
ECHO LV INTERNAL DIMENSION SYSTOLIC: 2.7 CM
ECHO LV IVSD: 0.9 CM (ref 0.6–0.9)
ECHO LV IVSS: 1.1 CM
ECHO LV MASS 2D: 113.6 G (ref 67–162)
ECHO LV MASS INDEX 2D: 76.2 G/M2 (ref 43–95)
ECHO LV POSTERIOR WALL DIASTOLIC: 1 CM (ref 0.6–0.9)
ECHO LV POSTERIOR WALL SYSTOLIC: 1.2 CM
ECHO LV RELATIVE WALL THICKNESS RATIO: 0.51
ECHO MV A VELOCITY: 0.52 M/S
ECHO MV E DECELERATION TIME (DT): 382.8 MS
ECHO MV E VELOCITY: 0.53 M/S
ECHO MV E/A RATIO: 1.02
ECHO MV E/E' LATERAL: 4.42
ECHO PV MAX VELOCITY: 0.9 M/S
ECHO RIGHT VENTRICULAR SYSTOLIC PRESSURE (RVSP): 37 MMHG
ECHO TV REGURGITANT MAX VELOCITY: 2.7 M/S
EOSINOPHIL # BLD: 0.06 K/UL (ref 0–0.44)
EOSINOPHILS RELATIVE PERCENT: 0 % (ref 1–4)
ERYTHROCYTE [DISTWIDTH] IN BLOOD BY AUTOMATED COUNT: 13.4 % (ref 11.8–14.4)
GFR SERPL CREATININE-BSD FRML MDRD: >60 ML/MIN/1.73M2
GLUCOSE BLD-MCNC: 105 MG/DL (ref 74–100)
GLUCOSE BLD-MCNC: 162 MG/DL (ref 74–100)
GLUCOSE BLD-MCNC: 164 MG/DL (ref 74–100)
GLUCOSE BLD-MCNC: 96 MG/DL (ref 74–100)
GLUCOSE SERPL-MCNC: 105 MG/DL (ref 70–99)
HCT VFR BLD AUTO: 29.4 % (ref 36.3–47.1)
HGB BLD-MCNC: 9.8 G/DL (ref 11.9–15.1)
IMM GRANULOCYTES # BLD AUTO: 0.19 K/UL (ref 0–0.3)
IMM GRANULOCYTES NFR BLD: 1 %
LYMPHOCYTES NFR BLD: 2.37 K/UL (ref 1.1–3.7)
LYMPHOCYTES RELATIVE PERCENT: 15 % (ref 24–43)
MAGNESIUM SERPL-MCNC: 2.2 MG/DL (ref 1.6–2.6)
MCH RBC QN AUTO: 30.3 PG (ref 25.2–33.5)
MCHC RBC AUTO-ENTMCNC: 33.3 G/DL (ref 28.4–34.8)
MCV RBC AUTO: 91 FL (ref 82.6–102.9)
MICROORGANISM SPEC CULT: NORMAL
MONOCYTES NFR BLD: 1.37 K/UL (ref 0.1–1.2)
MONOCYTES NFR BLD: 9 % (ref 3–12)
NEUTROPHILS NFR BLD: 75 % (ref 36–65)
NEUTS SEG NFR BLD: 11.74 K/UL (ref 1.5–8.1)
NRBC BLD-RTO: 0 PER 100 WBC
PHOSPHATE SERPL-MCNC: 3.5 MG/DL (ref 2.6–4.5)
PLATELET # BLD AUTO: 229 K/UL (ref 138–453)
PMV BLD AUTO: 9.5 FL (ref 8.1–13.5)
POTASSIUM SERPL-SCNC: 4.1 MMOL/L (ref 3.7–5.3)
PROT SERPL-MCNC: 5.4 G/DL (ref 6.4–8.3)
RBC # BLD AUTO: 3.23 M/UL (ref 3.95–5.11)
SERVICE CMNT-IMP: NORMAL
SODIUM SERPL-SCNC: 134 MMOL/L (ref 135–144)
SPECIMEN DESCRIPTION: NORMAL
WBC OTHER # BLD: 15.8 K/UL (ref 3.5–11.3)

## 2024-01-08 PROCEDURE — 83735 ASSAY OF MAGNESIUM: CPT

## 2024-01-08 PROCEDURE — 94669 MECHANICAL CHEST WALL OSCILL: CPT

## 2024-01-08 PROCEDURE — 82947 ASSAY GLUCOSE BLOOD QUANT: CPT

## 2024-01-08 PROCEDURE — 80076 HEPATIC FUNCTION PANEL: CPT

## 2024-01-08 PROCEDURE — 80048 BASIC METABOLIC PNL TOTAL CA: CPT

## 2024-01-08 PROCEDURE — 93306 TTE W/DOPPLER COMPLETE: CPT

## 2024-01-08 PROCEDURE — 97116 GAIT TRAINING THERAPY: CPT

## 2024-01-08 PROCEDURE — 2000000000 HC ICU R&B

## 2024-01-08 PROCEDURE — 85025 COMPLETE CBC W/AUTO DIFF WBC: CPT

## 2024-01-08 PROCEDURE — 93306 TTE W/DOPPLER COMPLETE: CPT | Performed by: INTERNAL MEDICINE

## 2024-01-08 PROCEDURE — 97110 THERAPEUTIC EXERCISES: CPT

## 2024-01-08 PROCEDURE — 2580000003 HC RX 258: Performed by: NURSE PRACTITIONER

## 2024-01-08 PROCEDURE — 36415 COLL VENOUS BLD VENIPUNCTURE: CPT

## 2024-01-08 PROCEDURE — 94640 AIRWAY INHALATION TREATMENT: CPT

## 2024-01-08 PROCEDURE — A4216 STERILE WATER/SALINE, 10 ML: HCPCS | Performed by: NURSE PRACTITIONER

## 2024-01-08 PROCEDURE — 2700000000 HC OXYGEN THERAPY PER DAY

## 2024-01-08 PROCEDURE — 2500000003 HC RX 250 WO HCPCS: Performed by: NURSE PRACTITIONER

## 2024-01-08 PROCEDURE — 6360000002 HC RX W HCPCS: Performed by: NURSE PRACTITIONER

## 2024-01-08 PROCEDURE — 94761 N-INVAS EAR/PLS OXIMETRY MLT: CPT

## 2024-01-08 PROCEDURE — 94664 DEMO&/EVAL PT USE INHALER: CPT

## 2024-01-08 PROCEDURE — 2580000003 HC RX 258: Performed by: STUDENT IN AN ORGANIZED HEALTH CARE EDUCATION/TRAINING PROGRAM

## 2024-01-08 PROCEDURE — 84100 ASSAY OF PHOSPHORUS: CPT

## 2024-01-08 PROCEDURE — 6360000002 HC RX W HCPCS: Performed by: STUDENT IN AN ORGANIZED HEALTH CARE EDUCATION/TRAINING PROGRAM

## 2024-01-08 PROCEDURE — C9113 INJ PANTOPRAZOLE SODIUM, VIA: HCPCS | Performed by: NURSE PRACTITIONER

## 2024-01-08 RX ADMIN — MORPHINE SULFATE 0.5 MG: 2 INJECTION, SOLUTION INTRAMUSCULAR; INTRAVENOUS at 09:08

## 2024-01-08 RX ADMIN — SODIUM CHLORIDE, PRESERVATIVE FREE 10 ML: 5 INJECTION INTRAVENOUS at 09:01

## 2024-01-08 RX ADMIN — ALBUTEROL SULFATE 2.5 MG: 2.5 SOLUTION RESPIRATORY (INHALATION) at 12:00

## 2024-01-08 RX ADMIN — ALBUTEROL SULFATE 2.5 MG: 2.5 SOLUTION RESPIRATORY (INHALATION) at 20:11

## 2024-01-08 RX ADMIN — ALBUTEROL SULFATE 2.5 MG: 2.5 SOLUTION RESPIRATORY (INHALATION) at 00:14

## 2024-01-08 RX ADMIN — ONDANSETRON 4 MG: 2 INJECTION INTRAMUSCULAR; INTRAVENOUS at 18:14

## 2024-01-08 RX ADMIN — PANTOPRAZOLE SODIUM 40 MG: 40 INJECTION, POWDER, FOR SOLUTION INTRAVENOUS at 09:00

## 2024-01-08 RX ADMIN — CEFEPIME 2000 MG: 2 INJECTION, POWDER, FOR SOLUTION INTRAVENOUS at 11:49

## 2024-01-08 RX ADMIN — ONDANSETRON 4 MG: 2 INJECTION INTRAMUSCULAR; INTRAVENOUS at 09:08

## 2024-01-08 RX ADMIN — ALBUTEROL SULFATE 2.5 MG: 2.5 SOLUTION RESPIRATORY (INHALATION) at 16:43

## 2024-01-08 RX ADMIN — ALBUTEROL SULFATE 2.5 MG: 2.5 SOLUTION RESPIRATORY (INHALATION) at 07:54

## 2024-01-08 RX ADMIN — METHYLPREDNISOLONE SODIUM SUCCINATE 40 MG: 40 INJECTION INTRAMUSCULAR; INTRAVENOUS at 09:01

## 2024-01-08 RX ADMIN — OLIVE OIL AND SOYBEAN OIL 14.6 ML/HR: 16; 4 INJECTION, EMULSION INTRAVENOUS at 17:18

## 2024-01-08 RX ADMIN — SODIUM CHLORIDE, PRESERVATIVE FREE 10 ML: 5 INJECTION INTRAVENOUS at 20:27

## 2024-01-08 RX ADMIN — ASCORBIC ACID, VITAMIN A PALMITATE, CHOLECALCIFEROL, THIAMINE HYDROCHLORIDE, RIBOFLAVIN-5 PHOSPHATE SODIUM, PYRIDOXINE HYDROCHLORIDE, NIACINAMIDE, DEXPANTHENOL, ALPHA-TOCOPHEROL ACETATE, VITAMIN K1, FOLIC ACID, BIOTIN, CYANOCOBALAMIN: 200; 3300; 200; 6; 3.6; 6; 40; 15; 10; 150; 600; 60; 5 INJECTION, SOLUTION INTRAVENOUS at 17:17

## 2024-01-08 RX ADMIN — ALBUTEROL SULFATE 2.5 MG: 2.5 SOLUTION RESPIRATORY (INHALATION) at 04:10

## 2024-01-08 RX ADMIN — SODIUM CHLORIDE, PRESERVATIVE FREE 10 ML: 5 INJECTION INTRAVENOUS at 20:26

## 2024-01-08 RX ADMIN — ENOXAPARIN SODIUM 30 MG: 100 INJECTION SUBCUTANEOUS at 09:00

## 2024-01-08 RX ADMIN — MORPHINE SULFATE 0.5 MG: 2 INJECTION, SOLUTION INTRAMUSCULAR; INTRAVENOUS at 18:14

## 2024-01-08 ASSESSMENT — PAIN DESCRIPTION - PAIN TYPE
TYPE: SURGICAL PAIN

## 2024-01-08 ASSESSMENT — PAIN SCALES - GENERAL
PAINLEVEL_OUTOF10: 6
PAINLEVEL_OUTOF10: 0
PAINLEVEL_OUTOF10: 6
PAINLEVEL_OUTOF10: 6
PAINLEVEL_OUTOF10: 2

## 2024-01-08 ASSESSMENT — PAIN DESCRIPTION - LOCATION
LOCATION: ABDOMEN

## 2024-01-08 ASSESSMENT — PAIN DESCRIPTION - DESCRIPTORS
DESCRIPTORS: ACHING;DISCOMFORT
DESCRIPTORS: ACHING;DISCOMFORT
DESCRIPTORS: ACHING
DESCRIPTORS: ACHING

## 2024-01-08 ASSESSMENT — PAIN DESCRIPTION - ONSET
ONSET: ON-GOING

## 2024-01-08 ASSESSMENT — PAIN - FUNCTIONAL ASSESSMENT
PAIN_FUNCTIONAL_ASSESSMENT: ACTIVITIES ARE NOT PREVENTED

## 2024-01-08 ASSESSMENT — PAIN DESCRIPTION - FREQUENCY
FREQUENCY: CONTINUOUS
FREQUENCY: CONTINUOUS
FREQUENCY: INTERMITTENT
FREQUENCY: INTERMITTENT

## 2024-01-08 ASSESSMENT — PAIN DESCRIPTION - ORIENTATION
ORIENTATION: MID

## 2024-01-08 NOTE — PROGRESS NOTES
Patient returned to bed at this time with minimal assistance. Repositioned abdominal binder and changed ABD pad to midline incision. Family remains at bedside.

## 2024-01-08 NOTE — PLAN OF CARE
Problem: Discharge Planning  Goal: Discharge to home or other facility with appropriate resources  Outcome: Progressing     Problem: Pain  Goal: Verbalizes/displays adequate comfort level or baseline comfort level  Outcome: Progressing     Problem: Safety - Adult  Goal: Free from fall injury  Outcome: Progressing     Problem: Skin/Tissue Integrity  Goal: Absence of new skin breakdown  Description: 1.  Monitor for areas of redness and/or skin breakdown  2.  Assess vascular access sites hourly  3.  Every 4-6 hours minimum:  Change oxygen saturation probe site  4.  Every 4-6 hours:  If on nasal continuous positive airway pressure, respiratory therapy assess nares and determine need for appliance change or resting period.  Outcome: Progressing     Problem: Respiratory - Adult  Goal: Achieves optimal ventilation and oxygenation  Outcome: Progressing     Problem: Cardiovascular - Adult  Goal: Maintains optimal cardiac output and hemodynamic stability  Outcome: Progressing     Problem: Skin/Tissue Integrity - Adult  Goal: Skin integrity remains intact  Outcome: Progressing  Goal: Incisions, wounds, or drain sites healing without S/S of infection  Outcome: Progressing  Goal: Oral mucous membranes remain intact  Outcome: Progressing     Problem: Musculoskeletal - Adult  Goal: Return mobility to safest level of function  Outcome: Progressing     Problem: Gastrointestinal - Adult  Goal: Minimal or absence of nausea and vomiting  Outcome: Progressing  Goal: Maintains or returns to baseline bowel function  Outcome: Progressing  Goal: Maintains adequate nutritional intake  Outcome: Progressing  Goal: Establish and maintain optimal ostomy function  Outcome: Progressing     Problem: Infection - Adult  Goal: Absence of infection at discharge  Outcome: Progressing     Problem: Nutrition Deficit:  Goal: Optimize nutritional status  1/8/2024 1747 by Joie Hernandez, RN  Outcome: Progressing  1/8/2024 0823 by Elpidio Guan RD,  LD  Outcome: Progressing  Flowsheets (Taken 1/8/2024 0369)  Nutrient intake appropriate for improving, restoring, or maintaining nutritional needs:   Recommend, monitor, and adjust tube feedings and TPN/PPN based on assessed needs   Monitor oral intake, labs, and treatment plans  Note: Nutrition Problem #1: Severe malnutrition  Intervention: Food and/or Nutrient Delivery: Modify Parenteral Nutrition       Problem: ABCDS Injury Assessment  Goal: Absence of physical injury  Outcome: Progressing

## 2024-01-08 NOTE — PLAN OF CARE
Problem: Discharge Planning  Goal: Discharge to home or other facility with appropriate resources  Outcome: Progressing  Flowsheets (Taken 1/4/2024 2345 by Ade Murray, RN)  Discharge to home or other facility with appropriate resources: Identify barriers to discharge with patient and caregiver     Problem: Pain  Goal: Verbalizes/displays adequate comfort level or baseline comfort level  Outcome: Progressing  Flowsheets (Taken 1/6/2024 1930 by Jeannie Cruz, RN)  Verbalizes/displays adequate comfort level or baseline comfort level:   Encourage patient to monitor pain and request assistance   Administer analgesics based on type and severity of pain and evaluate response   Assess pain using appropriate pain scale   Implement non-pharmacological measures as appropriate and evaluate response   Notify Licensed Independent Practitioner if interventions unsuccessful or patient reports new pain     Problem: Safety - Adult  Goal: Free from fall injury  Outcome: Progressing  Flowsheets (Taken 1/6/2024 1930 by Jeannie Cruz RN)  Free From Fall Injury: Instruct family/caregiver on patient safety     Problem: Skin/Tissue Integrity  Goal: Absence of new skin breakdown  Description: 1.  Monitor for areas of redness and/or skin breakdown  2.  Assess vascular access sites hourly  3.  Every 4-6 hours minimum:  Change oxygen saturation probe site  4.  Every 4-6 hours:  If on nasal continuous positive airway pressure, respiratory therapy assess nares and determine need for appliance change or resting period.  Outcome: Progressing     Problem: Respiratory - Adult  Goal: Achieves optimal ventilation and oxygenation  Outcome: Progressing  Flowsheets (Taken 1/6/2024 1930 by Jeannie Cruz RN)  Achieves optimal ventilation and oxygenation:   Assess for changes in respiratory status   Position to facilitate oxygenation and minimize respiratory effort   Assess the need for suctioning and aspirate as needed   Respiratory  adequate hydration   Maintain NPO status until nausea and vomiting are resolved   Nasogastric tube to low intermittent suction as ordered   Administer ordered antiemetic medications as needed     Problem: Gastrointestinal - Adult  Goal: Maintains or returns to baseline bowel function  Outcome: Progressing  Flowsheets (Taken 1/2/2024 1945 by Megan Jimenez, RN)  Maintains or returns to baseline bowel function: Assess bowel function     Problem: Gastrointestinal - Adult  Goal: Maintains adequate nutritional intake  Outcome: Progressing  Flowsheets (Taken 1/2/2024 1945 by Megan Jimenez, RN)  Maintains adequate nutritional intake: Monitor percentage of each meal consumed     Problem: Gastrointestinal - Adult  Goal: Establish and maintain optimal ostomy function  Outcome: Progressing  Flowsheets (Taken 1/7/2024 2009)  Establish and maintain optimal ostomy function:   Infuse IV Fluids/TPN as ordered   Gastric suctioning as ordered     Problem: Infection - Adult  Goal: Absence of infection at discharge  Outcome: Progressing  Flowsheets (Taken 1/7/2024 2009)  Absence of infection at discharge:   Assess and monitor for signs and symptoms of infection   Monitor lab/diagnostic results   Monitor all insertion sites i.e., indwelling lines, tubes and drains     Problem: ABCDS Injury Assessment  Goal: Absence of physical injury  Outcome: Progressing  Flowsheets (Taken 1/5/2024 0725 by Lizz Burrows, RN)  Absence of Physical Injury: Implement safety measures based on patient assessment

## 2024-01-08 NOTE — RT PROTOCOL NOTE
RESPIRATORY ASSESSMENT PROTOCOL                                                                                              Patient Name: Grace Gonzalez Room#: I307/I307-01 : 1945     Admitting diagnosis: Small bowel obstruction (HCC) [K56.609]       Medical History:   Past Medical History:   Diagnosis Date    Arthritis     Asthma     Blood circulation, collateral     Cancer (HCC)     COPD (chronic obstructive pulmonary disease) (HCC)     HAP (hospital-acquired pneumonia) 2024    Pneumonia        PATIENT ASSESSMENT    LABORATORY DATA  Hematology:   Lab Results   Component Value Date/Time    WBC 15.8 2024 05:20 AM    RBC 3.23 2024 05:20 AM    HGB 9.8 2024 05:20 AM    HCT 29.4 2024 05:20 AM     2024 05:20 AM     Chemistry:    Lab Results   Component Value Date/Time    PHART 7.512 2024 11:49 AM    ITD0KRL 32.4 2024 11:49 AM    PO2ART 39.2 2024 11:49 AM    S0FWJQIC 79.9 2024 11:49 AM    IPZ5NPN 25.4 2024 11:49 AM    PBEA 3.0 2024 11:49 AM       VITALS  Pulse: 66   Respirations: 29  BP: (!) 125/54  SpO2: 96 % O2 Device: Heated high flow cannula  Temp: 97.5 °F (36.4 °C)    SKIN COLOR  [x] Normal  [] Pale  [] Dusky  [] Cyanotic    RESPIRATORY PATTERN  [] Normal  [x] Dyspnea  [] Cheyne-Esparza  [] Kussmaul  [] Biots    AMBULATORY  [] Yes  [] No  [x] With Assistance            Patient Acuity 0 1 2 3 4 Score   Level of Consciousness (LOC) [x]  Alert & Oriented or Pt normal LOC []  Confused;follows directions []  Confused & uncooper-ative []  Obtunded []  Comatose 0   Respiratory Rate  (RR) []  Reg. rate & pattern. 12 - 20 bpm  []  Increased RR. Greater than 20 bpm   [x]  SOB w/ exertion or RR greater than 24 bpm []  Access- ory muscle use at rest. Abn.  resp. []  SOB at rest.   2   Bilateral Breath Sounds (BBS) []  Clear []  Diminish-ed bases  [x]  Diminish-ed t/o, or rales   []  Sporadic, scattered wheezes or rhonchi  deteriorates. MDI THERAPY with  2 actuations of [physician-ordered bronchodilator(s)] via spacer TID Albuterol and PRN q4 hrs.   If unable to utilize MDI: HHN [physician-ordered bronchodilator(s)] TID and Albuterol PRN q4 hrs.   Notify physician if condition deteriorates. MDI THERAPY with  [physician-ordered bronchodilator(s)] via spacer TID PRN.   If unable to utilize MDI: HHN [physician-ordered bronchodilator(s)] TID PRN.   Notify physician if condition deteriorates.         If Acuity Level is 2, 3, or 4 in any of the following:    [] COUGH     [] SURGICAL HISTORY (SURG HX)  [x] CHEST XRAY (CXR)    Goal: Improvement in sputum mobilization in patients with ineffective airway clearance. Reverse atelectasis.    [x] Bronchopulmonary Hygiene Protocol      Total Acuity:   14-28  [x]  Secondary Assessment in 24 hrs Total Acuity:  9-13  []  Secondary Assessment in 24 hrs Total Acuity:  4-8  []  Secondary Assessment in 24 hrs Total Acuity:  0-3  []  Secondary Assessment in 48 hrs   METANEB QID with [physician-ordered bronchodilator(s)] if CXR Acuity = 4; otherwise:  PD&P, Oscillatory Therapy, or Vest QID & PRN AND PEP QID & PRN  NT Sxn PRN for ineffective cough  METANEB QID with [physician-ordered bronchodilator(s)] if CXR Acuity = 4; otherwise:  PD&P, Oscillatory Therapy or Vest QID & PRN AND PEP QID & PRN  NT Sxn PRN for ineffective cough  PD&P, Oscillatory Therapy, or Vest TID & PRN AND PEP TID & PRN   Instruct patient to self-perform IS q1hr WA       If Acuity Level is 2 or above in the following:    [] PULMONARY HISTORY (PULM HX)    Goal: Assist patient in quitting smoking to slow or stop the progression of lung disease.    [] Smoking Cessation Protocol    SMOKING CESSATION EDUCATION provided according to policy RT_201: (tatiana with an X)  ____Yes    ____ No     ____ NA    Smoking Cessation Booklet given:  ____Yes  ____No ____Patient Refused

## 2024-01-08 NOTE — PROGRESS NOTES
Pt resting in bed with c/o of abd discomfort and nausea. Pt medicated per order. Daughter arrives to room and given an update regarding assessment and medications.  Informed that [facility] has not called yet. VS and assessment complete. Oral care provided. Bed alarm on and call light in reach.

## 2024-01-08 NOTE — PROGRESS NOTES
Increase Toprol XL 50mg to twice a day. Pt is resting in bed comfortably.  Pt denies any current pain or sob.  Pt states she feels comfortable.  COOPER and Yumiko assessed.  VS and assessment complete. Call light in reach and bed alarm on.

## 2024-01-08 NOTE — PROGRESS NOTES
Comprehensive Nutrition Assessment    Type and Reason for Visit:  Reassess    Nutrition Recommendations/Plan:   Advance PN to central formulation  Monitor glucose with increases in dextrose.      Malnutrition Assessment:  Malnutrition Status:  Severe malnutrition (01/02/24 0831)    Context:  Acute Illness     Findings of the 6 clinical characteristics of malnutrition:  Energy Intake:  50% or less of estimated energy requirements for 5 or more days  Weight Loss:  No significant weight loss     Body Fat Loss:  Moderate body fat loss Fat Overlying Ribs, Buccal region, Orbital   Muscle Mass Loss:  Moderate muscle mass loss Hand (interosseous), Clavicles (pectoralis & deltoids), Temples (temporalis)  Fluid Accumulation:  Moderate to Severe Extremities   Strength:  Not Performed    Nutrition Assessment:    Stable malnutrition on parenteral support.  Increases in PN yesterday to 75 ml/hr appearing tolerated with Mg++ and PO4  2.2 and 3.5 respectively. Mild glucose excursions on steroid and dextrose wtih 105-167 mg/dl in last 24 hours. Weight stable and remains with some edema. Albumin stable. Dilated bowel loops negating GI utilization still. Ice chips noted. Will recommend transition to full central PN at lower rate as to not over feed. 58.3 ml/hr Clinimix 5/15% to provide 1344 calories, 70 g AA in 1574 ml volume (33 ml/kg).  May have increases in serum glucose with transition to central PN. Noted potential transfer as well to tertiary facility for bronch.    Nutrition Related Findings:    absent RUQ/LUQ b/s, active RLQ, LLQ b/s. small hard bm. Trace BUE, BLE edema. Wound Type: Surgical Incision       Current Nutrition Intake & Therapies:    Average Meal Intake: NPO  Average Supplements Intake: NPO  Current Parenteral Nutrition Orders:  Type and Formula: Premix Peripheral   Lipids: Daily  Duration: Continuous  Rate/Volume: 75 ml/hr Clinimix 4.25/5% with 7.6 ml/hr  x12 hours  Current PN Order Provides: 794

## 2024-01-08 NOTE — PROGRESS NOTES
Pt with c/o right elbow pain post range of motion during lab draw from picc. Area assessed and elevated on a pillow.  Pt states she felt wet, a small area of urine noted on bed pad. Hygiene care provided and linens changed. Pt states she feels like she has small cuts in her mouth from \"glass.\" NP updated and notified of lido allergy. Oxygen decreased to %87 with activity on HHF. Pt is resting comfortably at this time. Call light in reach, bed alarm on and daughter at bedside.

## 2024-01-08 NOTE — PROGRESS NOTES
Grace is resting in the chair today. States that she is feeling \"ok.\" She is awaiting transfer to [facility]. States that she would rather stay here and she is tired of being such a problem. Support provided. Her daughter is not present at this time. We discuss Grace's spiritual needs. States that she raised her daughter at the [employer] but she does not believe in organized Yazdanism, \"if I need to talk to God I do and if I need to argue with him, I do that too.\" She shares about her family and grandson. After a short time she states that she is tired. Denies further needs at this time.     Kayli Allen RN  [facility]   Palliative Care Nurse Coordinator  1/8/2024 9:48 AM

## 2024-01-08 NOTE — PROGRESS NOTES
Patient did have drop in blood pressure with ambulation from chair to bed. Patient requesting pain medication at this time. Informed patient and daughter that this nurse would like to see patient's blood pressure return to baseline before administering pain medication. Both verbalized understanding.

## 2024-01-08 NOTE — PROGRESS NOTES
Physical Therapy  Facility/Department: [facility] ICU  Daily Treatment Note  NAME: Grace Gonzalez  : 1945  MRN: [id number]    Date of Service: 2024    Discharge Recommendations:  Continue to assess pending progress, Subacute/Skilled Nursing Facility     Patient Diagnosis(es): The primary encounter diagnosis was Small bowel obstruction (HCC). Diagnoses of Other specified intestinal obstruction, unspecified whether partial or complete (HCC) and Acute respiratory failure with hypoxia (HCC) were also pertinent to this visit.    Assessment   Assessment: Pt. able to ambulate 8ftx1 with WW, CGA for safety with slow tenzin and decreased step length with forward flexed posture. Bed mobility:CGA/Min A. Transfers:CGA. Education on log rolling. Supine exercises B LE x20 with RB.  Activity Tolerance: Patient limited by fatigue;Patient limited by endurance;Patient tolerated treatment well     Plan    Physical Therapy Plan  General Plan: 2 times a day 7 days a week  Specific Instructions for Next Treatment: 1x/daily on weekends and holidays  Current Treatment Recommendations: Strengthening;Balance training;Gait training;Home exercise program;Therapeutic activities;Safety education & training;Neuromuscular re-education;Functional mobility training;Stair training;Transfer training;Patient/Caregiver education & training;Endurance training;Pain management     Restrictions  Restrictions/Precautions  Restrictions/Precautions: NPO, General Precautions, Fall Risk, Up as Tolerated  Required Braces or Orthoses?: No  Lower Extremity Weight Bearing Restrictions  Right Lower Extremity Weight Bearing: Weight Bearing As Tolerated     Subjective    Subjective  Subjective: Pt. in bed upon arrival, agreeable to therapy and just finished working with RT.  Pain: pain all over but does not rate  Orientation  Overall Orientation Status: Within Functional Limits     Objective   Bed Mobility Training  Bed Mobility Training: Yes  Overall Level of  Assistance: Contact-guard assistance;Minimum assistance;Assist X1  Interventions: Safety awareness training;Verbal cues  Rolling: Contact-guard assistance;Assist X1  Supine to Sit: Contact-guard assistance;Minimum assistance;Assist X1  Scooting: Contact-guard assistance;Assist X1  Transfer Training  Transfer Training: Yes  Overall Level of Assistance: Contact-guard assistance;Assist X1;Additional time  Interventions: Safety awareness training;Verbal cues  Sit to Stand: Contact-guard assistance;Assist X1;Additional time  Stand to Sit: Contact-guard assistance;Assist X1;Additional time  Gait Training  Gait Training: Yes  Gait  Overall Level of Assistance: Contact-guard assistance;Assist X1;Additional time  Distance (ft): 8 Feet  Assistive Device: Walker, rolling;Gait belt  Interventions: Safety awareness training;Verbal cues  Base of Support: Narrowed  Speed/Tiffany: Slow  Step Length: Right lengthened;Left shortened  PT Exercises  Exercise Treatment: supine exercises B Le x20 with RB d/t fatigue.  Safety Devices  Type of Devices: All fall risk precautions in place;Call light within reach;Nurse notified;Left in chair       Goals  Short Term Goals  Time Frame for Short Term Goals: 20 days  Short Term Goal 1: Pt will be educated on her POC  Short Term Goal 2: Pt will perform all transfers SBA in order to increase independence  Short Term Goal 3: Pt will ambulate 100 feet with LRAD SBA in order to return home  Short Term Goal 4: Pt will tolerate 20 minutes of exercsies in order to improve strength  Patient Goals   Patient Goals : \"to get rid of pain\"    Education  Patient Education  Education Given To: Patient  Education Provided: Role of Therapy;Plan of Care;Transfer Training  Education Provided Comments: log roll  Education Method: Demonstration;Verbal  Barriers to Learning: None  Education Outcome: Verbalized understanding;Demonstrated understanding      Therapy Time   Individual Concurrent Group Co-treatment   Time

## 2024-01-08 NOTE — PROGRESS NOTES
I met again with the patient as well as her daughter and grandson.  Patient is currently resting in bed on high flow oxygen with 40 L with 56% FiO2.  Patient is coughing up thick white sputum.  Patient is hypotensive with systolic blood pressure of 82 however no tachycardia.  An SpO2 was 94%.  I did discuss my concerns with her related to collapse of the left lung and need of bronchoscopy and or thoracentesis due to large effusion seen as well and seen on echocardiogram.  I explained the concerns that I had regarding worsening respiratory failure, cardiac arrhythmias including death.  Patient is currently a DNR CC arrest.  I did discuss options with her which included transfer to another tertiary center including [facility].  They discussed [facility].  Patient does not wish to go to [facility] at this time as her mother passed away up there and [place] has reservation.  At this time patient wishes to remain here however I explained to her if her condition were to worsen that we would have to negotiate transfer to another tertiary center sooner rather than later.  At this time patient wishes to stay here and will discuss again in the morning upon me making rounds.    Mai Holly, APRN - CNP

## 2024-01-08 NOTE — PROGRESS NOTES
TPN Follow Up Note: Pharmacy Consult    Today's Labs:  Recent Labs     01/08/24  0520   *     Recent Labs     01/08/24  0520        Recent Labs     01/08/24  0520   K 4.1     Recent Labs     01/08/24  0520   MG 2.2     Recent Labs     01/08/24  0520   PHOS 3.5     Recent Labs     01/08/24  0520   GLUCOSE 105*     Recent Labs     01/08/24  0520   BUN 21         Assessment: electrolytes stable today  Electrolyte Replacement: no replacements at this time    TPN changes for (today) at 1800: change to CENTRAL line Clinimix 5/15 at rate of 58.3mL/hr    PN Product Dispensed: CLINIMIX-E 5/15   Lipid Product Dispensed: Intralipid 20%    Rates of Infusion   Lipid Infusion Rate: 58.3  mL/hr CENTRAL LINE   Clinimix-E Infusion Rate: 14.6 mL/hr x 12 hr          Electrolytes (per bag) Clinimix E   Na= 70 mEq/Bag (2000ml)   K= 60 mEq/Bag (2000ml)              Mg= 10 mEq/Bag (2000ml)              Ca= 4.4 mmol/Bag (2000ml)              Acetate= 160 mEq/Bag (2000ml)  (140mEq/Bag-Peripheral Formula)              Chloride= 78 mEq/Bag (2000ml)              Phosphate= 30mMol/Bag (2000ml)                            MVI= 10 mL/Bag 2000ml   Trace Elements= 1 mL/Bag (2000ml)  **NOTE: Electrolytes are based upon the total volume of the TPN bag (2000ml).  The amount of electrolytes received is based upon the rate/volume of the total infusion.      Re-check BMP, Mg, PO4, iCa with next labs    Dana Cruz [facility],1/8/2024, 12:36 PM

## 2024-01-08 NOTE — PROGRESS NOTES
INTENSIVE CARE UNIT   APRN - Progress Note    Patient - Grace Gonzalez  Date of Admission -  12/30/2023  7:55 PM  Date of Evaluation -  1/8/2024  Hospital Day - 9      SUBJECTIVE:     The Grace Gonzalez is a 78 y.o. female who is seen for follow up in the ICU.  Per nursing report and notes, overnight events include:  None .  She resting in bed with daughter at bedside. Continues on heated high flow.  NG remains clamped.  No nausea or vomiting reported.  No flatus or BM.  Awaiting transfer to [facility].  Needs bronchoscopy      ROS:   Constitutional: negative  for fevers, and negative for chills.  Respiratory: positive for shortness of breath, positive for cough, and negative for wheezing  Cardiovascular: negative for chest pain, and negative for palpitations  Gastrointestinal: positive for abdominal pain, negative for nausea,negative for vomiting, negative for diarrhea, and negative for constipation    All other systems were reviewed with the patient and are negative unless otherwise stated in HPI.      OBJECTIVE:     VITAL SIGNS:  Patient Vitals for the past 8 hrs:   BP Temp Temp src Pulse Resp SpO2 Weight   01/08/24 0700 (!) 98/48 -- -- 66 25 95 % --   01/08/24 0600 (!) 105/51 -- -- 69 (!) 33 90 % --   01/08/24 0500 (!) 107/54 -- -- 62 25 95 % --   01/08/24 0426 (!) 104/48 -- -- 68 23 (!) 89 % 47.6 kg (105 lb 0.1 oz)   01/08/24 0411 -- -- -- -- 28 94 % --   01/08/24 0400 (!) 115/48 -- -- 68 (!) 32 93 % --   01/08/24 0330 (!) 116/48 -- -- 68 (!) 32 92 % --   01/08/24 0300 (!) 99/43 97.2 °F (36.2 °C) Temporal 67 (!) 33 95 % --   01/08/24 0230 (!) 111/45 -- -- 70 23 95 % --   01/08/24 0200 (!) 104/48 -- -- 69 27 96 % 47.6 kg (105 lb 0.1 oz)   01/08/24 0130 (!) 114/43 -- -- 66 23 98 % --   01/08/24 0100 (!) 103/50 -- -- 62 24 96 % --   01/08/24 0030 (!) 95/37 -- -- 69 22 95 % --   01/08/24 0015 -- -- -- 67 24 95 % --   01/08/24 0000 (!) 108/53 -- -- 66 26 96 % --   01/07/24 2344 -- -- -- -- 24 -- --  Additional Contrast? None   Final Result   Small-bowel obstruction with possible transition point in the right lower   pelvis      Age indeterminate L1 inferior endplate compression deformity.      Cholelithiasis.      RECOMMENDATIONS:   If there is focal tenderness, recommend MRI of the lumbar spine for further   evaluation.             CT Chest 1/6/2024          Chest X-Ray 1/7/2024        ASSESSMENT / PLAN:     Primary Problem(s): Small bowel obstruction (HCC)  Differential diagnoses: Ileus, obstruction  Condition is an undiagnosed new problem with uncertain prognosis  Condition is stable  Treatment plan:   Appreciate general surgery  Postop-exploratory laparotomy with lysis of adhesions  NG clamped  Diet per general surgery  Up to chair 3 times daily  Ambulate  PICC line in place  Imaging: no further imaging studies ordered today  2 view abdomen on 1/4-no change in small bowel distention.  Medications:   Continue TPN\  Stop IV Zosyn  Continue cefepime  IV morphine  IV fentanyl  IV Protonix  Dulcolax daily as needed  Medication Monitoring / High Risk Medications: none     Hospital acquired pneumonia  Condition is stable  Treatment plan:   Appreciate pulmonology  Would benefit from BiPAP/NIV but due to abdominal surgery and NG there is risk of aspiration so need to avoid this  High risk for intubation  Continue bronchodilators with DuoNeb  Avoid Solu-Medrol  Venous Dopplers  Appreciate respiratory therapy  Incentive spirometer  Up to chair  Blood cultures x 2-pending  Imaging:   CT chest 1/6/2024-moderate large pleural effusion with complete collapse of the left upper and lower lobe.  Small right pleural effusion.  Chest x-ray-now blunting CP angle pneumonia/possible reactive pleural effusion developing  CXR ordered-pneumonia  Medications:   Continue nebs  Stop IV Zosyn  Continue cefepime  Stop Levophed      Acute respiratory failure with hypoxia  Condition is improving  Treatment plan:   Appreciate

## 2024-01-08 NOTE — PROGRESS NOTES
Patient up to chair with therapy this morning and tolerated fairly well. Patient did desat with ambulation. FIO2 increased on HHFNC to 61%. NG residual checked at this time with 0cc noted. FSBS 96. Daughter went home for a little while. Palliative care nurse Amina at bedside. Patient c/o of some nausea and pain. Medicated per PRN orders. See MAR.

## 2024-01-09 ENCOUNTER — APPOINTMENT (OUTPATIENT)
Dept: GENERAL RADIOLOGY | Age: 79
DRG: 335 | End: 2024-01-09
Payer: MEDICARE

## 2024-01-09 ENCOUNTER — HOSPITAL ENCOUNTER (INPATIENT)
Age: 79
LOS: 3 days | Discharge: ANOTHER HOSPITAL | End: 2024-01-12
Attending: INTERNAL MEDICINE | Admitting: INTERNAL MEDICINE
Payer: MEDICARE

## 2024-01-09 VITALS
RESPIRATION RATE: 25 BRPM | WEIGHT: 104.7 LBS | DIASTOLIC BLOOD PRESSURE: 50 MMHG | HEART RATE: 71 BPM | HEIGHT: 64 IN | OXYGEN SATURATION: 97 % | SYSTOLIC BLOOD PRESSURE: 90 MMHG | TEMPERATURE: 97.9 F | BODY MASS INDEX: 17.87 KG/M2

## 2024-01-09 PROBLEM — J96.00 ACUTE RESPIRATORY FAILURE (HCC): Status: ACTIVE | Noted: 2024-01-09

## 2024-01-09 LAB
ANION GAP SERPL CALCULATED.3IONS-SCNC: 8 MMOL/L (ref 9–17)
BASOPHILS # BLD: 0.04 K/UL (ref 0–0.2)
BASOPHILS NFR BLD: 0 % (ref 0–2)
BUN SERPL-MCNC: 23 MG/DL (ref 8–23)
BUN/CREAT SERPL: 58 (ref 9–20)
CALCIUM SERPL-MCNC: 8.2 MG/DL (ref 8.6–10.4)
CHLORIDE SERPL-SCNC: 100 MMOL/L (ref 98–107)
CO2 SERPL-SCNC: 25 MMOL/L (ref 20–31)
CREAT SERPL-MCNC: 0.4 MG/DL (ref 0.5–0.9)
EOSINOPHIL # BLD: 0.07 K/UL (ref 0–0.44)
EOSINOPHILS RELATIVE PERCENT: 1 % (ref 1–4)
ERYTHROCYTE [DISTWIDTH] IN BLOOD BY AUTOMATED COUNT: 13.6 % (ref 11.8–14.4)
GFR SERPL CREATININE-BSD FRML MDRD: >60 ML/MIN/1.73M2
GLUCOSE BLD-MCNC: 118 MG/DL (ref 74–100)
GLUCOSE BLD-MCNC: 165 MG/DL (ref 74–100)
GLUCOSE SERPL-MCNC: 112 MG/DL (ref 70–99)
HCT VFR BLD AUTO: 30.4 % (ref 36.3–47.1)
HGB BLD-MCNC: 10.2 G/DL (ref 11.9–15.1)
IMM GRANULOCYTES # BLD AUTO: 0.33 K/UL (ref 0–0.3)
IMM GRANULOCYTES NFR BLD: 3 %
LYMPHOCYTES NFR BLD: 2.61 K/UL (ref 1.1–3.7)
LYMPHOCYTES RELATIVE PERCENT: 20 % (ref 24–43)
MAGNESIUM SERPL-MCNC: 2.2 MG/DL (ref 1.6–2.6)
MCH RBC QN AUTO: 30.3 PG (ref 25.2–33.5)
MCHC RBC AUTO-ENTMCNC: 33.6 G/DL (ref 28.4–34.8)
MCV RBC AUTO: 90.2 FL (ref 82.6–102.9)
MICROORGANISM SPEC CULT: NORMAL
MONOCYTES NFR BLD: 1.2 K/UL (ref 0.1–1.2)
MONOCYTES NFR BLD: 9 % (ref 3–12)
NEUTROPHILS NFR BLD: 67 % (ref 36–65)
NEUTS SEG NFR BLD: 8.88 K/UL (ref 1.5–8.1)
NRBC BLD-RTO: 0 PER 100 WBC
PATH REV BLD -IMP: NORMAL
PHOSPHATE SERPL-MCNC: 3.1 MG/DL (ref 2.6–4.5)
PLATELET # BLD AUTO: 227 K/UL (ref 138–453)
PMV BLD AUTO: 9.2 FL (ref 8.1–13.5)
POTASSIUM SERPL-SCNC: 3.9 MMOL/L (ref 3.7–5.3)
RBC # BLD AUTO: 3.37 M/UL (ref 3.95–5.11)
SERVICE CMNT-IMP: NORMAL
SODIUM SERPL-SCNC: 133 MMOL/L (ref 135–144)
SPECIMEN DESCRIPTION: NORMAL
SURGICAL PATHOLOGY REPORT: NORMAL
WBC OTHER # BLD: 13.1 K/UL (ref 3.5–11.3)

## 2024-01-09 PROCEDURE — 6360000002 HC RX W HCPCS: Performed by: STUDENT IN AN ORGANIZED HEALTH CARE EDUCATION/TRAINING PROGRAM

## 2024-01-09 PROCEDURE — 2580000003 HC RX 258: Performed by: STUDENT IN AN ORGANIZED HEALTH CARE EDUCATION/TRAINING PROGRAM

## 2024-01-09 PROCEDURE — 84100 ASSAY OF PHOSPHORUS: CPT

## 2024-01-09 PROCEDURE — 6360000002 HC RX W HCPCS: Performed by: NURSE PRACTITIONER

## 2024-01-09 PROCEDURE — 87070 CULTURE OTHR SPECIMN AEROBIC: CPT

## 2024-01-09 PROCEDURE — 71046 X-RAY EXAM CHEST 2 VIEWS: CPT

## 2024-01-09 PROCEDURE — 97530 THERAPEUTIC ACTIVITIES: CPT

## 2024-01-09 PROCEDURE — 6360000002 HC RX W HCPCS: Performed by: PHYSICIAN ASSISTANT

## 2024-01-09 PROCEDURE — 82947 ASSAY GLUCOSE BLOOD QUANT: CPT

## 2024-01-09 PROCEDURE — 94664 DEMO&/EVAL PT USE INHALER: CPT

## 2024-01-09 PROCEDURE — 80048 BASIC METABOLIC PNL TOTAL CA: CPT

## 2024-01-09 PROCEDURE — 2060000000 HC ICU INTERMEDIATE R&B

## 2024-01-09 PROCEDURE — 2580000003 HC RX 258: Performed by: NURSE PRACTITIONER

## 2024-01-09 PROCEDURE — 36592 COLLECT BLOOD FROM PICC: CPT

## 2024-01-09 PROCEDURE — 2700000000 HC OXYGEN THERAPY PER DAY

## 2024-01-09 PROCEDURE — 3E0336Z INTRODUCTION OF NUTRITIONAL SUBSTANCE INTO PERIPHERAL VEIN, PERCUTANEOUS APPROACH: ICD-10-PCS | Performed by: INTERNAL MEDICINE

## 2024-01-09 PROCEDURE — A4216 STERILE WATER/SALINE, 10 ML: HCPCS | Performed by: NURSE PRACTITIONER

## 2024-01-09 PROCEDURE — 71045 X-RAY EXAM CHEST 1 VIEW: CPT

## 2024-01-09 PROCEDURE — 94761 N-INVAS EAR/PLS OXIMETRY MLT: CPT

## 2024-01-09 PROCEDURE — 99223 1ST HOSP IP/OBS HIGH 75: CPT | Performed by: PHYSICIAN ASSISTANT

## 2024-01-09 PROCEDURE — 94640 AIRWAY INHALATION TREATMENT: CPT

## 2024-01-09 PROCEDURE — 51702 INSERT TEMP BLADDER CATH: CPT

## 2024-01-09 PROCEDURE — 85025 COMPLETE CBC W/AUTO DIFF WBC: CPT

## 2024-01-09 PROCEDURE — 97535 SELF CARE MNGMENT TRAINING: CPT

## 2024-01-09 PROCEDURE — 83735 ASSAY OF MAGNESIUM: CPT

## 2024-01-09 PROCEDURE — C9113 INJ PANTOPRAZOLE SODIUM, VIA: HCPCS | Performed by: NURSE PRACTITIONER

## 2024-01-09 PROCEDURE — 2500000003 HC RX 250 WO HCPCS: Performed by: NURSE PRACTITIONER

## 2024-01-09 PROCEDURE — 2580000003 HC RX 258: Performed by: PHYSICIAN ASSISTANT

## 2024-01-09 PROCEDURE — 87641 MR-STAPH DNA AMP PROBE: CPT

## 2024-01-09 PROCEDURE — 97110 THERAPEUTIC EXERCISES: CPT

## 2024-01-09 PROCEDURE — 94669 MECHANICAL CHEST WALL OSCILL: CPT

## 2024-01-09 RX ORDER — MORPHINE SULFATE 2 MG/ML
2 INJECTION, SOLUTION INTRAMUSCULAR; INTRAVENOUS
Status: DISCONTINUED | OUTPATIENT
Start: 2024-01-09 | End: 2024-01-12 | Stop reason: HOSPADM

## 2024-01-09 RX ORDER — POLYETHYLENE GLYCOL 3350 17 G/17G
17 POWDER, FOR SOLUTION ORAL DAILY PRN
Status: DISCONTINUED | OUTPATIENT
Start: 2024-01-09 | End: 2024-01-12 | Stop reason: HOSPADM

## 2024-01-09 RX ORDER — ACETAMINOPHEN 650 MG/1
650 SUPPOSITORY RECTAL EVERY 6 HOURS PRN
Status: DISCONTINUED | OUTPATIENT
Start: 2024-01-09 | End: 2024-01-12 | Stop reason: HOSPADM

## 2024-01-09 RX ORDER — POTASSIUM CHLORIDE 20 MEQ/1
40 TABLET, EXTENDED RELEASE ORAL PRN
Status: DISCONTINUED | OUTPATIENT
Start: 2024-01-09 | End: 2024-01-12 | Stop reason: HOSPADM

## 2024-01-09 RX ORDER — ENOXAPARIN SODIUM 100 MG/ML
30 INJECTION SUBCUTANEOUS DAILY
Status: DISCONTINUED | OUTPATIENT
Start: 2024-01-10 | End: 2024-01-12 | Stop reason: HOSPADM

## 2024-01-09 RX ORDER — MORPHINE SULFATE 2 MG/ML
1 INJECTION, SOLUTION INTRAMUSCULAR; INTRAVENOUS
Status: DISCONTINUED | OUTPATIENT
Start: 2024-01-09 | End: 2024-01-12 | Stop reason: HOSPADM

## 2024-01-09 RX ORDER — MAGNESIUM SULFATE IN WATER 40 MG/ML
2000 INJECTION, SOLUTION INTRAVENOUS PRN
Status: DISCONTINUED | OUTPATIENT
Start: 2024-01-09 | End: 2024-01-12 | Stop reason: HOSPADM

## 2024-01-09 RX ORDER — ONDANSETRON 2 MG/ML
4 INJECTION INTRAMUSCULAR; INTRAVENOUS EVERY 6 HOURS PRN
Status: DISCONTINUED | OUTPATIENT
Start: 2024-01-09 | End: 2024-01-12 | Stop reason: HOSPADM

## 2024-01-09 RX ORDER — SODIUM CHLORIDE 9 MG/ML
INJECTION, SOLUTION INTRAVENOUS PRN
Status: DISCONTINUED | OUTPATIENT
Start: 2024-01-09 | End: 2024-01-12 | Stop reason: HOSPADM

## 2024-01-09 RX ORDER — ALBUTEROL SULFATE 90 UG/1
2 AEROSOL, METERED RESPIRATORY (INHALATION) EVERY 4 HOURS PRN
Status: DISCONTINUED | OUTPATIENT
Start: 2024-01-09 | End: 2024-01-10

## 2024-01-09 RX ORDER — POTASSIUM CHLORIDE 7.45 MG/ML
10 INJECTION INTRAVENOUS PRN
Status: DISCONTINUED | OUTPATIENT
Start: 2024-01-09 | End: 2024-01-12 | Stop reason: HOSPADM

## 2024-01-09 RX ORDER — CALCITONIN SALMON 200 [IU]/.09ML
1 SPRAY, METERED NASAL DAILY
Status: DISCONTINUED | OUTPATIENT
Start: 2024-01-10 | End: 2024-01-12 | Stop reason: HOSPADM

## 2024-01-09 RX ORDER — MIDODRINE HYDROCHLORIDE 5 MG/1
5 TABLET ORAL
Status: DISCONTINUED | OUTPATIENT
Start: 2024-01-09 | End: 2024-01-09 | Stop reason: HOSPADM

## 2024-01-09 RX ORDER — SODIUM CHLORIDE 0.9 % (FLUSH) 0.9 %
5-40 SYRINGE (ML) INJECTION EVERY 12 HOURS SCHEDULED
Status: DISCONTINUED | OUTPATIENT
Start: 2024-01-09 | End: 2024-01-12 | Stop reason: HOSPADM

## 2024-01-09 RX ORDER — ONDANSETRON 4 MG/1
4 TABLET, ORALLY DISINTEGRATING ORAL EVERY 8 HOURS PRN
Status: DISCONTINUED | OUTPATIENT
Start: 2024-01-09 | End: 2024-01-12 | Stop reason: HOSPADM

## 2024-01-09 RX ORDER — ACETAMINOPHEN 325 MG/1
650 TABLET ORAL EVERY 6 HOURS PRN
Status: DISCONTINUED | OUTPATIENT
Start: 2024-01-09 | End: 2024-01-12 | Stop reason: HOSPADM

## 2024-01-09 RX ORDER — SODIUM CHLORIDE 0.9 % (FLUSH) 0.9 %
5-40 SYRINGE (ML) INJECTION PRN
Status: DISCONTINUED | OUTPATIENT
Start: 2024-01-09 | End: 2024-01-12 | Stop reason: HOSPADM

## 2024-01-09 RX ADMIN — MORPHINE SULFATE 0.5 MG: 2 INJECTION, SOLUTION INTRAMUSCULAR; INTRAVENOUS at 14:51

## 2024-01-09 RX ADMIN — MORPHINE SULFATE 0.5 MG: 2 INJECTION, SOLUTION INTRAMUSCULAR; INTRAVENOUS at 05:25

## 2024-01-09 RX ADMIN — ALBUTEROL SULFATE 2.5 MG: 2.5 SOLUTION RESPIRATORY (INHALATION) at 09:35

## 2024-01-09 RX ADMIN — MORPHINE SULFATE 1 MG: 2 INJECTION, SOLUTION INTRAMUSCULAR; INTRAVENOUS at 20:52

## 2024-01-09 RX ADMIN — SODIUM CHLORIDE, PRESERVATIVE FREE 10 ML: 5 INJECTION INTRAVENOUS at 20:39

## 2024-01-09 RX ADMIN — ONDANSETRON 4 MG: 2 INJECTION INTRAMUSCULAR; INTRAVENOUS at 23:08

## 2024-01-09 RX ADMIN — ONDANSETRON 4 MG: 2 INJECTION INTRAMUSCULAR; INTRAVENOUS at 15:19

## 2024-01-09 RX ADMIN — ASCORBIC ACID, VITAMIN A PALMITATE, CHOLECALCIFEROL, THIAMINE HYDROCHLORIDE, RIBOFLAVIN-5 PHOSPHATE SODIUM, PYRIDOXINE HYDROCHLORIDE, NIACINAMIDE, DEXPANTHENOL, ALPHA-TOCOPHEROL ACETATE, VITAMIN K1, FOLIC ACID, BIOTIN, CYANOCOBALAMIN: 200; 3300; 200; 6; 3.6; 6; 40; 15; 10; 150; 600; 60; 5 INJECTION, SOLUTION INTRAVENOUS at 16:59

## 2024-01-09 RX ADMIN — MORPHINE SULFATE 0.5 MG: 2 INJECTION, SOLUTION INTRAMUSCULAR; INTRAVENOUS at 08:28

## 2024-01-09 RX ADMIN — CEFEPIME 2000 MG: 2 INJECTION, POWDER, FOR SOLUTION INTRAVENOUS at 00:15

## 2024-01-09 RX ADMIN — PANTOPRAZOLE SODIUM 40 MG: 40 INJECTION, POWDER, FOR SOLUTION INTRAVENOUS at 08:28

## 2024-01-09 RX ADMIN — SODIUM CHLORIDE, PRESERVATIVE FREE 10 ML: 5 INJECTION INTRAVENOUS at 08:28

## 2024-01-09 RX ADMIN — ALBUTEROL SULFATE 2.5 MG: 2.5 SOLUTION RESPIRATORY (INHALATION) at 04:28

## 2024-01-09 RX ADMIN — ENOXAPARIN SODIUM 30 MG: 100 INJECTION SUBCUTANEOUS at 08:27

## 2024-01-09 RX ADMIN — ALBUTEROL SULFATE 2.5 MG: 2.5 SOLUTION RESPIRATORY (INHALATION) at 14:10

## 2024-01-09 RX ADMIN — MORPHINE SULFATE 1 MG: 2 INJECTION, SOLUTION INTRAMUSCULAR; INTRAVENOUS at 23:08

## 2024-01-09 RX ADMIN — ONDANSETRON 4 MG: 2 INJECTION INTRAMUSCULAR; INTRAVENOUS at 08:28

## 2024-01-09 RX ADMIN — ALBUTEROL SULFATE 2.5 MG: 2.5 SOLUTION RESPIRATORY (INHALATION) at 00:23

## 2024-01-09 RX ADMIN — CEFEPIME 2000 MG: 2 INJECTION, POWDER, FOR SOLUTION INTRAVENOUS at 12:16

## 2024-01-09 RX ADMIN — OLIVE OIL AND SOYBEAN OIL 14.6 ML/HR: 16; 4 INJECTION, EMULSION INTRAVENOUS at 17:02

## 2024-01-09 RX ADMIN — METHYLPREDNISOLONE SODIUM SUCCINATE 40 MG: 40 INJECTION INTRAMUSCULAR; INTRAVENOUS at 08:28

## 2024-01-09 RX ADMIN — MORPHINE SULFATE 0.5 MG: 2 INJECTION, SOLUTION INTRAMUSCULAR; INTRAVENOUS at 00:27

## 2024-01-09 ASSESSMENT — PAIN SCALES - GENERAL
PAINLEVEL_OUTOF10: 4
PAINLEVEL_OUTOF10: 5
PAINLEVEL_OUTOF10: 6
PAINLEVEL_OUTOF10: 4
PAINLEVEL_OUTOF10: 2
PAINLEVEL_OUTOF10: 5
PAINLEVEL_OUTOF10: 5
PAINLEVEL_OUTOF10: 0
PAINLEVEL_OUTOF10: 5
PAINLEVEL_OUTOF10: 5
PAINLEVEL_OUTOF10: 6
PAINLEVEL_OUTOF10: 5
PAINLEVEL_OUTOF10: 6
PAINLEVEL_OUTOF10: 0

## 2024-01-09 ASSESSMENT — PAIN - FUNCTIONAL ASSESSMENT

## 2024-01-09 ASSESSMENT — PAIN DESCRIPTION - FREQUENCY
FREQUENCY: CONTINUOUS

## 2024-01-09 ASSESSMENT — PAIN DESCRIPTION - ONSET
ONSET: GRADUAL
ONSET: ON-GOING

## 2024-01-09 ASSESSMENT — PAIN DESCRIPTION - ORIENTATION
ORIENTATION: MID
ORIENTATION: MID
ORIENTATION: RIGHT;LEFT;MID
ORIENTATION: MID;LEFT;RIGHT
ORIENTATION: RIGHT;LEFT;MID
ORIENTATION: MID

## 2024-01-09 ASSESSMENT — ENCOUNTER SYMPTOMS
EYES NEGATIVE: 1
ALLERGIC/IMMUNOLOGIC NEGATIVE: 1
ABDOMINAL PAIN: 1
SHORTNESS OF BREATH: 1
COUGH: 0

## 2024-01-09 ASSESSMENT — PAIN SCALES - WONG BAKER
WONGBAKER_NUMERICALRESPONSE: 4

## 2024-01-09 ASSESSMENT — PAIN DESCRIPTION - LOCATION
LOCATION: ABDOMEN;BACK
LOCATION: ABDOMEN
LOCATION: BACK
LOCATION: ABDOMEN;BACK
LOCATION: ABDOMEN

## 2024-01-09 ASSESSMENT — PAIN DESCRIPTION - DESCRIPTORS
DESCRIPTORS: ACHING
DESCRIPTORS: ACHING;DISCOMFORT
DESCRIPTORS: ACHING
DESCRIPTORS: ACHING;DISCOMFORT
DESCRIPTORS: DISCOMFORT

## 2024-01-09 ASSESSMENT — PAIN DESCRIPTION - PAIN TYPE
TYPE: CHRONIC PAIN
TYPE: SURGICAL PAIN
TYPE: CHRONIC PAIN;SURGICAL PAIN
TYPE: SURGICAL PAIN
TYPE: SURGICAL PAIN;CHRONIC PAIN

## 2024-01-09 NOTE — PROGRESS NOTES
Received return call from Dr. Ac. Updated on patient status and history. Informed of consult. See new orders.

## 2024-01-09 NOTE — DISCHARGE SUMMARY
Discharge Summary    Grace Gonzalez  :  1945  MRN:  [id number]    Admit date:  2023      Discharge date: 2024     Admitting Physician:  Armand Longoria MD    Discharge Diagnoses:    Principal Problem:    Small bowel obstruction (HCC) likely adhesion related from prior  and hysterectomy  Active Problems:    Severe malnutrition (HCC)    COPD with hypoxia (HCC)    HAP (hospital-acquired pneumonia) / LLL /     Tobacco abuse disorder / long term smoker  Resolved Problems:    * No resolved hospital problems. *      Hospital Course:   Grace Gonzalez is a 78 y.o. female admitted with small bowel obstruction.  She presented with complaints of lower abdominal pain with associated vomiting.  She complained of constipation with 1 day onset of symptoms.  Patient stated her symptoms began during [place].  She stated she had approximately 4 days of abdominal pain.  Patient does have history of  as well as hysterectomy.  She does have history of breast cancer with mastectomy.  Patient denied any fever or chills.  She denied recent illness.  During patient's admission general surgery was consulted and patient was taken to surgery for lysis of adhesion and release of bowel obstruction.  Patient tolerated procedure fairly well however postoperatively patient developed hypoxia followed by hospital-acquired pneumonia.  Patient was placed on Zosyn initially and then transition to IV cefepime.  Patient required intensive care for continued respiratory failure and at 1 point was on 100% nonrebreather and intubation was discussed.  Pulmonology evaluated patient at that time.  Patient was encouraged coughing and deep breathing and increasing activity.  Respiratory assisted with percussion and other measures to assist with expectoration of mucus.  Patient's oxygen needs waxed and waned from nonrebreather to supplemental oxygen per nasal cannula and currently on heated high flow.  BiPAP was not  PROVIDED HISTORY: diffuse abdominal pain TECHNOLOGIST PROVIDED HISTORY: diffuse abdominal pain Decision Support Exception - unselect if not a suspected or confirmed emergency medical condition->Emergency Medical Condition (MA) FINDINGS: Lower chest: Visualized lungs are clear.  Left breast implant. Liver: ?Normal size and contour. No focal lesion. Gallbladder/biliary tree: Multiple gallstones.  No wall thickening or evidence for cholecystitis.. No biliary diliation. ?Adrenal glands: ?Unremarkable Spleen: ?Unremarkable Pancreas: ?unremarkable Kidneys: Unremarkable Bowel/GI tract: The stomach is markedly distended with fluid.  ? Diffusely dilated loops of small bowel measuring up to 3.6 cm in caliber with multiple air-fluid levels compatible with obstruction.  Possible transition point in the right lower pelvis as seen on image 102 series 2.  The large colon is normal in caliber.  Appendix is normal in caliber. ?Lymph Nodes: ?No enlarged adenopathy Bladder: ?Unremarkable Pelvis:  No pelvic masses. Unremarkable uterus Peritoneum/retroperitoneum: ? Small amount of pelvic ascites.  No free air. Vascular: ?Abdominal aorta is ectatic measuring up to 2.8 cm and tortuous with diffuse calcified plaque.  No aneurysm. Abdominal wall: ?Unremarkable ? Bones: Moderate lumbar dextroscoliosis.  Multilevel spondylosis.  Moderate L1 inferior endplate deformity of indeterminate age. Soft Tissues: ?Unremarkable     Small-bowel obstruction with possible transition point in the right lower pelvis Age indeterminate L1 inferior endplate compression deformity. Cholelithiasis. RECOMMENDATIONS: If there is focal tenderness, recommend MRI of the lumbar spine for further evaluation.       Assessment and Plan:  Patient Active Problem List    Diagnosis Date Noted    Acute respiratory failure (HCC) 01/09/2024    Tobacco abuse disorder / long term smoker 01/04/2024    HAP (hospital-acquired pneumonia) / LLL / 2024 01/03/2024    Severe malnutrition

## 2024-01-09 NOTE — PLAN OF CARE
Problem: Pain  Goal: Verbalizes/displays adequate comfort level or baseline comfort level  1/8/2024 1928 by Jeannie Cruz RN  Outcome: Progressing  Flowsheets (Taken 1/8/2024 1928)  Verbalizes/displays adequate comfort level or baseline comfort level:   Encourage patient to monitor pain and request assistance   Administer analgesics based on type and severity of pain and evaluate response   Assess pain using appropriate pain scale   Implement non-pharmacological measures as appropriate and evaluate response   Notify Licensed Independent Practitioner if interventions unsuccessful or patient reports new pain  Note: Pain meds given as needed & as ordered. Will continue to assess.     Problem: Safety - Adult  Goal: Free from fall injury  1/8/2024 1928 by Jeannie Cruz RN  Outcome: Progressing  Flowsheets (Taken 1/8/2024 1928)  Free From Fall Injury: Instruct family/caregiver on patient safety  Note: Pt is at high risk for falls. Non skid socks on. Bed in low position and wheels locked. Fall sign posted and bracelet on. Siderails up x 2. Bed alarm on. Call light within reach. Will continue to assess.     Problem: Skin/Tissue Integrity  Goal: Absence of new skin breakdown  Description: 1.  Monitor for areas of redness and/or skin breakdown  2.  Assess vascular access sites hourly  3.  Every 4-6 hours minimum:  Change oxygen saturation probe site  4.  Every 4-6 hours:  If on nasal continuous positive airway pressure, respiratory therapy assess nares and determine need for appliance change or resting period.  1/8/2024 1928 by Jeannie Cruz, RN  Outcome: Progressing  Note: Skin assessed at regular intervals. No open areas noted at this time. Assistance with repositioning. Will continue to assess.     Problem: Respiratory - Adult  Goal: Achieves optimal ventilation and oxygenation  1/8/2024 1928 by Jeannie Cruz, RN  Outcome: Progressing  Flowsheets (Taken 1/8/2024 1928)  Achieves optimal ventilation  and oxygenation:   Assess for changes in respiratory status   Position to facilitate oxygenation and minimize respiratory effort   Assess the need for suctioning and aspirate as needed   Respiratory therapy support as indicated   Assess for changes in mentation and behavior   Encourage broncho-pulmonary hygiene including cough, deep breathe, incentive spirometry   Oxygen supplementation based on oxygen saturation or arterial blood gases   Assess and instruct to report shortness of breath or any respiratory difficulty  Note: Pulse ox WNL. HHFNC in use. Lungs diminished. SOB noted with exertion. Pt denies any discomfort. Will continue to assess.     Problem: Cardiovascular - Adult  Goal: Maintains optimal cardiac output and hemodynamic stability  1/8/2024 1928 by Jeannie Cruz, RN  Outcome: Progressing  Flowsheets (Taken 1/8/2024 1928)  Maintains optimal cardiac output and hemodynamic stability: Monitor blood pressure and heart rate  Note: Cardiac monitor on. VS stable and WNL. Will continue to assess.

## 2024-01-09 NOTE — PROGRESS NOTES
Physical Therapy  Facility/Department: [facility] ICU  Daily Treatment Note  NAME: Grace Gonzalez  : 1945  MRN: [id number]    Date of Service: 2024    Discharge Recommendations:  Continue to assess pending progress, Subacute/Skilled Nursing Facility     Patient Diagnosis(es): The primary encounter diagnosis was Small bowel obstruction (HCC). Diagnoses of Other specified intestinal obstruction, unspecified whether partial or complete (HCC) and Acute respiratory failure with hypoxia (HCC) were also pertinent to this visit.    Assessment   Assessment: Pt. with low BP at this time, agreeable to standing weight shifts side to side and Fwd/back at WW. Transfers:CGa with additional time to complete. Seated exercises B LE x20 with RB d/t fatigue.  Activity Tolerance: Patient limited by fatigue;Patient limited by endurance     Plan    Physical Therapy Plan  General Plan: 2 times a day 7 days a week  Specific Instructions for Next Treatment: 1x/daily on weekends and holidays  Current Treatment Recommendations: Strengthening;Balance training;Gait training;Home exercise program;Therapeutic activities;Safety education & training;Neuromuscular re-education;Functional mobility training;Stair training;Transfer training;Patient/Caregiver education & training;Endurance training;Pain management     Restrictions  Restrictions/Precautions  Restrictions/Precautions: NPO, General Precautions, Fall Risk, Up as Tolerated  Required Braces or Orthoses?: No  Lower Extremity Weight Bearing Restrictions  Right Lower Extremity Weight Bearing: Weight Bearing As Tolerated     Subjective    Subjective  Subjective: Pt. up in chair with daughter present, agreeable to therapy at this time.  Pain: abdominal region, does not rate  Orientation  Overall Orientation Status: Within Functional Limits     Objective   Bed Mobility Training  Bed Mobility Training: No  Transfer Training  Transfer Training: Yes  Overall Level of Assistance: Contact-guard

## 2024-01-09 NOTE — PROGRESS NOTES
RESPIRATORY ASSESSMENT PROTOCOL                                                                                              Patient Name: Grace Gonzalez Room#: I307/I307-01 : 1945     Admitting diagnosis: Small bowel obstruction (HCC) [K56.609]       Medical History:   Past Medical History:   Diagnosis Date    Arthritis     Asthma     Blood circulation, collateral     Cancer (HCC)     COPD (chronic obstructive pulmonary disease) (HCC)     HAP (hospital-acquired pneumonia) 2024    Pneumonia        PATIENT ASSESSMENT    LABORATORY DATA  Hematology:   Lab Results   Component Value Date/Time    WBC 13.1 2024 05:20 AM    RBC 3.37 2024 05:20 AM    HGB 10.2 2024 05:20 AM    HCT 30.4 2024 05:20 AM     2024 05:20 AM     Chemistry:    Lab Results   Component Value Date/Time    PHART 7.512 2024 11:49 AM    DWN8VZA 32.4 2024 11:49 AM    PO2ART 39.2 2024 11:49 AM    T6OSNPRX 79.9 2024 11:49 AM    EHV6XTG 25.4 2024 11:49 AM    PBEA 3.0 2024 11:49 AM       VITALS  Pulse: 65   Respirations: 26  BP: (!) 110/53  SpO2: 91 % O2 Device: Heated high flow cannula  Temp: 97.9 °F (36.6 °C)    SKIN COLOR  [x] Normal  [] Pale  [] Dusky  [] Cyanotic    RESPIRATORY PATTERN  [] Normal  [x] Dyspnea  [] Cheyne-Esparza  [] Kussmaul  [] Biots    AMBULATORY  [] Yes  [] No  [x] With Assistance    Patient Acuity 0 1 2 3 4 Score   Level of Consciousness (LOC) [x]  Alert & Oriented or Pt normal LOC []  Confused;follows directions []  Confused & uncooper-ative []  Obtunded []  Comatose 0   Respiratory Rate  (RR) []  Reg. rate & pattern. 12 - 20 bpm  []  Increased RR. Greater than 20 bpm   [x]  SOB w/ exertion or RR greater than 24 bpm []  Access- ory muscle use at rest. Abn.  resp. []  SOB at rest.   2   Bilateral Breath Sounds (BBS) []  Clear []  Diminish-ed bases  [x]  Diminish-ed t/o, or rales   []  Sporadic, scattered wheezes or rhonchi []  Persistentwheezes and,  actuations of [physician-ordered bronchodilator(s)] via spacer TID Albuterol and PRN q4 hrs.   If unable to utilize MDI: HHN [physician-ordered bronchodilator(s)] TID and Albuterol PRN q4 hrs.   Notify physician if condition deteriorates. MDI THERAPY with  [physician-ordered bronchodilator(s)] via spacer TID PRN.   If unable to utilize MDI: HHN [physician-ordered bronchodilator(s)] TID PRN.   Notify physician if condition deteriorates.         If Acuity Level is 2, 3, or 4 in any of the following:    [x] COUGH     [x] SURGICAL HISTORY (SURG HX)  [x] CHEST XRAY (CXR)    Goal: Improvement in sputum mobilization in patients with ineffective airway clearance. Reverse atelectasis.    [x] Bronchopulmonary Hygiene Protocol      Total Acuity:   14-28  [x]  Secondary Assessment in 24 hrs Total Acuity:  9-13  []  Secondary Assessment in 24 hrs Total Acuity:  4-8  []  Secondary Assessment in 24 hrs Total Acuity:  0-3  []  Secondary Assessment in 48 hrs   METANEB QID with [physician-ordered bronchodilator(s)] if CXR Acuity = 4; otherwise:  PD&P, Oscillatory Therapy, or Vest QID & PRN AND PEP QID & PRN  NT Sxn PRN for ineffective cough  METANEB QID with [physician-ordered bronchodilator(s)] if CXR Acuity = 4; otherwise:  PD&P, Oscillatory Therapy or Vest QID & PRN AND PEP QID & PRN  NT Sxn PRN for ineffective cough  PD&P, Oscillatory Therapy, or Vest TID & PRN AND PEP TID & PRN   Instruct patient to self-perform IS q1hr WA       If Acuity Level is 2 or above in the following:    [] PULMONARY HISTORY (PULM HX)    Goal: Assist patient in quitting smoking to slow or stop the progression of lung disease.    [] Smoking Cessation Protocol    SMOKING CESSATION EDUCATION provided according to policy RT_201: (tatiana with an X)  ____Yes    ____ No     ____ NA    Smoking Cessation Booklet given:  ____Yes  ____No ____Patient Refused

## 2024-01-09 NOTE — PROGRESS NOTES
[facility] No bed at this time.

## 2024-01-09 NOTE — PROGRESS NOTES
[facility]  Inpatient/Observation/Outpatient Rehabilitation    Date: 2024  Patient Name: Grace Gonzalez       [x] Inpatient Acute/Observation       []  Outpatient  : 1945      Plan of Care/Recert ends    [] Pt no showed for scheduled appointment    [x] Pt refused/declined therapy at this time due to:  2 attempts were made. Attempt 1, she was working with respiratory, Attempt 2, pt. Stated that she was feeling very fatigued and that she has stomach pain but did not give a rating. Will try back tomorrow.          [] Pt cancelled due to:  [] No Reason Given   [] Sick/ill   [] Other:    Therapist/Assistant will attempt to see this patient, at our earliest opportunity.       Omayra López, PTA Date: 2024

## 2024-01-09 NOTE — PROGRESS NOTES
----- Message from Keyana Lepe MA sent at 5/23/2022  1:05 PM CDT -----  Thank you for working with me on this!  ----- Message -----  From: Morris Bhatia LPN  Sent: 5/23/2022  12:54 PM CDT  To: Keyana Lepe MA    Ok Scheduled with Dr. Rivero for 8am  ----- Message -----  From: Keyana Leep MA  Sent: 5/23/2022  10:15 AM CDT  To: Morris Bhatia LPN    Sounds good. I will schedule the bone marrow at 7am and you can follow?  ----- Message -----  From: Morris Bhatia LPN  Sent: 5/23/2022   9:18 AM CDT  To: Keyana Lepe MA    How about Wednesday June 1?    ----- Message -----  From: Keyana Lepe MA  Sent: 5/20/2022   4:17 PM CDT  To: Morris Bhatia LPN    Ok let me know when you get it scheduled and I will try to coordinate it with the bone marrow  ----- Message -----  From: Morris Bhatia LPN  Sent: 5/20/2022   4:01 PM CDT  To: Keyana Lepe MA    Hi,  Dr Rivero does surgery in [place] on Wednesdays.  Let me Know,  Morris  ----- Message -----  From: Keyana Leep MA  Sent: 5/20/2022   3:53 PM CDT  To: Josefa ADAM Staff    We are referring patient for port placement and Dr Jackson would like to coordinate doing a bone marrow biopsy at same time of port placement to save a patient time and additional sedation. Can you have the surgery scheduler contact me to work on this please?                  Called Dr. Ac at this time for consult. Left VM.

## 2024-01-09 NOTE — PROGRESS NOTES
New order for midodrine placed. Asked Mai NP about administering medication due to patient's NPO status. aMi informed not to give. Mai at bedside to speak with patient and daughter regarding updates on transfer. No facility has a bed at this time. Orders to consult general surgery regarding placement of chest tube and performing thoracentesis.

## 2024-01-09 NOTE — PROGRESS NOTES
Physical Therapy  Facility/Department: [facility] ICU  Daily Treatment Note  NAME: Grace Gonzalez  : 1945  MRN: [id number]    Date of Service: 2024    Discharge Recommendations:  Continue to assess pending progress, Subacute/Skilled Nursing Facility        Patient Diagnosis(es): The primary encounter diagnosis was Small bowel obstruction (HCC). Diagnoses of Other specified intestinal obstruction, unspecified whether partial or complete (HCC) and Acute respiratory failure with hypoxia (HCC) were also pertinent to this visit.    Assessment   Assessment: Bed Mobs: CGA/Min x1 with min vc's for hand placement to increase ease of movement. Transfers: CGA x1 with additional time needed due to abdominal pain with movement. Pt stating she \"felt like shit,\" after transfer from bed to chair and did not want to attempt additional ther-ex at this time. SPO2 dropped from low 90's to 80% with pt demoing retractions in the neck, eduction was given on pursed lip breathing at this time, pt recovered to low 90's after ~3 min of rest. Pt vitals WNL upon exit.  Activity Tolerance: Patient limited by fatigue;Patient limited by endurance     Plan    Physical Therapy Plan  General Plan: 2 times a day 7 days a week (1x/day on weekends and holidays)  Specific Instructions for Next Treatment: 1x/daily on weekends and holidays  Current Treatment Recommendations: Strengthening;Balance training;Gait training;Home exercise program;Therapeutic activities;Safety education & training;Neuromuscular re-education;Functional mobility training;Stair training;Transfer training;Patient/Caregiver education & training;Endurance training;Pain management     Restrictions  Restrictions/Precautions  Restrictions/Precautions: NPO, General Precautions, Fall Risk, Up as Tolerated  Required Braces or Orthoses?: No  Lower Extremity Weight Bearing Restrictions  Right Lower Extremity Weight Bearing: Weight Bearing As Tolerated     Subjective     Subjective  Subjective: Pt in bed upon arrival, agreeable to therapy  Pain: abdominal region, does not rate  Orientation  Overall Orientation Status: Within Functional Limits     Objective     Bed Mobility Training  Bed Mobility Training: Yes  Overall Level of Assistance: Contact-guard assistance;Minimum assistance;Assist X1  Interventions: Safety awareness training;Verbal cues  Rolling: Contact-guard assistance;Assist X1  Supine to Sit: Contact-guard assistance;Minimum assistance;Assist X1  Sit to Supine: Minimum assistance;Assist X1  Scooting: Contact-guard assistance;Assist X1  Balance  Sitting: Intact  Standing: Impaired  Standing - Static: Fair  Standing - Dynamic: Fair  Transfer Training  Transfer Training: Yes  Overall Level of Assistance: Contact-guard assistance;Assist X1;Additional time  Interventions: Safety awareness training;Verbal cues  Sit to Stand: Contact-guard assistance;Assist X1;Additional time  Stand to Sit: Contact-guard assistance;Assist X1;Additional time  Stand Pivot Transfers: Contact-guard assistance;Assist X1;Stand-by assistance  Bed to Chair: Contact-guard assistance;Assist X1  Gait Training  Gait Training: No     PT Exercises  Exercise Treatment: Seated exercises B LE x20 with RB d/t fatigue  Other Specialty Interventions  Other Treatments/Modalities: standing weight shifts side to side and fwd/back  Safety Devices  Type of Devices: All fall risk precautions in place;Call light within reach;Nurse notified;Left in chair (RN noted she did not need chair alarm at this time)       Goals  Short Term Goals  Time Frame for Short Term Goals: 20 days  Short Term Goal 1: Pt will be educated on her POC  Short Term Goal 2: Pt will perform all transfers SBA in order to increase independence  Short Term Goal 3: Pt will ambulate 100 feet with LRAD SBA in order to return home  Short Term Goal 4: Pt will tolerate 20 minutes of exercsies in order to improve strength  Patient Goals   Patient Goals : \"to

## 2024-01-09 NOTE — PROGRESS NOTES
INTENSIVE CARE UNIT   APRN - Progress Note    Patient - Grace Gonzalez  Date of Admission -  12/30/2023  7:55 PM  Date of Evaluation -  1/9/2024  Hospital Day - 10      SUBJECTIVE:     The Grace Gonzalez is a 78 y.o. female who is seen for follow up in the ICU.  Per nursing report and notes, overnight events include:  None .  She resting in bed with daughter at bedside. Remains on heated high flow.  NG remains clamped.  No nausea or vomiting reported.  No flatus or BM.  Awaiting transfer to [facility].  Needs bronchoscopy    Continue to work on transfer.  Spoke with [facility] access:    [facility]-no beds anytime soon  [facility]-will not talk until beds are available awaiting communication.  Took patient information and placed on wait list  [facility] no open beds.  Placed patient on wait list  [facility]-discharge dependent  [facility]-spoke with Dr. Pugh who stated patient would require progressive care at their facility for which she has no bed.  Patient is a DNR CC arrest so he explained they have to be selective on who is coming.  Recommends pigtail, thoracentesis and humidified BiPAP.  Would require intubation for bronchoscopy.  Will await beds      ROS:   Constitutional: negative  for fevers, and negative for chills.  Respiratory: positive for shortness of breath, positive for cough, and negative for wheezing  Cardiovascular: negative for chest pain, and negative for palpitations  Gastrointestinal: positive for abdominal pain, negative for nausea,negative for vomiting, negative for diarrhea, and negative for constipation    All other systems were reviewed with the patient and are negative unless otherwise stated in HPI.      OBJECTIVE:     VITAL SIGNS:  Patient Vitals for the past 8 hrs:   BP Temp Temp src Pulse Resp SpO2 Weight   01/09/24 0700 (!) 107/49 -- -- 59 24 91 % --   01/09/24 0600 (!) 104/49 -- -- 62 26 92 % --   01/09/24 0500 (!) 106/42 97.9 °F (36.6 °C) Temporal 66 19 92 %  01/04/2024 11:49 AM    [id number] 79.9 01/04/2024 11:49 AM         Radiology/Imaging:  XR CHEST PORTABLE   Final Result   Stable opacification of the left hemithorax with mediastinal shift to the   left.      Improved right basilar infiltrate.         XR ABDOMEN (KUB) (SINGLE AP VIEW)   Final Result   Interval improvement in small bowel distension, likely resolving ileus.   Enteric catheter in appropriate position.         CT CHEST ABDOMEN PELVIS WO CONTRAST Additional Contrast? None   Final Result   1. Moderate left pleural effusion with essentially complete collapse of the   left upper and lower lobe. A few tiny aerated lung foci up to 1 cm in size   are noted. Findings have developed since the comparison study from 8 days   ago. Differential is obstructing mass or mucous plugging/aspiration. Favor   the latter.   2. Small right pleural effusion with posterior basal consolidation and septal   thickening along the margin of consolidation.  Suggestive of pneumonia..   3. Interval development of water density 3.2 x 1.7 cm left lateral renal   upper pole hypodensity following the contour of the kidney which appears to   be a subcapsular collection. Indeterminate. Possible evolving subcapsular   hematoma. Attention on follow-up.   4. Multiple dilated mid abdomen small bowel loops with air-fluid levels and   some collapsed distal small bowel loops compatible with small bowel   obstruction similar finding on prior. Subjectively slight improvement in   extent of bowel involvement.   5. 3.0 cm fusiform infrarenal abdominal aortic aneurysm. Recommend follow-up   every 3 years.      RECOMMENDATIONS:   3 cm infrarenal abdominal aortic aneurysm. Recommend follow-up every 3 years.      Reference: J Am Brenden Radiol 2013;10:789-794.            XR CHEST PORTABLE   Final Result   New complete opacification of the left hemithorax, likely due to mucous   plugging.      Increased right basilar opacification, edema versus infiltrate.

## 2024-01-09 NOTE — PROGRESS NOTES
Report given to nurse at [facility] at this time.

## 2024-01-09 NOTE — PLAN OF CARE
Problem: Discharge Planning  Goal: Discharge to home or other facility with appropriate resources  Outcome: Progressing     Problem: Pain  Goal: Verbalizes/displays adequate comfort level or baseline comfort level  1/9/2024 0809 by Joie Hernandez RN  Outcome: Progressing  1/8/2024 1928 by Jeannie Cruz RN  Outcome: Progressing  Flowsheets (Taken 1/8/2024 1928)  Verbalizes/displays adequate comfort level or baseline comfort level:   Encourage patient to monitor pain and request assistance   Administer analgesics based on type and severity of pain and evaluate response   Assess pain using appropriate pain scale   Implement non-pharmacological measures as appropriate and evaluate response   Notify Licensed Independent Practitioner if interventions unsuccessful or patient reports new pain  Note: Pain meds given as needed & as ordered. Will continue to assess.     Problem: Safety - Adult  Goal: Free from fall injury  1/9/2024 0809 by Joie Hernandez RN  Outcome: Progressing  1/8/2024 1928 by Jeannie Cruz RN  Outcome: Progressing  Flowsheets (Taken 1/8/2024 1928)  Free From Fall Injury: Instruct family/caregiver on patient safety  Note: Pt is at high risk for falls. Non skid socks on. Bed in low position and wheels locked. Fall sign posted and bracelet on. Siderails up x 2. Bed alarm on. Call light within reach. Will continue to assess.     Problem: Skin/Tissue Integrity  Goal: Absence of new skin breakdown  Description: 1.  Monitor for areas of redness and/or skin breakdown  2.  Assess vascular access sites hourly  3.  Every 4-6 hours minimum:  Change oxygen saturation probe site  4.  Every 4-6 hours:  If on nasal continuous positive airway pressure, respiratory therapy assess nares and determine need for appliance change or resting period.  1/9/2024 0809 by Joie Hernandez RN  Outcome: Progressing  1/8/2024 1928 by Jeannie Cruz RN  Outcome: Progressing  Note: Skin assessed at regular intervals.  returns to baseline bowel function  Outcome: Progressing  Goal: Maintains adequate nutritional intake  Outcome: Progressing  Goal: Establish and maintain optimal ostomy function  Outcome: Progressing     Problem: Infection - Adult  Goal: Absence of infection at discharge  Outcome: Progressing     Problem: Nutrition Deficit:  Goal: Optimize nutritional status  1/9/2024 0809 by Joie Hernandez, RN  Outcome: Progressing  1/9/2024 0724 by Elpidio Guan, RD, LD  Outcome: Progressing  Flowsheets (Taken 1/8/2024 0610)  Nutrient intake appropriate for improving, restoring, or maintaining nutritional needs:   Recommend, monitor, and adjust tube feedings and TPN/PPN based on assessed needs   Monitor oral intake, labs, and treatment plans  Note: Nutrition Problem #1: Severe malnutrition  Intervention: Food and/or Nutrient Delivery: Continue Current Parenteral Nutrition       Problem: ABCDS Injury Assessment  Goal: Absence of physical injury  Outcome: Progressing

## 2024-01-09 NOTE — PROGRESS NOTES
Received call from [facility] with bed assignment for patient. Spoke with Mai to see if transfer is still the plan and she asked this nurse to speak with daughter and see if that's what she wants to proceed with. Daughter would like to speak with Dr. Ac first and hear his opinion before she makes a decision.

## 2024-01-09 NOTE — PROGRESS NOTES
Comprehensive Nutrition Assessment    Type and Reason for Visit:  Reassess    Nutrition Recommendations/Plan:   Continue same PN  Monitor GI viability.     Malnutrition Assessment:  Malnutrition Status:  Severe malnutrition (01/02/24 0831)    Context:  Acute Illness     Findings of the 6 clinical characteristics of malnutrition:  Energy Intake:  50% or less of estimated energy requirements for 5 or more days  Weight Loss:  No significant weight loss     Body Fat Loss:  Moderate body fat loss Fat Overlying Ribs, Buccal region, Orbital   Muscle Mass Loss:  Moderate muscle mass loss Hand (interosseous), Clavicles (pectoralis & deltoids), Temples (temporalis)  Fluid Accumulation:  Moderate to Severe Extremities   Strength:  Not Performed    Nutrition Assessment:    Stable malnutrition on parenteral support with progression to central PN yesterday. Mg++ and PO4 wnl at 2.2 and 3.1 respectively with glucose running  mg/gl with increased infusion of dextrose. Mildly low serum Na+ and stable albumin (from a trending standpoint). No PN changes recommended at this point with 28 kavon/kg current weight.  Await GI viability.    Nutrition Related Findings:    Absent LUQ, RLQ, LLQ and hypoactive RUQ b/s. Wound Type: Surgical Incision       Current Nutrition Intake & Therapies:    Average Meal Intake: NPO  Average Supplements Intake: NPO  Diet NPO  PN-Adult Premix 5/15 - Standard Electrolytes - Central Line    Anthropometric Measures:  Height: 162.6 cm (5' 4.02\")  Ideal Body Weight (IBW): 120 lbs (55 kg)    Admission Body Weight: 42.6 kg (94 lb)  Current Body Weight: 47.5 kg (104 lb 11.2 oz), 81.3 % IBW. Weight Source: Bed Scale  Current BMI (kg/m2): 18  Usual Body Weight: 49.9 kg (110 lb)  % Weight Change (Calculated): -4.8  Weight Adjustment For: No Adjustment                 BMI Categories: Underweight (BMI less than 18.5)    Estimated Daily Nutrient Needs:  Energy Requirements Based On: Kcal/kg  Weight Used for Energy  Requirements: Current  Energy (kcal/day): 9786-1130 (35-40)  Weight Used for Protein Requirements: Current  Protein (g/day): 66-80 (1.5-1.8)  Method Used for Fluid Requirements: 1 ml/kcal  Fluid (ml/day): 1800    Nutrition Diagnosis:   Severe malnutrition related to inadequate protein-energy intake as evidenced by moderate loss of subcutaneous fat, moderate muscle loss, weight loss, BMI, poor intake prior to admission    Recent Labs     01/07/24  0520 01/07/24  1330 01/08/24  0520 01/09/24  0520     --  134* 133*   K 3.4* 4.4 4.1 3.9   CL 98  --  101 100   CO2 30  --  26 25   BUN 18  --  21 23   CREATININE 0.3*  --  0.3* 0.4*   GLUCOSE 127*  --  105* 112*   AST 15  --  21  --    ALT 10  --  16  --    BILITOT 0.6  --  0.5  --    ALKPHOS 56  --  59  --       Lab Results   Component Value Date/Time    LABALBU 2.5 01/08/2024 05:20 AM      Lab Results   Component Value Date/Time    PHOS 3.1 01/09/2024 05:20 AM      Lab Results   Component Value Date/Time    MG 2.2 01/09/2024 05:20 AM    No results found for: \"PREALBUMIN\"    Nutrition Interventions:   Food and/or Nutrient Delivery: Continue Current Parenteral Nutrition  Nutrition Education/Counseling: No recommendation at this time  Coordination of Nutrition Care: Continue to monitor while inpatient  Plan of Care discussed with: nursing    Goals:  Previous Goal Met: Progressing toward Goal(s)  Goals: Meet at least 75% of estimated needs       Nutrition Monitoring and Evaluation:   Behavioral-Environmental Outcomes: None Identified  Food/Nutrient Intake Outcomes: Parenteral Nutrition Intake/Tolerance  Physical Signs/Symptoms Outcomes: Biochemical Data, Nutrition Focused Physical Findings, Weight, Fluid Status or Edema, GI Status    Discharge Planning:    Too soon to determine     Elpidio Guan RD, LD  Contact: [phone]

## 2024-01-09 NOTE — PROGRESS NOTES
Pt resting in bed awake with daughter at bedside. Assessment and vital signs as charted. Pt is A & O x 4. Pt continues to be in pain and writer will medicate pt when it has been 1 hour from Morphine dose that was given at 1814. Pt and daughter agree with pain medication POC. HHFNC had to be increased d/t continuous pulse ox running at 86%. Continuous pulse at this time is WNL. Calderon catheter intact, draining and patent. TPN and lipids infusing as ordered. Pt laying on left side with pillow support. Cardiac monitor shows NSR. Pt denies any other needs at this time. Call light in reach. Bed alarm on. Will continue to monitor.

## 2024-01-09 NOTE — PROGRESS NOTES
Occupational Therapy  Facility/Department: [facility] ICU  Daily Treatment Note  NAME: Grace Gonzalez  : 1945  MRN: [id number]    Date of Service: 2024    Discharge Recommendations:  Continue to assess pending progress, Subacute/Skilled Nursing Facility         Patient Diagnosis(es): The primary encounter diagnosis was Small bowel obstruction (HCC). Diagnoses of Other specified intestinal obstruction, unspecified whether partial or complete (HCC) and Acute respiratory failure with hypoxia (HCC) were also pertinent to this visit.     Assessment    Activity Tolerance: Patient limited by fatigue;Patient limited by endurance  Discharge Recommendations: Continue to assess pending progress;Subacute/Skilled Nursing Facility      Plan   Occupational Therapy Plan  Times Per Day: Once a day  Days Per Week: 7 Days  Current Treatment Recommendations: Strengthening;Balance training;Functional mobility training;Endurance training;Safety education & training;Patient/Caregiver education & training;Positioning;Self-Care / ADL;Home management training     Restrictions  Restrictions/Precautions  Restrictions/Precautions: NPO;General Precautions;Fall Risk;Up as Tolerated    Subjective   Subjective  Subjective: Pt lying in bed upon arrival. Pt agreed to participate in therapy session.  Pain: Pt reported abdominal pain however did not rate.             Objective    Vitals       ADL  Functional Mobility: Contact guard assistance  Functional Mobility Skilled Clinical Factors: CGA x 1 to complete func mob from bed to chair with RW.  Additional Comments: CGA to complete bed mob from supine to sit EOB. CGA to complete sit to stand from EOB.        Safety Devices  Type of Devices: All fall risk precautions in place;Call light within reach;Nurse notified;Left in chair     Patient Education  Education Given To: Patient  Education Provided: Role of Therapy;Plan of Care  Education Method: Verbal  Barriers to Learning: None  Education Outcome:

## 2024-01-09 NOTE — CONSULTS
TPN Follow Up Note: Pharmacy Consult    Today's Labs:  Recent Labs     01/09/24  0520   *     Recent Labs     01/09/24  0520        Recent Labs     01/09/24  0520   K 3.9     Recent Labs     01/09/24  0520   MG 2.2     Recent Labs     01/09/24  0520   PHOS 3.1     Recent Labs     01/09/24  0520   GLUCOSE 112*     Recent Labs     01/09/24  0520   BUN 23         Assessment: continue current TPN at same rate  Electrolyte Replacement: none    TPN changes for (today) at 1800: none    PN Product Dispensed: CLINIMIX-E  5/15     Option:(5/15     or    4.25/5)    Lipid Product Dispensed: Intralipid 20%    Rates of Infusion   Lipid Infusion Rate: 14.6  ML/hr x 12 hours   Clinimix-E Infusion Rate: 58.3 mL/hr          Electrolytes (per bag) Clinimix E   Na= 70 mEq/Bag (2000ml)   K= 60 mEq/Bag (2000ml)              Mg= 10 mEq/Bag (2000ml)              Ca= 4.4 mmol/Bag (2000ml)              Acetate= 160 mEq/Bag (2000ml)  (140mEq/Bag-Peripheral Formula)              Chloride= 78 mEq/Bag (2000ml)              Phosphate= 30mMol/Bag (2000ml)                            MVI= 10 mL/Bag 2000ml   Trace Elements= 1 mL/Bag (2000ml)  **NOTE: Electrolytes are based upon the total volume of the TPN bag (2000ml).  The amount of electrolytes received is based upon the rate/volume of the total infusion.      Re-check BMP, Mg, PO4, iCa with next labs  Thank you for the consult!  Rhoda Hanks [facility],1/9/2024, 9:19 AM

## 2024-01-09 NOTE — PROGRESS NOTES
Pt resting in bed with eyes closed. Respirations even and unlabored. No distress noted. Pt wakes easily to verbal stimuli. Assessment and vital signs as charted. Pt is A & O x 4. Pt rates pain 4/10, pain medication to be given. Cardiac monitor continues to show NSR. HHFNC remains in use at 55% and 50L. Continuous pulse ox WNL. Calderon catheter remains intact, draining and patent. TPN and lipids infusing as ordered. Pt wants to stay laying on left side with pillow support. NG continues to be clamped, 0mL of residual noted. Pt denies any other needs at this time. Call light in reach. Bed alarm on. Will continue to monitor. SILAS Bonilla aware of MAP's 60 and greater, no new orders at this time.

## 2024-01-09 NOTE — PROGRESS NOTES
Continuous pulse ox running 97%. HHFNC FiO2 decreased from 73% to 60% at this time. Will continue to monitor.

## 2024-01-09 NOTE — PROGRESS NOTES
Report given to MICU team. Patient off the floor at this time. Daughter to take home all patients belongings.

## 2024-01-09 NOTE — PROGRESS NOTES
Pt resting in bed quietly with eyes closed. Respirations even and unlabored. No distress noted. Pt wakes easily to verbal stimuli. Assessment and vital signs as charted. Pt states that she slept better tonight. Pt is A & O x 4. Pt c/o pain 5/10. Morphine to be given as ordered. Cardiac monitor continues to show NSR. HHFNC remains in use. TPN continues to infuse as ordered. Calderon catheter remains intact, patent and draining. Pt denies any other needs at this time. Call light in reach. Bed alarm on. Will continue to monitor.

## 2024-01-10 ENCOUNTER — APPOINTMENT (OUTPATIENT)
Dept: GENERAL RADIOLOGY | Age: 79
End: 2024-01-10
Attending: INTERNAL MEDICINE
Payer: MEDICARE

## 2024-01-10 ENCOUNTER — APPOINTMENT (OUTPATIENT)
Dept: ULTRASOUND IMAGING | Age: 79
End: 2024-01-10
Attending: INTERNAL MEDICINE
Payer: MEDICARE

## 2024-01-10 PROBLEM — J90 PLEURAL EFFUSION, LEFT: Status: ACTIVE | Noted: 2024-01-10

## 2024-01-10 LAB
ALBUMIN FLD-MCNC: 1.3 GM/DL
ALBUMIN SERPL BCG-MCNC: 2.4 G/DL (ref 3.5–5.1)
ANION GAP SERPL CALC-SCNC: 8 MEQ/L (ref 8–16)
BASOPHILS ABSOLUTE: 0.1 THOU/MM3 (ref 0–0.1)
BASOPHILS NFR BLD AUTO: 0.4 %
BILIRUB UR QL STRIP.AUTO: NEGATIVE
BUN SERPL-MCNC: 21 MG/DL (ref 7–22)
CA-I BLD ISE-SCNC: 1.22 MMOL/L (ref 1.12–1.32)
CALCIUM SERPL-MCNC: 8.5 MG/DL (ref 8.5–10.5)
CHARACTER UR: CLEAR
CHARACTER, BODY FLUID: NORMAL
CHLORIDE SERPL-SCNC: 102 MEQ/L (ref 98–111)
CO2 SERPL-SCNC: 25 MEQ/L (ref 23–33)
COLOR FLD: YELLOW
COLOR: YELLOW
CREAT SERPL-MCNC: 0.4 MG/DL (ref 0.4–1.2)
DEPRECATED RDW RBC AUTO: 46.5 FL (ref 35–45)
EOSINOPHIL NFR BLD AUTO: 0.8 %
EOSINOPHILS ABSOLUTE: 0.1 THOU/MM3 (ref 0–0.4)
ERYTHROCYTE [DISTWIDTH] IN BLOOD BY AUTOMATED COUNT: 13.9 % (ref 11.5–14.5)
GFR SERPL CREATININE-BSD FRML MDRD: > 60 ML/MIN/1.73M2
GLUCOSE BLD STRIP.AUTO-MCNC: 127 MG/DL (ref 70–108)
GLUCOSE FLD-MCNC: 133 MG/DL
GLUCOSE SERPL-MCNC: 118 MG/DL (ref 70–108)
GLUCOSE UR QL STRIP.AUTO: NEGATIVE MG/DL
GRANULOCYTES NFR FLD AUTO: 11.4 %
HCT VFR BLD AUTO: 28.4 % (ref 37–47)
HGB BLD-MCNC: 9.4 GM/DL (ref 12–16)
HGB UR QL STRIP.AUTO: NEGATIVE
IMM GRANULOCYTES # BLD AUTO: 0.5 THOU/MM3 (ref 0–0.07)
IMM GRANULOCYTES NFR BLD AUTO: 3.7 %
KETONES UR QL STRIP.AUTO: NEGATIVE
LDH FLD L TO P-CCNC: 113 U/L
LDH SERPL L TO P-CCNC: 185 U/L (ref 100–190)
LYMPHOCYTES ABSOLUTE: 3.1 THOU/MM3 (ref 1–4.8)
LYMPHOCYTES NFR BLD AUTO: 23 %
MAGNESIUM SERPL-MCNC: 2.6 MG/DL (ref 1.6–2.4)
MCH RBC QN AUTO: 30.5 PG (ref 26–33)
MCHC RBC AUTO-ENTMCNC: 33.1 GM/DL (ref 32.2–35.5)
MCV RBC AUTO: 92.2 FL (ref 81–99)
MESOTHELIAL CELLS BODY FLUID: NORMAL
MONOCYTES ABSOLUTE: 1.2 THOU/MM3 (ref 0.4–1.3)
MONOCYTES NFR BLD AUTO: 8.8 %
MONONUC CELLS NFR FLD AUTO: 88.6 %
MRSA DNA SPEC QL NAA+PROBE: NEGATIVE
NEUTROPHILS NFR BLD AUTO: 63.3 %
NITRITE UR QL STRIP: NEGATIVE
NRBC BLD AUTO-RTO: 0 /100 WBC
NUC CELL # FLD AUTO: 310 /CUMM (ref 0–500)
PATHOLOGIST REVIEW: NORMAL
PH BIFL: 7.72 [PH]
PH UR STRIP.AUTO: 6.5 [PH] (ref 5–9)
PHOSPHATE SERPL-MCNC: 3.1 MG/DL (ref 2.4–4.7)
PLATELET # BLD AUTO: 230 THOU/MM3 (ref 130–400)
PMV BLD AUTO: 9.9 FL (ref 9.4–12.4)
POTASSIUM SERPL-SCNC: 3.8 MEQ/L (ref 3.5–5.2)
PROT FLD-MCNC: 2 GM/DL
PROT SERPL-MCNC: 5.5 G/DL (ref 6.1–8)
PROT UR STRIP.AUTO-MCNC: NEGATIVE MG/DL
RBC # BLD AUTO: 3.08 MILL/MM3 (ref 4.2–5.4)
RBC # FLD AUTO: 3000 /CUMM
SEGMENTED NEUTROPHILS ABSOLUTE COUNT: 8.6 THOU/MM3 (ref 1.8–7.7)
SODIUM SERPL-SCNC: 135 MEQ/L (ref 135–145)
SP GR UR REFRACT.AUTO: 1.01 (ref 1–1.03)
SPECIMEN: NORMAL
TOTAL VOLUME RECEIVED BODY FLUID: 73 ML
UROBILINOGEN, URINE: 0.2 EU/DL (ref 0–1)
WBC # BLD AUTO: 13.6 THOU/MM3 (ref 4.8–10.8)
WBC #/AREA URNS HPF: NEGATIVE /[HPF]

## 2024-01-10 PROCEDURE — 84100 ASSAY OF PHOSPHORUS: CPT

## 2024-01-10 PROCEDURE — 36415 COLL VENOUS BLD VENIPUNCTURE: CPT

## 2024-01-10 PROCEDURE — 71045 X-RAY EXAM CHEST 1 VIEW: CPT

## 2024-01-10 PROCEDURE — 99232 SBSQ HOSP IP/OBS MODERATE 35: CPT | Performed by: INTERNAL MEDICINE

## 2024-01-10 PROCEDURE — 81003 URINALYSIS AUTO W/O SCOPE: CPT

## 2024-01-10 PROCEDURE — 2060000000 HC ICU INTERMEDIATE R&B

## 2024-01-10 PROCEDURE — 2580000003 HC RX 258: Performed by: PHYSICIAN ASSISTANT

## 2024-01-10 PROCEDURE — 82330 ASSAY OF CALCIUM: CPT

## 2024-01-10 PROCEDURE — 94640 AIRWAY INHALATION TREATMENT: CPT

## 2024-01-10 PROCEDURE — 89050 BODY FLUID CELL COUNT: CPT

## 2024-01-10 PROCEDURE — 6360000002 HC RX W HCPCS: Performed by: PHARMACIST

## 2024-01-10 PROCEDURE — 83735 ASSAY OF MAGNESIUM: CPT

## 2024-01-10 PROCEDURE — 32555 ASPIRATE PLEURA W/ IMAGING: CPT

## 2024-01-10 PROCEDURE — 94761 N-INVAS EAR/PLS OXIMETRY MLT: CPT

## 2024-01-10 PROCEDURE — 82040 ASSAY OF SERUM ALBUMIN: CPT

## 2024-01-10 PROCEDURE — 2700000000 HC OXYGEN THERAPY PER DAY

## 2024-01-10 PROCEDURE — 87070 CULTURE OTHR SPECIMN AEROBIC: CPT

## 2024-01-10 PROCEDURE — 83986 ASSAY PH BODY FLUID NOS: CPT

## 2024-01-10 PROCEDURE — 88305 TISSUE EXAM BY PATHOLOGIST: CPT

## 2024-01-10 PROCEDURE — 82945 GLUCOSE OTHER FLUID: CPT

## 2024-01-10 PROCEDURE — A4216 STERILE WATER/SALINE, 10 ML: HCPCS | Performed by: PHYSICIAN ASSISTANT

## 2024-01-10 PROCEDURE — 2500000003 HC RX 250 WO HCPCS: Performed by: PHYSICIAN ASSISTANT

## 2024-01-10 PROCEDURE — 83615 LACTATE (LD) (LDH) ENZYME: CPT

## 2024-01-10 PROCEDURE — 87205 SMEAR GRAM STAIN: CPT

## 2024-01-10 PROCEDURE — 0W9B3ZZ DRAINAGE OF LEFT PLEURAL CAVITY, PERCUTANEOUS APPROACH: ICD-10-PCS | Performed by: INTERNAL MEDICINE

## 2024-01-10 PROCEDURE — 2580000003 HC RX 258: Performed by: PHARMACIST

## 2024-01-10 PROCEDURE — 99223 1ST HOSP IP/OBS HIGH 75: CPT | Performed by: INTERNAL MEDICINE

## 2024-01-10 PROCEDURE — 82948 REAGENT STRIP/BLOOD GLUCOSE: CPT

## 2024-01-10 PROCEDURE — 88108 CYTOPATH CONCENTRATE TECH: CPT

## 2024-01-10 PROCEDURE — 82042 OTHER SOURCE ALBUMIN QUAN EA: CPT

## 2024-01-10 PROCEDURE — 2500000003 HC RX 250 WO HCPCS: Performed by: PHARMACIST

## 2024-01-10 PROCEDURE — 6370000000 HC RX 637 (ALT 250 FOR IP): Performed by: INTERNAL MEDICINE

## 2024-01-10 PROCEDURE — 88112 CYTOPATH CELL ENHANCE TECH: CPT

## 2024-01-10 PROCEDURE — 84155 ASSAY OF PROTEIN SERUM: CPT

## 2024-01-10 PROCEDURE — 6360000002 HC RX W HCPCS: Performed by: PHYSICIAN ASSISTANT

## 2024-01-10 PROCEDURE — 80048 BASIC METABOLIC PNL TOTAL CA: CPT

## 2024-01-10 PROCEDURE — 84157 ASSAY OF PROTEIN OTHER: CPT

## 2024-01-10 PROCEDURE — 87075 CULTR BACTERIA EXCEPT BLOOD: CPT

## 2024-01-10 PROCEDURE — 2500000003 HC RX 250 WO HCPCS: Performed by: RADIOLOGY

## 2024-01-10 PROCEDURE — 85025 COMPLETE CBC W/AUTO DIFF WBC: CPT

## 2024-01-10 RX ORDER — MV-MN/OM3/DHA/EPA/FISH/LUT/ZEA 250-5-1 MG
1 CAPSULE ORAL DAILY
Status: ON HOLD | COMMUNITY

## 2024-01-10 RX ORDER — IPRATROPIUM BROMIDE AND ALBUTEROL SULFATE 2.5; .5 MG/3ML; MG/3ML
1 SOLUTION RESPIRATORY (INHALATION)
Status: DISCONTINUED | OUTPATIENT
Start: 2024-01-10 | End: 2024-01-12 | Stop reason: HOSPADM

## 2024-01-10 RX ORDER — TETRACAINE HCL 10 MG/ML
10 INJECTION SUBARACHNOID ONCE
Status: COMPLETED | OUTPATIENT
Start: 2024-01-10 | End: 2024-01-10

## 2024-01-10 RX ORDER — MULTIVIT WITH MINERALS/LUTEIN
250 TABLET ORAL DAILY
Status: ON HOLD | COMMUNITY

## 2024-01-10 RX ORDER — DEXTROSE MONOHYDRATE 100 MG/ML
INJECTION, SOLUTION INTRAVENOUS CONTINUOUS PRN
Status: DISCONTINUED | OUTPATIENT
Start: 2024-01-10 | End: 2024-01-12 | Stop reason: HOSPADM

## 2024-01-10 RX ORDER — VITAMIN E 268 MG
400 CAPSULE ORAL DAILY
Status: ON HOLD | COMMUNITY

## 2024-01-10 RX ORDER — DEXTROSE MONOHYDRATE 100 MG/ML
INJECTION, SOLUTION INTRAVENOUS CONTINUOUS
Status: ACTIVE | OUTPATIENT
Start: 2024-01-10 | End: 2024-01-10

## 2024-01-10 RX ORDER — INSULIN LISPRO 100 [IU]/ML
0-4 INJECTION, SOLUTION INTRAVENOUS; SUBCUTANEOUS EVERY 6 HOURS
Status: DISCONTINUED | OUTPATIENT
Start: 2024-01-11 | End: 2024-01-12 | Stop reason: HOSPADM

## 2024-01-10 RX ORDER — IBUPROFEN 600 MG/1
1 TABLET ORAL PRN
Status: DISCONTINUED | OUTPATIENT
Start: 2024-01-10 | End: 2024-01-12 | Stop reason: HOSPADM

## 2024-01-10 RX ADMIN — MORPHINE SULFATE 1 MG: 2 INJECTION, SOLUTION INTRAMUSCULAR; INTRAVENOUS at 14:57

## 2024-01-10 RX ADMIN — IPRATROPIUM BROMIDE AND ALBUTEROL SULFATE 1 DOSE: .5; 3 SOLUTION RESPIRATORY (INHALATION) at 12:20

## 2024-01-10 RX ADMIN — IPRATROPIUM BROMIDE AND ALBUTEROL SULFATE 1 DOSE: .5; 3 SOLUTION RESPIRATORY (INHALATION) at 15:59

## 2024-01-10 RX ADMIN — DEXTROSE MONOHYDRATE: 100 INJECTION, SOLUTION INTRAVENOUS at 16:22

## 2024-01-10 RX ADMIN — MORPHINE SULFATE 1 MG: 2 INJECTION, SOLUTION INTRAMUSCULAR; INTRAVENOUS at 09:18

## 2024-01-10 RX ADMIN — IPRATROPIUM BROMIDE AND ALBUTEROL SULFATE 1 DOSE: .5; 3 SOLUTION RESPIRATORY (INHALATION) at 09:00

## 2024-01-10 RX ADMIN — CEFEPIME 2000 MG: 2 INJECTION, POWDER, FOR SOLUTION INTRAVENOUS at 12:27

## 2024-01-10 RX ADMIN — ONDANSETRON 4 MG: 2 INJECTION INTRAMUSCULAR; INTRAVENOUS at 15:01

## 2024-01-10 RX ADMIN — IPRATROPIUM BROMIDE AND ALBUTEROL SULFATE 1 DOSE: .5; 3 SOLUTION RESPIRATORY (INHALATION) at 21:07

## 2024-01-10 RX ADMIN — SODIUM CHLORIDE: 9 INJECTION, SOLUTION INTRAVENOUS at 12:20

## 2024-01-10 RX ADMIN — ONDANSETRON 4 MG: 2 INJECTION INTRAMUSCULAR; INTRAVENOUS at 21:24

## 2024-01-10 RX ADMIN — MORPHINE SULFATE 1 MG: 2 INJECTION, SOLUTION INTRAMUSCULAR; INTRAVENOUS at 18:59

## 2024-01-10 RX ADMIN — TETRACAINE HCL INJECTION 4 ML: 10 INJECTION SUBARACHNOID at 13:30

## 2024-01-10 RX ADMIN — CEFEPIME 2000 MG: 2 INJECTION, POWDER, FOR SOLUTION INTRAVENOUS at 00:01

## 2024-01-10 RX ADMIN — MORPHINE SULFATE 1 MG: 2 INJECTION, SOLUTION INTRAMUSCULAR; INTRAVENOUS at 21:26

## 2024-01-10 RX ADMIN — OLIVE OIL AND SOYBEAN OIL 14.6 ML/HR: 16; 4 INJECTION, EMULSION INTRAVENOUS at 01:29

## 2024-01-10 RX ADMIN — CALCIUM GLUCONATE: 98 INJECTION, SOLUTION INTRAVENOUS at 22:32

## 2024-01-10 RX ADMIN — FAMOTIDINE 20 MG: 10 INJECTION, SOLUTION INTRAVENOUS at 21:17

## 2024-01-10 RX ADMIN — SODIUM CHLORIDE, PRESERVATIVE FREE 10 ML: 5 INJECTION INTRAVENOUS at 09:19

## 2024-01-10 RX ADMIN — SODIUM CHLORIDE, PRESERVATIVE FREE 10 ML: 5 INJECTION INTRAVENOUS at 21:31

## 2024-01-10 RX ADMIN — MORPHINE SULFATE 1 MG: 2 INJECTION, SOLUTION INTRAMUSCULAR; INTRAVENOUS at 12:28

## 2024-01-10 ASSESSMENT — PAIN SCALES - WONG BAKER
WONGBAKER_NUMERICALRESPONSE: 4;2
WONGBAKER_NUMERICALRESPONSE: 2
WONGBAKER_NUMERICALRESPONSE: 4
WONGBAKER_NUMERICALRESPONSE: 2
WONGBAKER_NUMERICALRESPONSE: 4
WONGBAKER_NUMERICALRESPONSE: 2

## 2024-01-10 ASSESSMENT — PAIN SCALES - GENERAL
PAINLEVEL_OUTOF10: 5
PAINLEVEL_OUTOF10: 6
PAINLEVEL_OUTOF10: 4
PAINLEVEL_OUTOF10: 6
PAINLEVEL_OUTOF10: 4
PAINLEVEL_OUTOF10: 0
PAINLEVEL_OUTOF10: 0
PAINLEVEL_OUTOF10: 10
PAINLEVEL_OUTOF10: 5
PAINLEVEL_OUTOF10: 3
PAINLEVEL_OUTOF10: 0
PAINLEVEL_OUTOF10: 0
PAINLEVEL_OUTOF10: 5
PAINLEVEL_OUTOF10: 0
PAINLEVEL_OUTOF10: 5
PAINLEVEL_OUTOF10: 10
PAINLEVEL_OUTOF10: 0
PAINLEVEL_OUTOF10: 5
PAINLEVEL_OUTOF10: 4
PAINLEVEL_OUTOF10: 0

## 2024-01-10 ASSESSMENT — PAIN DESCRIPTION - ORIENTATION
ORIENTATION: LEFT
ORIENTATION: MID
ORIENTATION: RIGHT;MID;LEFT
ORIENTATION: LEFT

## 2024-01-10 ASSESSMENT — PAIN DESCRIPTION - FREQUENCY
FREQUENCY: CONTINUOUS

## 2024-01-10 ASSESSMENT — PAIN - FUNCTIONAL ASSESSMENT
PAIN_FUNCTIONAL_ASSESSMENT: ACTIVITIES ARE NOT PREVENTED
PAIN_FUNCTIONAL_ASSESSMENT: PREVENTS OR INTERFERES SOME ACTIVE ACTIVITIES AND ADLS
PAIN_FUNCTIONAL_ASSESSMENT: PREVENTS OR INTERFERES SOME ACTIVE ACTIVITIES AND ADLS
PAIN_FUNCTIONAL_ASSESSMENT: ACTIVITIES ARE NOT PREVENTED
PAIN_FUNCTIONAL_ASSESSMENT: ACTIVITIES ARE NOT PREVENTED
PAIN_FUNCTIONAL_ASSESSMENT: PREVENTS OR INTERFERES SOME ACTIVE ACTIVITIES AND ADLS

## 2024-01-10 ASSESSMENT — PAIN DESCRIPTION - LOCATION
LOCATION: ABDOMEN;BACK
LOCATION: ABDOMEN
LOCATION: BACK
LOCATION: BACK
LOCATION: ABDOMEN
LOCATION: ABDOMEN;BACK
LOCATION: ABDOMEN

## 2024-01-10 ASSESSMENT — PAIN DESCRIPTION - PAIN TYPE
TYPE: SURGICAL PAIN

## 2024-01-10 ASSESSMENT — PAIN DESCRIPTION - DESCRIPTORS
DESCRIPTORS: ACHING
DESCRIPTORS: ACHING;DISCOMFORT
DESCRIPTORS: ACHING
DESCRIPTORS: ACHING;DISCOMFORT

## 2024-01-10 ASSESSMENT — PAIN DESCRIPTION - ONSET
ONSET: ON-GOING

## 2024-01-10 NOTE — H&P
Hospitalist - History & Physical      Patient: Grace Gonzalez    Unit/Bed:4K-24/024-A  YOB: 1945  MRN: [id number]   Acct: [id number]   PCP: Diomedes Hall MD      Assessment and Plan:        Acute hypoxic respiratory failure:   On HFNC 90% FiO2 60L/min  Moderate left pleural effusion with collapse of left upper and lower lobs on CT 1/6/2024  Consult pulmonology for thoracentesis vs bronchoscopy  Continue cefepime IV q 12 hours  Small bowel obstruction:   POD #9 exploratory laparatomy  Still no flatus or bowel movement  Has been NPO, NG clamped in place  On TPN - will continue current orders  Chronic joint pain:   Long term  No outpatient work up prior  Pain management  Outpatient follow up at discharge  Hyponatremia:   Trend with daily labs    CC:  acute respiratory failure    HPI: Patient transferred from [facility] for further treatment of acute hypoxic respiratory failure.  The patient was admitted at [facility] 12/30/2023 for a small bowel obstruction and had exploratory laparatomy 12/31/2023.  On 1/3/2024 patient developed hypoxia and required HFNC alternating with non rebreather mask. Patient was in the ICU and required vasopressor support 1/4/2024 through 1/7/2024.  Patient's blood pressures are improved.    Patient had CT of chest 1/8/2024 that demonstrated left pleural effusion.  Patient was transferred for pulmonology consult.    ROS: Review of Systems   Constitutional:  Positive for activity change. Negative for fever.   HENT: Negative.     Eyes: Negative.    Respiratory:  Positive for shortness of breath. Negative for cough.    Cardiovascular: Negative.    Gastrointestinal:  Positive for abdominal pain.   Endocrine: Negative.    Genitourinary: Negative.    Musculoskeletal: Negative.    Skin: Negative.    Allergic/Immunologic: Negative.    Neurological: Negative.    Hematological: Negative.    Psychiatric/Behavioral: Negative.       PMH:    Past Medical History:   Diagnosis

## 2024-01-10 NOTE — CONSULTS
No sore throat or post nasal drip. No sinus pain/pressure.   Lower respiratory tract/ lungs: Productive cough with thick white sputum  Cardiovascular: no  Gastrointestinal: diffuse abdominal pain- post op exploratory laparotomy. Occasional nausea.   Extremities: Complains of joint pain that is chronic. Otherwise no complaints.  Musculoskeletal: Does complain of diffuse joint pain and back pain. Both are chronic.  Genitourinary: No complaints  Psychiatric/Behavioral: The patient and her daughter report that the patients mood has improved as she has been feeling better.  Skin: no complaints    Vitals     height is 1.626 m (5' 4\") and weight is 43 kg (94 lb 12.8 oz). Her oral temperature is 98.5 °F (36.9 °C). Her blood pressure is 101/50 (abnormal) and her pulse is 66. Her respiration is 18 and oxygen saturation is 100%.   Body mass index is 16.27 kg/m².    SUPPLEMENTAL O2: O2 flow rate was titrated down to 50 LPM at 40% FIO2 from 60 LPM at 40 % FIO2. Patient was maintaining an SpO2 of 92%. The patients nurse was informed.    I/O      Intake/Output Summary (Last 24 hours) at 1/10/2024 1021  Last data filed at 1/10/2024 0923  Gross per 24 hour   Intake 1099.8 ml   Output 1130 ml   Net -30.2 ml     I/O last 3 completed shifts:  In: 147.9 [I.V.:60; IV Piggyback:87.9]  Out: 980 [Urine:980]   Patient Vitals for the past 96 hrs (Last 3 readings):   Weight   01/09/24 1905 43 kg (94 lb 12.8 oz)       Exam   Nursing note and vitals reviewed.  Constitutional: Patient AOx4. Ill appearing. Pleasant demeanor with good sense of humor.  HENT:   Head: No tenderness.  Right Ear: No obvious lesions or discharge  Left Ear: No obvious lesions or discharge  Mouth/Throat: No erythremia or exudate.  Eyes: No erythema or conjunctival icterus. Pupils reactive. Ocular movements grossly intact  Cardiovascular: Regular rate and rhythm. No murmurs, rubs, or gallops.  Pulmonary/Chest:  Increased respiratory effort with some use of accessory  mainstem bronchus.     Lungs/pleura: Moderate left pleural effusion with essentially complete  collapse of the left upper and lower lobe.  A few tiny aerated lung foci up  to 1 cm in size are noted.  Findings have developed since the comparison  study from 8 days ago.  Differential is obstructing mass or mucous  plugging/aspiration.  Favor the latter.     Emphysematous hyperexpanded right lung.  Right apical pleural-parenchymal  scarring.  Small right pleural effusion with posterior basal consolidation  and septal thickening along the margin of consolidation.  Suspect pneumonia.     No pneumothorax.     Soft Tissues/Bones: Left breast implant.  No aggressive bone lesion.      IMPRESSION:  1. Moderate left pleural effusion with essentially complete collapse of the  left upper and lower lobe. A few tiny aerated lung foci up to 1 cm in size  are noted. Findings have developed since the comparison study from 8 days  ago. Differential is obstructing mass or mucous plugging/aspiration. Favor  the latter.  2. Small right pleural effusion with posterior basal consolidation and septal  thickening along the margin of consolidation.  Suggestive of pneumonia..  3. Interval development of water density 3.2 x 1.7 cm left lateral renal  upper pole hypodensity following the contour of the kidney which appears to  be a subcapsular collection. Indeterminate. Possible evolving subcapsular  hematoma. Attention on follow-up.     (See actual reports for details)    Assessment   Acute hypoxemic respiratory failure: likely secondary to left pleural effusion and post-op atelectasis. Pneumonia and mucus plugging were also suspected as contributing etiologies. Underlying undiagnosed COPD may also be contributing.  Pleural Effusion-left sided w/ atelectasis: thoracentesis performed 1/10/2024, labs pending.  Suspected COPD: Suspicion for COPD is high. The patient has a greater than 60 pack-year history of tobacco smoking along with occupational

## 2024-01-10 NOTE — CARE COORDINATION
01/10/24 1101   Readmission Assessment   Number of Days since last admission? 1-7 days   Previous Disposition Other (comment)  (not readmit, from [facility])   Who is being Interviewed Caregiver   What was the patient's/caregiver's perception as to why they think they needed to return back to the hospital? Other (Comment)  (HFO needs)   Did you visit your Primary Care Physician after you left the hospital, before you returned this time? No  (from [facility])   Did you see a specialist, such as Cardiac, Pulmonary, Orthopedic Physician, etc. after you left the hospital? Yes  (at [facility])   Who advised the patient to return to the hospital? Other (Comment)  ([facility])   Does the patient report anything that got in the way of taking their medications? No   In our efforts to provide the best possible care to you and others like you, can you think of anything that we could have done to help you after you left the hospital the first time, so that you might not have needed to return so soon? Other (Comment)  (from [facility])

## 2024-01-10 NOTE — PLAN OF CARE
Problem: Safety - Adult  Goal: Free from fall injury  Outcome: Progressing  Flowsheets (Taken 1/10/2024 1736)  Free From Fall Injury:   Instruct family/caregiver on patient safety   Based on caregiver fall risk screen, instruct family/caregiver to ask for assistance with transferring infant if caregiver noted to have fall risk factors     Problem: Discharge Planning  Goal: Discharge to home or other facility with appropriate resources  Outcome: Progressing  Flowsheets (Taken 1/10/2024 1736)  Discharge to home or other facility with appropriate resources:   Identify barriers to discharge with patient and caregiver   Identify discharge learning needs (meds, wound care, etc)   Refer to discharge planning if patient needs post-hospital services based on physician order or complex needs related to functional status, cognitive ability or social support system   Arrange for needed discharge resources and transportation as appropriate   Arrange for interpreters to assist at discharge as needed     Problem: Skin/Tissue Integrity  Goal: Absence of new skin breakdown  Description: 1.  Monitor for areas of redness and/or skin breakdown  2.  Assess vascular access sites hourly  3.  Every 4-6 hours minimum:  Change oxygen saturation probe site  4.  Every 4-6 hours:  If on nasal continuous positive airway pressure, respiratory therapy assess nares and determine need for appliance change or resting period.  Outcome: Progressing     Problem: ABCDS Injury Assessment  Goal: Absence of physical injury  Outcome: Progressing  Flowsheets (Taken 1/10/2024 1736)  Absence of Physical Injury: Implement safety measures based on patient assessment     Problem: Pain  Goal: Verbalizes/displays adequate comfort level or baseline comfort level  Outcome: Progressing  Flowsheets (Taken 1/10/2024 1736)  Verbalizes/displays adequate comfort level or baseline comfort level:   Encourage patient to monitor pain and request assistance   Administer  analgesics based on type and severity of pain and evaluate response   Consider cultural and social influences on pain and pain management   Assess pain using appropriate pain scale   Implement non-pharmacological measures as appropriate and evaluate response   Notify Licensed Independent Practitioner if interventions unsuccessful or patient reports new pain     Problem: Respiratory - Adult  Goal: Achieves optimal ventilation and oxygenation  Outcome: Progressing  Flowsheets (Taken 1/10/2024 1736)  Achieves optimal ventilation and oxygenation:   Assess for changes in respiratory status   Position to facilitate oxygenation and minimize respiratory effort   Initiate smoking cessation protocol as indicated   Assess the need for suctioning and aspirate as needed   Respiratory therapy support as indicated   Assess and instruct to report shortness of breath or any respiratory difficulty   Oxygen supplementation based on oxygen saturation or arterial blood gases   Encourage broncho-pulmonary hygiene including cough, deep breathe, incentive spirometry   Assess for changes in mentation and behavior     Problem: Gastrointestinal - Adult  Goal: Minimal or absence of nausea and vomiting  Outcome: Progressing  Flowsheets (Taken 1/10/2024 1736)  Minimal or absence of nausea and vomiting:   Administer IV fluids as ordered to ensure adequate hydration   Nasogastric tube to low intermittent suction as ordered   Provide nonpharmacologic comfort measures as appropriate   Nutrition consult to assist patient with adequate nutrition and appropriate food choices   Administer ordered antiemetic medications as needed   Maintain NPO status until nausea and vomiting are resolved   Advance diet as tolerated, if ordered  Goal: Maintains or returns to baseline bowel function  Outcome: Progressing  Flowsheets (Taken 1/10/2024 1736)  Maintains or returns to baseline bowel function:   Assess bowel function   Administer IV fluids as ordered to ensure

## 2024-01-10 NOTE — PROGRESS NOTES
[facility] Nurse  Progress Note       Grace Gonzalez  AGE: 78 y.o.   GENDER: female  : 1945  UNIT: 4K-24/024-A  TODAY'S DATE:  1/10/2024  ADMISSION DATE: 2024  7:02 PM    Summary:      Consult received for Stage II to right buttocks and stage i to surrounding area POA. Documentation reviewed. Staff documenting buttocks wound, no description noted. Recommend staff to utilize [facility] Wound Care order sets. See below. If wound evolves during hospital admission, please call.     Plan of Care:   [x]  Turn and reposition every 2 hours while in bed.     [x] Float heels off of bed with pillows under calves.     [x] Heel protective boots (heel medix boots) at all times while in bed.     [] Sacral foam dressing to sacrococcygeal area. Use skin barrier film prior to placement. Inspect and re-secure every shift. Change every other day and as needed if loose or soiled. Discontinue sacral foam if repeatedly soiled by incontinence.     [x] Apply Triad cream twice daily and as needed after incontinent episodes.     [] Perform routine incontinence care with use of foam cleanser.     [x] Use single layer moisture wicking underpad.     [x] Use wedges to offload patient.     [x] Keep the head of the bed below 30 degrees, unless contraindicated.     [x] Pressure reducing chair cushion while up to chair. Reposition every hour while in chair and limit chair time to 2 hour intervals.     [x] Encourage good nutritional intake and fluids. Consult dietician if needed.    How to: [facility] Orders         Type WOUND in order set search bar.  Right click Wound Care Orders, select add to favorites.  Right click GEN George Scale Focused, select add to favorites.        Check GEN George Scale Focus and Wound Care Orders.  Select appropriate orders.  Sign orders as Per Protocol: No Cosign Required.   These are independent nursing orders.

## 2024-01-10 NOTE — PROGRESS NOTES
Vest therapy not done-- pt c/o pain in her back and states \"please do not move me\"-- due to pain.  Pt has strong congested cough- coughing up and out

## 2024-01-10 NOTE — PROGRESS NOTES
Pharmacy Medication History Note      List of current medications patient is taking is complete.    Source of information: dispense history, patient, and patient's daughter    Changes made to medication list:  Medications removed (include reason, ex. therapy complete or physician discontinued):    Medications added/doses adjusted:  Added - Vit C  Added - Neuriva  Added - Ocuvite  Added - Nutrafol  Added - Vit D  Added - Vit E    Other notes (ex. Recent course of antibiotics, Coumadin dosing):  Denies use of other OTC or herbal medications.    Allergies reviewed - patient's daughter notes the lidocaine allergy to be a class effect, but notes patient is okay to receive medications in the class if pre-medicated with Benadryl    Electronically signed by Katie Mcpherson, PharmD Candidate on 1/10/2024 at 10:41 AM

## 2024-01-10 NOTE — PLAN OF CARE
Problem: Pain  Goal: Verbalizes/displays adequate comfort level or baseline comfort level  Outcome: Progressing     Problem: Respiratory - Adult  Goal: Achieves optimal ventilation and oxygenation  Outcome: Progressing  Flowsheets  Taken 1/10/2024 0228  Achieves optimal ventilation and oxygenation:   Assess for changes in respiratory status   Position to facilitate oxygenation and minimize respiratory effort   Oxygen supplementation based on oxygen saturation or arterial blood gases  Taken 1/9/2024 2000  Achieves optimal ventilation and oxygenation: Assess for changes in respiratory status     Problem: Gastrointestinal - Adult  Goal: Minimal or absence of nausea and vomiting  Outcome: Progressing  Flowsheets  Taken 1/10/2024 0228  Minimal or absence of nausea and vomiting:   Maintain NPO status until nausea and vomiting are resolved   Administer ordered antiemetic medications as needed  Taken 1/9/2024 2000  Minimal or absence of nausea and vomiting:   Maintain NPO status until nausea and vomiting are resolved   Administer ordered antiemetic medications as needed     Problem: Gastrointestinal - Adult  Goal: Maintains or returns to baseline bowel function  Outcome: Progressing  Flowsheets (Taken 1/10/2024 0228)  Maintains or returns to baseline bowel function:   Assess bowel function   Administer ordered medications as needed   Encourage mobilization and activity     Problem: Genitourinary - Adult  Goal: Urinary catheter remains patent  Outcome: Progressing  Flowsheets (Taken 1/10/2024 0228)  Urinary catheter remains patent: Assess patency of urinary catheter

## 2024-01-10 NOTE — PROGRESS NOTES
Hospitalist Progress Note      Patient:  Grace Gonzalez    Unit/Bed:4K-24/024-A  YOB: 1945  MRN: [id number]   Acct: [id number]     PCP: Diomedes Hall MD  Date of Admission: 1/9/2024         Assessment and Plan:        Acute hypoxic respiratory failure: Hospital-acquired pneumonia  On HFNC 90% FiO2 60L/min  Moderate left pleural effusion with collapse of left upper and lower lobs on CT 1/6/2024- at [place]  Consult pulmonology for thoracentesis vs bronchoscopy  Continue cefepime IV q 12 hours  1/10-seen by pulmonary had thoracentesis today will await cultures, cytology, Gram stain-already on empiric antibiotics-wean high flow as able  Small bowel obstruction status post exploratory lap at [facility]:   POD #10 exploratory laparatomy  Still no flatus or bowel movement  Has been NPO, NG clamped in place  On TPN - will continue current orders-has abdominal binder on will follow-up with general surgery at [facility] once discharged  Chronic joint pain:   Long term  No outpatient work up prior  Pain management  Outpatient follow up at discharge  Acute hypovolemic hyponatremia:-Resolved currently on TPN  Trend with daily labs    CC:  acute respiratory failure    HPI: Patient transferred from [facility] for further treatment of acute hypoxic respiratory failure.  The patient was admitted at [facility] 12/30/2023 for a small bowel obstruction and had exploratory laparatomy 12/31/2023.  On 1/3/2024 patient developed hypoxia and required HFNC alternating with non rebreather mask. Patient was in the ICU and required vasopressor support 1/4/2024 through 1/7/2024.  Patient's blood pressures are improved.    Patient had CT of chest 1/8/2024 that demonstrated left pleural effusion.  Patient was transferred for pulmonology consult.    Subjective:- (Last 24 hours)    Chronically ill looking severely malnourished, on currently on TPN had extensive discussion with daughter at  99 mg/dL    BUN 21 8 - 23 mg/dL    Creatinine 0.3 (L) 0.5 - 0.9 mg/dL    Est, Glom Filt Rate >60 >60 mL/min/1.73m2    Bun/Cre Ratio 70 (H) 9 - 20    Calcium 8.4 (L) 8.6 - 10.4 mg/dL   Magnesium    Collection Time: 01/08/24  5:20 AM   Result Value Ref Range    Magnesium 2.2 1.6 - 2.6 mg/dL   Hepatic Function Panel    Collection Time: 01/08/24  5:20 AM   Result Value Ref Range    Albumin 2.5 (L) 3.5 - 5.2 g/dL    Alkaline Phosphatase 59 35 - 104 U/L    ALT 16 5 - 33 U/L    AST 21 <32 U/L    Total Bilirubin 0.5 0.3 - 1.2 mg/dL    Bilirubin, Direct <0.1 <0.3 mg/dL    Bilirubin, Indirect Can not be calculated 0.0 - 1.0 mg/dL    Total Protein 5.4 (L) 6.4 - 8.3 g/dL    Albumin/Globulin Ratio 0.9 (L) 1.0 - 2.5   Echo (TTE) complete (PRN contrast/bubble/strain/3D)    Collection Time: 01/08/24  8:59 AM   Result Value Ref Range    IVSd 0.9 0.6 - 0.9 cm    IVSs 1.1 cm    LVIDd 3.9 3.9 - 5.3 cm    LVIDs 2.7 cm    LVPWd 1.0 (A) 0.6 - 0.9 cm    LVPWs 1.2 cm    EF BP 71 55 - 100 %    LV EDV BP 40 (A) 56 - 104 mL    LV EDV Index BP 27 mL/m2    LV EDV A2C 53 mL    LV EDV A4C 25 mL    LV ESV BP 11 (A) 19 - 49 mL    LV ESV Index BP 7 mL/m2    LV ESV A2C 16 mL    LV ESV A4C 8 mL    LV E' Lateral Velocity 12 cm/s    LA Diameter 3.0 cm    LA Major Axis 4.3 cm    LA Volume A/L 37 mL    LA Volume BP 33 22 - 52 mL    LA Volume Index A/L 25 16 - 34 mL/m2    LA Volume MOD A2C 40 22 - 52 mL    LA Volume MOD A4C 24 22 - 52 mL    AV Cusp Mmode 1.8 cm    AV AT 53.87 ms    AV Mean Gradient 4 mmHg    AV Mean Velocity 1.0 m/s    AV Peak Velocity 1.5 m/s    AV VTI 23.4 cm    MV E Wave Deceleration Time 382.8 ms    MV A Velocity 0.52 m/s    MV E Velocity 0.53 m/s    PV Max Velocity 0.9 m/s    TR Max Velocity 2.70 m/s    Aortic Root 2.8 cm    Body Surface Area 1.47 m2    Fractional Shortening 2D 31 28 - 44 %    LV ESV Index A4C 5 mL/m2    LV EDV Index A4C 17 mL/m2    LV ESV Index A2C 11 mL/m2    LV EDV Index A2C 36 mL/m2    LVIDd Index 2.62 cm/m2

## 2024-01-10 NOTE — CARE COORDINATION
Case Management Assessment  Initial Evaluation    Date/Time of Evaluation: 1/10/2024 11:02 AM  Assessment Completed by: Marco Clark RN    If patient is discharged prior to next notation, then this note serves as note for discharge by case management.    Patient Name: Grace Gonzalez                   YOB: 1945  Diagnosis: Acute respiratory failure (HCC) [J96.00]                   Date / Time: 1/9/2024  7:02 PM  Location: [address] [place] [address]     Patient Admission Status: Inpatient   Readmission Risk Low 0-14, Mod 15-19), High > 20: Readmission Risk Score: 17.1    Current PCP: Diomedes Hall MD  PCP verified by CM? Yes    Chart Reviewed: Yes      History Provided by: Medical Record  Patient Orientation: Alert and Oriented    Patient Cognition: Alert    Hospitalization in the last 30 days (Readmission):  No  From [facility]  If yes, Readmission Assessment in  Navigator will be completed.    Advance Directives:      Code Status: DNR-CCA   Patient's Primary Decision Maker is: Legal Next of Kin    Primary Decision Maker: Johana Paulson - Child - [phone]    Discharge Planning:    Patient lives with: Alone Type of Home: House  Primary Care Giver: Self  Patient Support Systems include: Children   Current Financial resources: Medicare  Current community resources: None  Current services prior to admission: Durable Medical Equipment            Current DME: Cane            Type of Home Care services:  None    ADLS  Prior functional level: Independent in ADLs/IADLs  Current functional level: Independent in ADLs/IADLs    Family can provide assistance at DC: Yes  Would you like Case Management to discuss the discharge plan with any other family members/significant others, and if so, who? No  Plans to Return to Present Housing: Yes  Other Identified Issues/Barriers to RETURNING to current housing: no  Potential Assistance needed at discharge: N/A            Potential DME: Walker  Patient expects

## 2024-01-10 NOTE — PROGRESS NOTES
IV team to room to change pt's PICC line dressing.  Pt's daughter at bedside - requested that dressing not be changed at this time.  States it causes her mother too much pain.  Explained to daughter our policy of changing dressings after admitting from outside facility.  Also noted that PICC was placed 7 days ago and explained the standard of changing a PICC dressing every 7 days for infection prevention measures.  Daughter still requesting that dressing be left and pt agreed.  Dressing did appear to be clean, dry and intact at this time.  Told daughter and pt that IV team would follow up to continue to assess dressing.  Notified primary RN Sid.

## 2024-01-10 NOTE — PROGRESS NOTES
Pharmacy Consult for TPN/PPN Initiation    Indication for TPN: SBO, NPO  Ordering Provider: Mallory GALLEGOS          IV Access: central  Dosing weight: 43 kg  Current insulin regimen: none - will start Humalog low sliding scale with chemsticks q6h  Maintenance fluid: none    Assessment/ Plan:  Patient came from [facility] on Clinimix E 5/15 @ 58.3 ml/hr and lipids @14.6 ml/hr  Will convert to 3-in-1 TPN. Electrolytes based on amount receiving from Clinimix E  Begin 3-in-1 TPN today with the ingredients listed below:    Date 1/10/24   Total kcal/kg 30   Total kcal 1290   Protein g/kg 1.5   Protein g 64.5   Protein kcal (4 kcal/g) 258   Lipid % 20   Lipid g 25.8   Lipid kcal (10 kcal/g) 258   Dextrose g 228   Dextrose kcal (3.4 kcal/g) 774   Infusion Rate (mL/hr) 80   Na Acetate (mEq) none   Na Chloride (mEq) 140   Na Phos (mmol)  (3 mmol = 4 mEq Na) none   Total Na (mEq) 140   K Acetate (mEq) 30   K Chloride (mEq) none   K Phos (mmol)  (15 mmol = 22 mEq K) 15   Total K (mEq) 52   Ca Gluconate (mEq)  (1 g = 4.65 mEq) 4.65   Mag Sulfate (mEq)  (1 g = 8.12 mEq) 6   Multivitamin (mL) 10   Trace Elements (mL) 1     Will start Pepcid 20mg IV BID since not on PPI or H2 antagonist    Electrolyte Replacement:   none    Monitoring:  BMP, Mag, iCal, & Phos ordered for tomorrow with AM labs.    Pharmacy will continue to follow daily. Thank you for the consult.    Jennifer Greene, PharmD, BCPS 1/10/2024 11:55 AM

## 2024-01-10 NOTE — PROGRESS NOTES
Comprehensive Nutrition Assessment    Type and Reason for Visit:  Initial, Consult (TPN, unplanned wt loss, poor po/appetite)    Nutrition Recommendations/Plan:   When next  TPN bag is made suggest switch  to 3 in 1  using dose wt: 43kgm 30kcals/kgm, 1.5 grams protein/kgm, 20% kcals from lipids. Discussed with pharmacy.  Suggest Mg level.   Monitor NPO status, labs, TPN  & wt     Malnutrition Assessment:  Malnutrition Status:  Severe malnutrition (01/10/24 1055)    Context:  Acute Illness     Findings of the 6 clinical characteristics of malnutrition:  Energy Intake:  50% or less of estimated energy requirements for 5 or more days  Weight Loss:  No significant weight loss (limited data ,  however daughter mentions pt used to weigh ~130# several years ago)     Body Fat Loss:  Moderate body fat loss Orbital, Fat Overlying Ribs, Buccal region   Muscle Mass Loss:  Moderate muscle mass loss Clavicles (pectoralis & deltoids), Temples (temporalis), Hand (interosseous)  Fluid Accumulation:  Unable to assess     Strength:  Not Performed    Nutrition Assessment:     Pt. severely malnourished AEB criteria listed above.  At risk for further nutritional compromise r/t  admit with Acute Hypoxic Respiratory Failure, Hyponatremia, SBO likely adhesion from prior  & hysterectomy , need for PN, Healthcare Acquired Pneumonia and underlying medical condition COPD,  COPD, Pneumonia).       Nutrition Related Findings:        Pt. Report/Treatments/Miscellaneous: Pt seen, came from [facility] on Clinimix 5/15 infusing 58.3ml/hr & fat emulsion 20% infusing 14.6ml/hr 12 hrs/day. Pt appears thin, wearing high flow. Poor appetite. Daughter mentions pt's intake has been declining for over a year. Per pt's diet hx, appears to be having poor intake less than 50%, was not taking any ONS at home, tends to skip lunch,   & reports she doesn't like protein shakes. Limited date re: wt trends available. Daughter mentions pt used

## 2024-01-11 LAB
ANION GAP SERPL CALC-SCNC: 7 MEQ/L (ref 8–16)
BACTERIA SPEC AEROBE CULT: NORMAL
BUN SERPL-MCNC: 15 MG/DL (ref 7–22)
CA-I BLD ISE-SCNC: 1.13 MMOL/L (ref 1.12–1.32)
CALCIUM SERPL-MCNC: 8.3 MG/DL (ref 8.5–10.5)
CHLORIDE SERPL-SCNC: 102 MEQ/L (ref 98–111)
CO2 SERPL-SCNC: 25 MEQ/L (ref 23–33)
CREAT SERPL-MCNC: 0.3 MG/DL (ref 0.4–1.2)
GFR SERPL CREATININE-BSD FRML MDRD: > 60 ML/MIN/1.73M2
GLUCOSE BLD STRIP.AUTO-MCNC: 131 MG/DL (ref 70–108)
GLUCOSE BLD STRIP.AUTO-MCNC: 132 MG/DL (ref 70–108)
GLUCOSE BLD STRIP.AUTO-MCNC: 158 MG/DL (ref 70–108)
GLUCOSE BLD STRIP.AUTO-MCNC: 159 MG/DL (ref 70–108)
GLUCOSE SERPL-MCNC: 131 MG/DL (ref 70–108)
MAGNESIUM SERPL-MCNC: 2.2 MG/DL (ref 1.6–2.4)
PHOSPHATE SERPL-MCNC: 2.9 MG/DL (ref 2.4–4.7)
POTASSIUM SERPL-SCNC: 4.1 MEQ/L (ref 3.5–5.2)
SODIUM SERPL-SCNC: 134 MEQ/L (ref 135–145)

## 2024-01-11 PROCEDURE — 82330 ASSAY OF CALCIUM: CPT

## 2024-01-11 PROCEDURE — 6360000002 HC RX W HCPCS: Performed by: PHYSICIAN ASSISTANT

## 2024-01-11 PROCEDURE — 82948 REAGENT STRIP/BLOOD GLUCOSE: CPT

## 2024-01-11 PROCEDURE — 2500000003 HC RX 250 WO HCPCS: Performed by: PHYSICIAN ASSISTANT

## 2024-01-11 PROCEDURE — 99232 SBSQ HOSP IP/OBS MODERATE 35: CPT | Performed by: INTERNAL MEDICINE

## 2024-01-11 PROCEDURE — 94761 N-INVAS EAR/PLS OXIMETRY MLT: CPT

## 2024-01-11 PROCEDURE — 6370000000 HC RX 637 (ALT 250 FOR IP): Performed by: INTERNAL MEDICINE

## 2024-01-11 PROCEDURE — 83735 ASSAY OF MAGNESIUM: CPT

## 2024-01-11 PROCEDURE — 97530 THERAPEUTIC ACTIVITIES: CPT

## 2024-01-11 PROCEDURE — 97166 OT EVAL MOD COMPLEX 45 MIN: CPT

## 2024-01-11 PROCEDURE — 36415 COLL VENOUS BLD VENIPUNCTURE: CPT

## 2024-01-11 PROCEDURE — 80048 BASIC METABOLIC PNL TOTAL CA: CPT

## 2024-01-11 PROCEDURE — A4216 STERILE WATER/SALINE, 10 ML: HCPCS | Performed by: PHYSICIAN ASSISTANT

## 2024-01-11 PROCEDURE — 2060000000 HC ICU INTERMEDIATE R&B

## 2024-01-11 PROCEDURE — 94640 AIRWAY INHALATION TREATMENT: CPT

## 2024-01-11 PROCEDURE — 2580000003 HC RX 258: Performed by: PHYSICIAN ASSISTANT

## 2024-01-11 PROCEDURE — 84100 ASSAY OF PHOSPHORUS: CPT

## 2024-01-11 PROCEDURE — 99233 SBSQ HOSP IP/OBS HIGH 50: CPT | Performed by: INTERNAL MEDICINE

## 2024-01-11 RX ORDER — SENNA AND DOCUSATE SODIUM 50; 8.6 MG/1; MG/1
1 TABLET, FILM COATED ORAL NIGHTLY
Status: DISCONTINUED | OUTPATIENT
Start: 2024-01-11 | End: 2024-01-12 | Stop reason: HOSPADM

## 2024-01-11 RX ORDER — BISACODYL 5 MG/1
5 TABLET, DELAYED RELEASE ORAL DAILY PRN
Status: DISCONTINUED | OUTPATIENT
Start: 2024-01-11 | End: 2024-01-12 | Stop reason: HOSPADM

## 2024-01-11 RX ADMIN — CALCIUM GLUCONATE: 98 INJECTION, SOLUTION INTRAVENOUS at 17:56

## 2024-01-11 RX ADMIN — CEFEPIME 2000 MG: 2 INJECTION, POWDER, FOR SOLUTION INTRAVENOUS at 00:17

## 2024-01-11 RX ADMIN — SODIUM CHLORIDE, PRESERVATIVE FREE 10 ML: 5 INJECTION INTRAVENOUS at 20:59

## 2024-01-11 RX ADMIN — IPRATROPIUM BROMIDE AND ALBUTEROL SULFATE 1 DOSE: .5; 3 SOLUTION RESPIRATORY (INHALATION) at 08:05

## 2024-01-11 RX ADMIN — ONDANSETRON 4 MG: 2 INJECTION INTRAMUSCULAR; INTRAVENOUS at 18:12

## 2024-01-11 RX ADMIN — MORPHINE SULFATE 1 MG: 2 INJECTION, SOLUTION INTRAMUSCULAR; INTRAVENOUS at 08:33

## 2024-01-11 RX ADMIN — IPRATROPIUM BROMIDE AND ALBUTEROL SULFATE 1 DOSE: .5; 3 SOLUTION RESPIRATORY (INHALATION) at 15:28

## 2024-01-11 RX ADMIN — MORPHINE SULFATE 2 MG: 2 INJECTION, SOLUTION INTRAMUSCULAR; INTRAVENOUS at 18:09

## 2024-01-11 RX ADMIN — IPRATROPIUM BROMIDE AND ALBUTEROL SULFATE 1 DOSE: .5; 3 SOLUTION RESPIRATORY (INHALATION) at 11:25

## 2024-01-11 RX ADMIN — CEFEPIME 2000 MG: 2 INJECTION, POWDER, FOR SOLUTION INTRAVENOUS at 12:04

## 2024-01-11 RX ADMIN — MORPHINE SULFATE 1 MG: 2 INJECTION, SOLUTION INTRAMUSCULAR; INTRAVENOUS at 21:52

## 2024-01-11 RX ADMIN — MORPHINE SULFATE 1 MG: 2 INJECTION, SOLUTION INTRAMUSCULAR; INTRAVENOUS at 00:28

## 2024-01-11 RX ADMIN — MORPHINE SULFATE 1 MG: 2 INJECTION, SOLUTION INTRAMUSCULAR; INTRAVENOUS at 11:49

## 2024-01-11 RX ADMIN — ONDANSETRON 4 MG: 2 INJECTION INTRAMUSCULAR; INTRAVENOUS at 03:29

## 2024-01-11 RX ADMIN — ENOXAPARIN SODIUM 30 MG: 100 INJECTION SUBCUTANEOUS at 08:31

## 2024-01-11 RX ADMIN — FAMOTIDINE 20 MG: 10 INJECTION, SOLUTION INTRAVENOUS at 20:59

## 2024-01-11 RX ADMIN — SODIUM CHLORIDE, PRESERVATIVE FREE 10 ML: 5 INJECTION INTRAVENOUS at 18:12

## 2024-01-11 RX ADMIN — FAMOTIDINE 20 MG: 10 INJECTION, SOLUTION INTRAVENOUS at 08:32

## 2024-01-11 RX ADMIN — MORPHINE SULFATE 1 MG: 2 INJECTION, SOLUTION INTRAMUSCULAR; INTRAVENOUS at 03:27

## 2024-01-11 RX ADMIN — SODIUM CHLORIDE, PRESERVATIVE FREE 10 ML: 5 INJECTION INTRAVENOUS at 11:54

## 2024-01-11 RX ADMIN — SODIUM CHLORIDE, PRESERVATIVE FREE 10 ML: 5 INJECTION INTRAVENOUS at 18:11

## 2024-01-11 RX ADMIN — CEFEPIME 2000 MG: 2 INJECTION, POWDER, FOR SOLUTION INTRAVENOUS at 23:59

## 2024-01-11 RX ADMIN — ONDANSETRON 4 MG: 2 INJECTION INTRAMUSCULAR; INTRAVENOUS at 11:49

## 2024-01-11 RX ADMIN — MORPHINE SULFATE 2 MG: 2 INJECTION, SOLUTION INTRAMUSCULAR; INTRAVENOUS at 23:59

## 2024-01-11 ASSESSMENT — PAIN - FUNCTIONAL ASSESSMENT
PAIN_FUNCTIONAL_ASSESSMENT: PREVENTS OR INTERFERES SOME ACTIVE ACTIVITIES AND ADLS
PAIN_FUNCTIONAL_ASSESSMENT: ACTIVITIES ARE NOT PREVENTED
PAIN_FUNCTIONAL_ASSESSMENT: ACTIVITIES ARE NOT PREVENTED
PAIN_FUNCTIONAL_ASSESSMENT: PREVENTS OR INTERFERES SOME ACTIVE ACTIVITIES AND ADLS
PAIN_FUNCTIONAL_ASSESSMENT: ACTIVITIES ARE NOT PREVENTED

## 2024-01-11 ASSESSMENT — PAIN SCALES - GENERAL
PAINLEVEL_OUTOF10: 5
PAINLEVEL_OUTOF10: 7
PAINLEVEL_OUTOF10: 10
PAINLEVEL_OUTOF10: 3
PAINLEVEL_OUTOF10: 5
PAINLEVEL_OUTOF10: 5
PAINLEVEL_OUTOF10: 7
PAINLEVEL_OUTOF10: 0
PAINLEVEL_OUTOF10: 5
PAINLEVEL_OUTOF10: 0
PAINLEVEL_OUTOF10: 6

## 2024-01-11 ASSESSMENT — PAIN DESCRIPTION - LOCATION
LOCATION: ABDOMEN
LOCATION: ABDOMEN
LOCATION: ABDOMEN;BACK
LOCATION: ABDOMEN;GENERALIZED
LOCATION: ABDOMEN;BACK
LOCATION: ABDOMEN
LOCATION: ABDOMEN

## 2024-01-11 ASSESSMENT — PAIN DESCRIPTION - ORIENTATION
ORIENTATION: RIGHT;LEFT;MID
ORIENTATION: MID
ORIENTATION: MID
ORIENTATION: RIGHT;LEFT;MID

## 2024-01-11 ASSESSMENT — PAIN DESCRIPTION - DESCRIPTORS
DESCRIPTORS: ACHING
DESCRIPTORS: ACHING;SHOOTING;SORE;SPASM
DESCRIPTORS: ACHING

## 2024-01-11 ASSESSMENT — PAIN SCALES - WONG BAKER
WONGBAKER_NUMERICALRESPONSE: 2
WONGBAKER_NUMERICALRESPONSE: 2

## 2024-01-11 ASSESSMENT — PAIN DESCRIPTION - PAIN TYPE: TYPE: SURGICAL PAIN

## 2024-01-11 ASSESSMENT — PAIN DESCRIPTION - FREQUENCY: FREQUENCY: CONTINUOUS

## 2024-01-11 ASSESSMENT — PAIN DESCRIPTION - ONSET: ONSET: ON-GOING

## 2024-01-11 NOTE — CARE COORDINATION
CM Note  [facility] can accept in am; collaborated w Attending, [facility] Liaison, patient  Electronically signed by Marco Clark RN on 1/11/2024 at 1:16 PM

## 2024-01-11 NOTE — PROGRESS NOTES
Pharmacy Consult for TPN/PPN Monitoring & Adjustment  IV Access: central      Dosing weight: 43 kg  Current insulin regimen: low sliding scale  Diet (excluding TPN): NPO  Maintenance fluid: none    Plan:  See components of tonight's TPN bag in the table below:  ate 1/10/24 1/11/2024   Total kcal/kg 30 35   Total kcal 1290 1505   Protein g/kg 1.5 1.7   Protein g 64.5 73.1   Protein kcal (4 kcal/g) 258 292.4   Lipid % 20 30   Lipid g 25.8 45.2   Lipid kcal (10 kcal/g) 258 451.5   Dextrose g 228 223.9   Dextrose kcal (3.4 kcal/g) 774 761.1   Infusion Rate (mL/hr) 80 80   Na Acetate (mEq) none --   Na Chloride (mEq) 140 140    Na Phos (mmol)  (3 mmol = 4 mEq Na) none --   Total Na (mEq) 140 140   K Acetate (mEq) 30 30   K Chloride (mEq) none --   K Phos (mmol)  (15 mmol = 22 mEq K) 15 15   Total K (mEq) 52 52   Ca Gluconate (mEq)  (1 g = 4.65 mEq) 4.65 4.65     Mag Sulfate (mEq)  (1 g = 8.12 mEq) 6 0   Multivitamin (mL) 10 10   Trace Elements (mL) 1 1      Summary of changes for tonight's TPN:   Remove magnesium     Electrolyte Replacement:   none    Monitoring:  BMP, Mag, iCal, & Phos ordered for tomorrow with AM labs.    Pharmacy will continue to follow daily. Thank you for the consult.    Christiana Nieto, PharmD, BCPS  1/11/2024  10:26 AM

## 2024-01-11 NOTE — PLAN OF CARE
Problem: Respiratory - Adult  Goal: Achieves optimal ventilation and oxygenation  1/11/2024 0814 by Asia Dennison, RCP  Outcome: Progressing

## 2024-01-11 NOTE — PLAN OF CARE
Absence of physical injury  Outcome: Progressing     Problem: Pain  Goal: Verbalizes/displays adequate comfort level or baseline comfort level  Outcome: Progressing  Flowsheets (Taken 1/10/2024 1736 by Kathleen Gutierrez, RN)  Verbalizes/displays adequate comfort level or baseline comfort level:   Encourage patient to monitor pain and request assistance   Administer analgesics based on type and severity of pain and evaluate response   Consider cultural and social influences on pain and pain management   Assess pain using appropriate pain scale   Implement non-pharmacological measures as appropriate and evaluate response   Notify Licensed Independent Practitioner if interventions unsuccessful or patient reports new pain  Note: Patient denies pain currently. Reminded patient to report any pain, pressure, or shortness of breath to the nurse.  Will continue to monitor.         Problem: Respiratory - Adult  Goal: Achieves optimal ventilation and oxygenation  1/11/2024 0814 by Asia Dennison RCP  Outcome: Progressing  1/11/2024 0625 by Jennifer Florez RCP  Outcome: Progressing     Problem: Gastrointestinal - Adult  Goal: Minimal or absence of nausea and vomiting  Outcome: Progressing     Problem: Skin/Tissue Integrity - Adult  Goal: Incisions, wounds, or drain sites healing without S/S of infection  Outcome: Progressing  Flowsheets (Taken 1/10/2024 1736 by Kathleen Gutierrez, RN)  Incisions, Wounds, or Drain Sites Healing Without Sign and Symptoms of Infection:   TWICE DAILY: Assess and document dressing/incision, wound bed, drain sites and surrounding tissue   Implement wound care per orders  Note: Ongoing assessment & interventions provided throughout shift.  Skin assessments provided.  Encouraging/assisting patient to turn as needed.    Goal: Oral mucous membranes remain intact  Outcome: Progressing     Problem: Nutrition Deficit:  Goal: Optimize nutritional status  Outcome: Progressing  Flowsheets (Taken 1/11/2024

## 2024-01-11 NOTE — PROGRESS NOTES
[facility]      Patient - Grace Gonzalez   MRN -  [id number]   [facility] # - [id number]   - 1945      Date of Admission -  2024  7:02 PM  Date of evaluation -  2024  Room - -24/024-A   Hospital Day - 2  Consulting - Darnell Young MD Primary Care Physician - Diomedes Hall MD     Problem List      Active Hospital Problems    Diagnosis Date Noted    Pleural effusion, left [J90] 01/10/2024    Acute respiratory failure (HCC) [J96.00] 2024    Tobacco abuse disorder / long term smoker [Z72.0] 2024    Severe malnutrition (HCC) [E43] 2024    Small bowel obstruction (HCC) likely adhesion related from prior  and hysterectomy [K56.609] 2023     Reason for Consult    Acute hypoxemic respiratory failure with large left sided pleural effusion  Past 24 hours   The patient has improved significantly since thoracocentesis yesterday. She is no long SOB and her cough has improved significantly. Her respiratory effort has improved, and she is now on room air. She still complains of generalized aches and pains.    PMHx   Past Medical History      Diagnosis Date    Arthritis     Asthma     Blood circulation, collateral     Cancer (HCC)     COPD (chronic obstructive pulmonary disease) (HCC)     HAP (hospital-acquired pneumonia) 2024    Pneumonia       Past Surgical History        Procedure Laterality Date    BREAST SURGERY       SECTION      EYE SURGERY      LAPAROTOMY N/A 2023    LAPAROTOMY EXPLORATORY performed by Brendan Dumont MD at [facility] OR    SKIN BIOPSY      RHODA AND BSO (CERVIX REMOVED)       Meds    Current Medications    sennosides-docusate sodium  1 tablet Oral Nightly    ipratropium 0.5 mg-albuterol 2.5 mg  1 Dose Inhalation Q4H WA RT    insulin lispro  0-4 Units SubCUTAneous Q6H    famotidine (PEPCID) 20 mg in sodium chloride (PF) 0.9 % 10 mL injection  20 mg IntraVENous Q12H    calcitonin  1 spray  Alternating Nares Daily    cefepime  2,000 mg IntraVENous Q12H    sodium chloride flush  5-40 mL IntraVENous 2 times per day    enoxaparin  30 mg SubCUTAneous Daily     bisacodyl, glucose, dextrose bolus **OR** dextrose bolus, glucagon (rDNA), dextrose, sodium chloride flush, sodium chloride, potassium chloride **OR** potassium alternative oral replacement **OR** potassium chloride, magnesium sulfate, ondansetron **OR** ondansetron, polyethylene glycol, acetaminophen **OR** acetaminophen, morphine **OR** morphine  IV Drips/Infusions   PN-Adult  3 IN 1 Central Line (Custom)      dextrose      PN-Adult  3 IN 1 Central Line (Custom) 80 mL/hr at 01/10/24 2232    sodium chloride Stopped (01/10/24 1659)     Home Medications  Medications Prior to Admission: Multiple Vitamins-Minerals (OCUVITE ADULT 50+) CAPS, Take 1 capsule by mouth daily  Cobalamin Combinations (NEURIVA PLUS PO), Take 1 capsule by mouth daily  vitamin D (CHOLECALCIFEROL) 25 MCG (1000 UT) TABS tablet, Take 1 tablet by mouth daily  Ascorbic Acid (VITAMIN C) 250 MG tablet, Take 1 tablet by mouth daily  vitamin E 400 UNIT capsule, Take 1 capsule by mouth daily  UNABLE TO FIND, Take 4 capsules by mouth daily Nutrafol supplement  calcitonin (MIACALCIN) 200 UNIT/ACT nasal spray, 1 spray by Nasal route daily  VENTOLIN  (90 Base) MCG/ACT inhaler, Inhale 2 puffs into the lungs 4 times daily  Diet    Diet NPO  PN-Adult  3 IN 1 Central Line (Custom)  PN-Adult  3 IN 1 Central Line (Custom)  Allergies    Lidocaine, Adhesive tape, Aspirin, Codeine, Iodinated contrast media, and Motrin [ibuprofen]  Social History     Social History     Socioeconomic History    Marital status: Single     Spouse name: Not on file    Number of children: Not on file    Years of education: Not on file    Highest education level: Not on file   Occupational History    Not on file   Tobacco Use    Smoking status: Every Day     Current packs/day: 1.00     Average packs/day: 1 pack/day for

## 2024-01-11 NOTE — PROGRESS NOTES
[facility]  INPATIENT OCCUPATIONAL THERAPY  [facility] ICU STEPDOWN TELEMETRY 4K  EVALUATION    Time:   Time In: 1314  Time Out: 1350  Timed Code Treatment Minutes: 24 Minutes  Minutes: 36          Date: 2024  Patient Name: Grace Gonzalez,   Gender: female      MRN: [id number]  : 1945  (78 y.o.)  Referring Practitioner: Darnell Young MD  Diagnosis: Small bowel obstruction  Additional Pertinent Hx: Patient transferred from [facility] for further treatment of acute hypoxic respiratory failure.  The patient was admitted at [facility] 2023 for a small bowel obstruction and had exploratory laparatomy 2023.  On 1/3/2024 patient developed hypoxia and required HFNC alternating with non rebreather mask. Patient was in the ICU and required vasopressor support 2024 through 2024.  Patient's blood pressures are improved.    Patient had CT of chest 2024 that demonstrated left pleural effusion. Small bowel obstruction status post exploratory lap at [facility]. On TPN.    Restrictions/Precautions:  Restrictions/Precautions: NPO, General Precautions, Fall Risk, Up as Tolerated  Right Lower Extremity Weight Bearing: Weight Bearing As Tolerated    Subjective  Chart Reviewed: Yes    Subjective: Nurse approved OT eval. Pt in bed on OT arrival, reporting a headache. Agreeable to evaluation. A+Ox4.    Pain: pain in upper back, left side    Vitals: Vitals not assessed per clinical judgement, see nursing flowsheet    Social/Functional History:  Lives With: Alone  Type of Home: House  Home Layout: Two level  Home Access: Level entry  Home Equipment: Cane   Bathroom Shower/Tub: Tub/Shower unit  Bathroom Toilet: Standard       ADL Assistance: Independent  Homemaking Assistance: Independent  Ambulation Assistance: Independent  Transfer Assistance: Independent    Active : Yes  Mode of Transportation: SUV  Occupation: Retired  Type of Occupation: retired         VISION:WFL    HEARING:  WFL    COGNITION: Decreased Insight. Education required on importance of out of bed activity. Pt is agreeable with education.     RANGE OF MOTION:  Bilateral Upper Extremity:  WFL    STRENGTH:  Bilateral Upper Extremity:  Impaired - grossly impaired     SENSATION:   WFL    ADL:   Upper Extremity Dressing: Maximum Assistance.     Footwear Management: Dependent.     Toileting: Dependent.  Calderon catheter in place .    BALANCE:  Sitting Balance:  Stand By Assistance.   Standing Balance: Contact Guard Assistance, X 1, with cues for safety, with verbal cues. Pt tolerates ~2 minutes standing    BED MOBILITY:  Supine to Sit: Moderate Assistance, X 1, with head of bed raised, with rail increased time required. Pt anxious about pain with movement     TRANSFERS:  Sit to Stand:  Contact Guard Assistance.    Stand to Sit: Contact Guard Assistance, X 1, with increased time for completion, cues for hand placement.      FUNCTIONAL MOBILITY:  Assistive Device: Rolling Walker  Assist Level:  Contact Guard Assistance, X 1, with verbal cues , and with increased time for completion.   Distance:  from EOB to recliner chair       Activity Tolerance:  Patient tolerance of  treatment: Fair treatment tolerance      Assessment:  Assessment: Pt is 79 yo female presenting to [facility] for small bowel obstruction. Pt demo decrease strength/ endurance, dynamic balance, and safety awareness with tubing during ADLs and functional mobility/transfers. Pt would benefit frm skilled OT to increase srength, endurance, dynamic balance, and safety awareness during ADLs and functional mobility/transfer tasks.  Performance deficits / Impairments: Decreased functional mobility , Decreased safe awareness, Decreased balance, Decreased ADL status, Decreased endurance, Decreased high-level IADLs, Decreased strength  Prognosis: Good  REQUIRES OT FOLLOW-UP: Yes  Decision Making: Medium Complexity  Assistance / Modification: CGA ADLs/functional

## 2024-01-11 NOTE — PROGRESS NOTES
Hospitalist Progress Note      Patient:  Grace Gonzalez    Unit/Bed:4K-24/024-A  YOB: 1945  MRN: [id number]   Acct: [id number]     PCP: Diomedes Hall MD  Date of Admission: 1/9/2024         Assessment and Plan:        Acute hypoxic respiratory failure: Hospital-acquired pneumonia  On HFNC 90% FiO2 60L/min  Moderate left pleural effusion with collapse of left upper and lower lobs on CT 1/6/2024- at [place]  Consult pulmonology for thoracentesis vs bronchoscopy  Continue cefepime IV q 12 hours  1/10-seen by pulmonary had thoracentesis today will await cultures, cytology, Gram stain-already on empiric antibiotics-wean high flow as able  1/11-pleural effusion--exudative however Gram stain and cultures negative so far-currently on cefepime  Small bowel obstruction status post exploratory lap at [facility]:   POD #10 exploratory laparatomy  Still no flatus or bowel movement  Has been NPO, NG clamped in place  On TPN - will continue current orders-has abdominal binder on will follow-up with general surgery at [facility] once discharged  1/11-stool softeners added  Chronic joint pain:   Long term  No outpatient work up prior  Pain management  Outpatient follow up at discharge  Acute hypovolemic hyponatremia:-Resolved currently on TPN  Trend with daily labs  Severe malnourished-protein-on TPN    CC:  acute respiratory failure    HPI: Patient transferred from [facility] for further treatment of acute hypoxic respiratory failure.  The patient was admitted at [facility] 12/30/2023 for a small bowel obstruction and had exploratory laparatomy 12/31/2023.  On 1/3/2024 patient developed hypoxia and required HFNC alternating with non rebreather mask. Patient was in the ICU and required vasopressor support 1/4/2024 through 1/7/2024.  Patient's blood pressures are improved.    Patient had CT of chest 1/8/2024 that demonstrated left pleural effusion.  Patient was transferred for

## 2024-01-11 NOTE — PLAN OF CARE
Problem: Respiratory - Adult  Goal: Achieves optimal ventilation and oxygenation  1/11/2024 0625 by Jennifer Florez RCP  Outcome: Progressing   Patient and family mutually agree upon goal.

## 2024-01-11 NOTE — PROGRESS NOTES
IV team to room. Patient and daughter expressing concern about PICC line dressing being changed.Also voiced that lines were sluggish. Pt and daughter are questioning if PICC dressing change needs to be done as patient has thin skin and has been known to have sensitivities to tape. Explain important of dressing change with central line especially as patient has TPN infusing which poses a high risk for infection. Pulm. resident to room at this time and also stressed importance of dressing change and the dangers of potential infection. Pt and daughter voice understanding and eventually agreed to allow dressing to be changed. Patient tolerated well.

## 2024-01-11 NOTE — PROGRESS NOTES
Comprehensive Nutrition Assessment    Type and Reason for Visit:  Reassess    Nutrition Recommendations/Plan:   Recommend increasing 3-in-1 TPN to 35 kcal/kg, 1.7 g/kg protein and 30% lipid kcals based on dosing weight of 43 kg.  Dr. Hannah kevin with starting bowel medications due to no BM in 6 days per patient and daughter report.  Monitor NPO status, labs, TPN and weight.     Malnutrition Assessment:  Malnutrition Status:  Severe malnutrition (01/10/24 1055)    Context:  Acute Illness     Findings of the 6 clinical characteristics of malnutrition:  Energy Intake:  50% or less of estimated energy requirements for 5 or more days  Weight Loss:  No significant weight loss (limited data ,  however daughter mentions pt used to weigh ~130# several years ago)     Body Fat Loss:  Moderate body fat loss Orbital, Fat Overlying Ribs, Buccal region   Muscle Mass Loss:  Moderate muscle mass loss Clavicles (pectoralis & deltoids), Temples (temporalis), Hand (interosseous)  Fluid Accumulation:  Unable to assess     Strength:  Not Performed    Nutrition Assessment: Pt improving from a nutritional standpoint AEB pt is tolerating 3-in-1 TPN which is meeting 86% of estimated energy needs and 100% of low-end protein needs.  Remains at risk for further nutritional compromise r/t admit with Acute Hypoxic Respiratory Failure, Hyponatremia, SBO likely adhesion from prior  & hysterectomy, need for PN, severe malnutrition, Healthcare Acquired Pneumonia and underlying medical condition COPD, Pneumonia).         Nutrition Related Findings:     Pt. Report/Treatments/Miscellaneous: Pt transitioned to 3-in-1 TPN last night. Pt seen with daughter present this morning- pt reports ongoing nausea from pain medication this morning and abdominal pain. TPN running at 80 ml/hr. Pt NPO. Daughter asking about bowle medications d/t patient with constipation.  GI Status: Pt reports constipation. Last BM x1 on 24 per EMR and per pt and

## 2024-01-11 NOTE — PROGRESS NOTES
[facility], [facility]      Patient - Grace Gonzalez   MRN -  [id number]   [facility] # - [id number]   - 1945      Date of Admission -  2024  7:02 PM  Date of evaluation -  2024  Room - -24/024-A   Hospital Day - 2  Consulting - Darnell Young MD Primary Care Physician - Diomedes Hall MD     Problem List      Active Hospital Problems    Diagnosis Date Noted    Pleural effusion, left [J90] 01/10/2024    Acute respiratory failure (HCC) [J96.00] 2024    Tobacco abuse disorder / long term smoker [Z72.0] 2024    Severe malnutrition (HCC) [E43] 2024    Small bowel obstruction (HCC) likely adhesion related from prior  and hysterectomy [K56.609] 2023     Reason for Consult    Acute hypoxemic respiratory failure with large left sided pleural effusion  Past 24 hours   The patient has improved significantly since thoracocentesis yesterday. She is no long SOB and her cough has improved significantly. Her respiratory effort has improved, and she is now on room air. She still complains of generalized aches and pains.    PMHx   Past Medical History      Diagnosis Date    Arthritis     Asthma     Blood circulation, collateral     Cancer (HCC)     COPD (chronic obstructive pulmonary disease) (HCC)     HAP (hospital-acquired pneumonia) 2024    Pneumonia       Past Surgical History        Procedure Laterality Date    BREAST SURGERY       SECTION      EYE SURGERY      LAPAROTOMY N/A 2023    LAPAROTOMY EXPLORATORY performed by Brendan Dumont MD at [facility] OR    SKIN BIOPSY      RHODA AND BSO (CERVIX REMOVED)       Meds    Current Medications    sennosides-docusate sodium  1 tablet Oral Nightly    ipratropium 0.5 mg-albuterol 2.5 mg  1 Dose Inhalation Q4H WA RT    insulin lispro  0-4 Units SubCUTAneous Q6H    famotidine (PEPCID) 20 mg in sodium chloride (PF) 0.9 % 10 mL injection  20 mg IntraVENous Q12H    calcitonin  1 spray  gallops.  Pulmonary/Chest:  very mildly increased work of breathing, Significantly improved since yesterday. minimal use of accessory muscles. Lung sounds remain decreased. No wheezes, crackles, rhonchi, or stridor.    Abdominal: Soft. Not distended. No tenderness.  Musculoskeletal: No obvious weakness  Extremities: No edema in lower extremities.  Neurological: No obvious sensory or motor deficits.  Psychiatric: No obvious cognitive impairment. Mood congruent affect.       Labs  - Old records and notes have been reviewed in [facility]   ABG  No results found for: \"PH\", \"PO2\", \"PCO2\", \"HCO3\", \"O2SAT\"  No results found for: \"IFIO2\", \"MODE\", \"SETTIDVOL\", \"SETPEEP\"  CBC  Recent Labs     01/09/24  0520 01/10/24  0635   WBC 13.1* 13.6*   RBC 3.37* 3.08*   HGB 10.2* 9.4*   HCT 30.4* 28.4*   MCV 90.2 92.2   MCH 30.3 30.5   MCHC 33.6 33.1   RDW 13.6  --     230   MPV 9.2 9.9      BMP  Recent Labs     01/09/24  0520 01/10/24  0635 01/11/24  0547   * 135 134*   K 3.9 3.8 4.1    102 102   CO2 25 25 25   BUN 23 21 15   CREATININE 0.4* 0.4 0.3*   GLUCOSE 112* 118* 131*   MG 2.2 2.6* 2.2   PHOS 3.1 3.1 2.9   CALCIUM 8.2* 8.5 8.3*     LFT  No results for input(s): \"AST\", \"ALT\", \"ALB\", \"BILITOT\", \"ALKPHOS\", \"AMYLASE\", \"LIPASE\" in the last 72 hours.    TROP  No results found for: \"TROPONINT\"  BNP  No results for input(s): \"BNP\" in the last 72 hours.  Lactic Acid  No results for input(s): \"LACTA\" in the last 72 hours.  INR  No results for input(s): \"INR\", \"PROTIME\" in the last 72 hours.  PTT  No results for input(s): \"APTT\" in the last 72 hours.  Glucose  Recent Labs     01/10/24  1216 01/11/24  0006 01/11/24  0632   POCGLU 127* 132* 158*     UA   Recent Labs     01/10/24  0530 01/10/24  1330   PHUR 6.5  --    COLORU YELLOW YELLOW   PROTEINU NEGATIVE  --    BLOODU NEGATIVE  --    NITRU NEGATIVE  --    GLUCOSEU NEGATIVE  --    BILIRUBINUR NEGATIVE  --    UROBILINOGEN 0.2  --    KETUA NEGATIVE  --    .  Thoracentesis

## 2024-01-12 ENCOUNTER — APPOINTMENT (OUTPATIENT)
Dept: GENERAL RADIOLOGY | Age: 79
End: 2024-01-12
Attending: INTERNAL MEDICINE
Payer: MEDICARE

## 2024-01-12 ENCOUNTER — HOSPITAL ENCOUNTER (OUTPATIENT)
Age: 79
Discharge: HOME | End: 2024-01-23
Attending: INTERNAL MEDICINE
Payer: COMMERCIAL

## 2024-01-12 VITALS
HEART RATE: 76 BPM | SYSTOLIC BLOOD PRESSURE: 118 MMHG | RESPIRATION RATE: 16 BRPM | HEIGHT: 64 IN | BODY MASS INDEX: 16.18 KG/M2 | TEMPERATURE: 98.1 F | DIASTOLIC BLOOD PRESSURE: 64 MMHG | OXYGEN SATURATION: 93 % | WEIGHT: 94.8 LBS

## 2024-01-12 LAB
ANION GAP SERPL CALC-SCNC: 8 MEQ/L (ref 8–16)
BACTERIA SPEC ANAEROBE CULT: NORMAL
BACTERIA SPEC BFLD CULT: NORMAL
BUN SERPL-MCNC: 16 MG/DL (ref 7–22)
CA-I BLD ISE-SCNC: 1.27 MMOL/L (ref 1.12–1.32)
CALCIUM SERPL-MCNC: 8.4 MG/DL (ref 8.5–10.5)
CHLORIDE SERPL-SCNC: 103 MEQ/L (ref 98–111)
CO2 SERPL-SCNC: 25 MEQ/L (ref 23–33)
CREAT SERPL-MCNC: 0.4 MG/DL (ref 0.4–1.2)
GFR SERPL CREATININE-BSD FRML MDRD: > 60 ML/MIN/1.73M2
GLUCOSE BLD STRIP.AUTO-MCNC: 122 MG/DL (ref 70–108)
GLUCOSE SERPL-MCNC: 123 MG/DL (ref 70–108)
GRAM STN SPEC: NORMAL
MAGNESIUM SERPL-MCNC: 1.9 MG/DL (ref 1.6–2.4)
PHOSPHATE SERPL-MCNC: 2.4 MG/DL (ref 2.4–4.7)
POTASSIUM SERPL-SCNC: 3.7 MEQ/L (ref 3.5–5.2)
SODIUM SERPL-SCNC: 136 MEQ/L (ref 135–145)

## 2024-01-12 PROCEDURE — 6360000002 HC RX W HCPCS: Performed by: PHYSICIAN ASSISTANT

## 2024-01-12 PROCEDURE — 94761 N-INVAS EAR/PLS OXIMETRY MLT: CPT

## 2024-01-12 PROCEDURE — 80048 BASIC METABOLIC PNL TOTAL CA: CPT

## 2024-01-12 PROCEDURE — 71046 X-RAY EXAM CHEST 2 VIEWS: CPT

## 2024-01-12 PROCEDURE — 84100 ASSAY OF PHOSPHORUS: CPT

## 2024-01-12 PROCEDURE — A4216 STERILE WATER/SALINE, 10 ML: HCPCS | Performed by: PHYSICIAN ASSISTANT

## 2024-01-12 PROCEDURE — 83735 ASSAY OF MAGNESIUM: CPT

## 2024-01-12 PROCEDURE — 99239 HOSP IP/OBS DSCHRG MGMT >30: CPT | Performed by: INTERNAL MEDICINE

## 2024-01-12 PROCEDURE — 2580000003 HC RX 258: Performed by: PHYSICIAN ASSISTANT

## 2024-01-12 PROCEDURE — 6370000000 HC RX 637 (ALT 250 FOR IP): Performed by: PHYSICIAN ASSISTANT

## 2024-01-12 PROCEDURE — 36415 COLL VENOUS BLD VENIPUNCTURE: CPT

## 2024-01-12 PROCEDURE — 2500000003 HC RX 250 WO HCPCS: Performed by: PHYSICIAN ASSISTANT

## 2024-01-12 PROCEDURE — 6370000000 HC RX 637 (ALT 250 FOR IP): Performed by: INTERNAL MEDICINE

## 2024-01-12 PROCEDURE — 6360000002 HC RX W HCPCS: Performed by: INTERNAL MEDICINE

## 2024-01-12 PROCEDURE — 74018 RADEX ABDOMEN 1 VIEW: CPT

## 2024-01-12 PROCEDURE — 82330 ASSAY OF CALCIUM: CPT

## 2024-01-12 PROCEDURE — 82948 REAGENT STRIP/BLOOD GLUCOSE: CPT

## 2024-01-12 PROCEDURE — 2580000003 HC RX 258: Performed by: INTERNAL MEDICINE

## 2024-01-12 PROCEDURE — 99232 SBSQ HOSP IP/OBS MODERATE 35: CPT | Performed by: INTERNAL MEDICINE

## 2024-01-12 PROCEDURE — 94640 AIRWAY INHALATION TREATMENT: CPT

## 2024-01-12 RX ORDER — BISACODYL 5 MG/1
5 TABLET, DELAYED RELEASE ORAL DAILY PRN
Qty: 30 TABLET | Refills: 0 | Status: ON HOLD | OUTPATIENT
Start: 2024-01-12

## 2024-01-12 RX ORDER — SENNA AND DOCUSATE SODIUM 50; 8.6 MG/1; MG/1
1 TABLET, FILM COATED ORAL NIGHTLY
Status: ON HOLD | DISCHARGE
Start: 2024-01-12

## 2024-01-12 RX ORDER — ENOXAPARIN SODIUM 100 MG/ML
30 INJECTION SUBCUTANEOUS DAILY
Status: ON HOLD | DISCHARGE
Start: 2024-01-13

## 2024-01-12 RX ADMIN — MORPHINE SULFATE 2 MG: 2 INJECTION, SOLUTION INTRAMUSCULAR; INTRAVENOUS at 10:16

## 2024-01-12 RX ADMIN — POLYETHYLENE GLYCOL 3350 17 G: 17 POWDER, FOR SOLUTION ORAL at 11:27

## 2024-01-12 RX ADMIN — ENOXAPARIN SODIUM 30 MG: 100 INJECTION SUBCUTANEOUS at 09:19

## 2024-01-12 RX ADMIN — FAMOTIDINE 20 MG: 10 INJECTION, SOLUTION INTRAVENOUS at 09:19

## 2024-01-12 RX ADMIN — IPRATROPIUM BROMIDE AND ALBUTEROL SULFATE 1 DOSE: .5; 3 SOLUTION RESPIRATORY (INHALATION) at 12:38

## 2024-01-12 RX ADMIN — CEFEPIME 2000 MG: 2 INJECTION, POWDER, FOR SOLUTION INTRAVENOUS at 12:09

## 2024-01-12 RX ADMIN — MORPHINE SULFATE 2 MG: 2 INJECTION, SOLUTION INTRAMUSCULAR; INTRAVENOUS at 07:22

## 2024-01-12 RX ADMIN — SODIUM CHLORIDE, PRESERVATIVE FREE 10 ML: 5 INJECTION INTRAVENOUS at 10:18

## 2024-01-12 ASSESSMENT — PAIN SCALES - GENERAL
PAINLEVEL_OUTOF10: 0
PAINLEVEL_OUTOF10: 8
PAINLEVEL_OUTOF10: 0
PAINLEVEL_OUTOF10: 9

## 2024-01-12 ASSESSMENT — PAIN DESCRIPTION - DESCRIPTORS
DESCRIPTORS: ACHING
DESCRIPTORS: ACHING;DISCOMFORT

## 2024-01-12 ASSESSMENT — PAIN DESCRIPTION - ORIENTATION
ORIENTATION: LEFT
ORIENTATION: MID

## 2024-01-12 ASSESSMENT — PAIN DESCRIPTION - LOCATION
LOCATION: ABDOMEN
LOCATION: ABDOMEN

## 2024-01-12 NOTE — PROGRESS NOTES
Patient educated on how to use incentive spirometer. Patient verbalized understanding and demonstrated proper use. Emphasized importance and usage of device, with coughing and deep breathing every 2 hours while awake.

## 2024-01-12 NOTE — CARE COORDINATION
1/12/24, 8:24 AM EST    Patient goals/plan/ treatment preferences discussed by  and .  Patient goals/plan/ treatment preferences reviewed with patient/ family.  Patient/ family verbalize understanding of discharge plan and are in agreement with goal/plan/treatment preferences.  Understanding was demonstrated using the teach back method.  AVS provided by RN at time of discharge, which includes all necessary medical information pertaining to the patients current course of illness, treatment, post-discharge goals of care, and treatment preferences.     Services At/After Discharge: LTAC  Plans [facility] after choices offered when medically cleared; collaborated w patientTracie Kindred yesterday, PITER Hernandez, Attending

## 2024-01-12 NOTE — PROGRESS NOTES
Discharge teaching and instructions for diagnosis/procedure of respiratory distress completed with patient using teachback method. AVS reviewed. Printed prescriptions given to patient. Patient voiced understanding regarding prescriptions, follow up appointments, and care of self at home. Discharged via cart/stretcher to  [facility]  per  staff

## 2024-01-12 NOTE — DISCHARGE SUMMARY
01/12/24  3:05 AM   Result Value Ref Range    Anion Gap 8.0 8.0 - 16.0 meq/L   Glomerular Filtration Rate, Estimated    Collection Time: 01/12/24  3:05 AM   Result Value Ref Range    Est, Glom Filt Rate >60 >60 ml/min/1.73m2   POCT glucose    Collection Time: 01/12/24  5:26 AM   Result Value Ref Range    POC Glucose 122 (H) 70 - 108 mg/dl    US THORACENTESIS Which side should the procedure be performed? Left      Recent Results (from the past 72 hour(s))   Glucose, Whole Blood    Collection Time: 01/09/24  4:35 PM   Result Value Ref Range    POC Glucose 165 (H) 74 - 100 mg/dL   Culture, Aerobic    Collection Time: 01/09/24 10:55 PM    Specimen: Other   Result Value Ref Range    Aerobic Culture No Acinetobacter species isolated.    MRSA by PCR    Collection Time: 01/09/24 10:55 PM    Specimen: Nares   Result Value Ref Range    MRSA SCREEN RT-PCR NEGATIVE    Urinalysis with Reflex to Culture    Collection Time: 01/10/24  5:30 AM    Specimen: Urine   Result Value Ref Range    Glucose, Ur NEGATIVE NEGATIVE mg/dl    Bilirubin Urine NEGATIVE NEGATIVE    Ketones, Urine NEGATIVE NEGATIVE    Specific Gravity, Urine 1.015 1.002 - 1.030    Blood, Urine NEGATIVE NEGATIVE    pH, UA 6.5 5.0 - 9.0    Protein, UA NEGATIVE NEGATIVE    Urobilinogen, Urine 0.2 0.0 - 1.0 eu/dl    Nitrite, Urine NEGATIVE NEGATIVE    Leukocyte Esterase, Urine NEGATIVE NEGATIVE    Color, UA YELLOW STRAW-YELLOW    Character, Urine CLEAR CLEAR-SL CLOUD   Basic Metabolic Panel w/ Reflex to MG    Collection Time: 01/10/24  6:35 AM   Result Value Ref Range    Sodium 135 135 - 145 meq/L    Potassium reflex Magnesium 3.8 3.5 - 5.2 meq/L    Chloride 102 98 - 111 meq/L    CO2 25 23 - 33 meq/L    Glucose 118 (H) 70 - 108 mg/dL    BUN 21 7 - 22 mg/dL    Creatinine 0.4 0.4 - 1.2 mg/dL    Calcium 8.5 8.5 - 10.5 mg/dL   CBC with Auto Differential    Collection Time: 01/10/24  6:35 AM   Result Value Ref Range    WBC 13.6 (H) 4.8 - 10.8 thou/mm3    RBC 3.08 (L) 4.20 -  The osseous structures are intact. No acute fractures or suspicious osseous lesions.     No pneumothorax following a left-sided thoracentesis. **This report has been created using voice recognition software. It may contain minor errors which are inherent in voice recognition technology.** Final report electronically signed by Dr Wilman Bryant on 1/10/2024 1:44 PM    XR CHEST DECUBITUS LEFT    Result Date: 1/9/2024  EXAMINATION: DECUBITUS XRAY VIEW(S) OF THE CHEST 1/9/2024 2:06 pm COMPARISON: None. HISTORY: ORDERING SYSTEM PROVIDED HISTORY: pleural effusion pre thoracentesis or chest tube. TECHNOLOGIST PROVIDED HISTORY: pleural effusion pre thoracentesis or chest tube. FINDINGS: There is a moderate left free-flowing pleural effusion noted.     Moderate left pleural effusion noted.     XR CHEST (2 VW)    Result Date: 1/9/2024  EXAMINATION: TWO XRAY VIEWS OF THE CHEST 1/9/2024 1:13 pm COMPARISON: 01/07/2024 HISTORY: ORDERING SYSTEM PROVIDED HISTORY: follow up from previous films per gen surg request TECHNOLOGIST PROVIDED HISTORY: follow up from previous films per gen surg request Dr. Ac would also like left lateral view FINDINGS: Significant decrease in size of previously seen large left pleural effusion previously white not the entire left lung.  Moderate residual left pleural effusion is noted.  Persistent left basilar opacity may represent atelectasis.  Right lung remains clear.  No pneumothorax part Cardiomediastinal silhouette and bony thorax are unchanged.  Stable trace right pleural effusion.     Significant decrease in size of previous seen large left pleural effusion with improved aeration of the left lung.  Persistent moderate left pleural effusion and left basilar opacity likely representing atelectasis.       Electronically signed by Darnell Young MD on 1/12/2024 at 1:09 PM

## 2024-01-12 NOTE — PROGRESS NOTES
[facility]      Patient - Grace Gonzalez   MRN -  [id number]   [facility] # - [id number]   - 1945      Date of Admission -  2024  7:02 PM  Date of evaluation -  2024  Room - 4--A   Hospital Day - 3  Consulting - No att. providers found Primary Care Physician - Diomedes Hall MD     Problem List      Active Hospital Problems    Diagnosis Date Noted    Pleural effusion, left [J90] 01/10/2024    Acute respiratory failure (HCC) [J96.00] 2024    Tobacco abuse disorder / long term smoker [Z72.0] 2024    Severe malnutrition (HCC) [E43] 2024    Small bowel obstruction (HCC) likely adhesion related from prior  and hysterectomy [K56.609] 2023     Reason for Consult    Acute hypoxic respiratory failure weith left side pleural effusion   HPI     History Obtained From: Patient and electronic medical record.     Grace Gonzalez is a 78 y.o. femalewith PMH significant for small bowel obstruction and osteoporosis presented to [facility] with acute hypoxemic respiratory failure after an exploratory laparotomy with lysis of adhesions on 2023. She developed acute hypoxemic respiratory failure after the surgery and was found to have a large left sided pleural effusion. She was sent to [facility] for a pulmonology consult and possible thoracentesis.     She is having shortness of breath: Yes  Onset: rapid   Duration:6day.  Diurnal variation:  None.     She is having cough: Yes  Duration of cough: for 6 days.   Her cough is associated with sputum production: Yes   The sputum color: white  Hemoptysis:No  Diurnal variation: None.      Past 24 hrs   -On RA, afebrile   -SOB is improved still complains of abdominal pain   -Has not been passing flatus remains on TPN  All other systems reviewed    PMHx   Past Medical History      Diagnosis Date    Arthritis     Asthma     Blood circulation, collateral     Cancer (HCC)     COPD (chronic  sizable  lymphadenopathy.  Right upper extremity PICC line terminating SVC.  Atherosclerotic disease.  NG tube in place.     The appears to be distal obstruction of the left mainstem bronchus.     Lungs/pleura: Moderate left pleural effusion with essentially complete  collapse of the left upper and lower lobe.  A few tiny aerated lung foci up  to 1 cm in size are noted.  Findings have developed since the comparison  study from 8 days ago.  Differential is obstructing mass or mucous  plugging/aspiration.  Favor the latter.     Emphysematous hyperexpanded right lung.  Right apical pleural-parenchymal  scarring.  Small right pleural effusion with posterior basal consolidation  and septal thickening along the margin of consolidation.  Suspect pneumonia.     No pneumothorax.     Soft Tissues/Bones: Left breast implant.  No aggressive bone lesion.      IMPRESSION:  1. Moderate left pleural effusion with essentially complete collapse of the  left upper and lower lobe. A few tiny aerated lung foci up to 1 cm in size  are noted. Findings have developed since the comparison study from 8 days  ago. Differential is obstructing mass or mucous plugging/aspiration. Favor  the latter.  2. Small right pleural effusion with posterior basal consolidation and septal  thickening along the margin of consolidation.  Suggestive of pneumonia..  3. Interval development of water density 3.2 x 1.7 cm left lateral renal  upper pole hypodensity following the contour of the kidney which appears to  be a subcapsular collection. Indeterminate. Possible evolving subcapsular  hematoma. Attention on follow-up.     (See actual reports for details)    Assessment   -Acute hypoxemic respiratory failure: likely secondary to left pleural effusion and post-op atelectasis. Pneumonia and mucus plugging were also suspected as contributing etiologies. Underlying undiagnosed COPD may also be contributing.  -Pleural Effusion-left sided w/ compression atelectasis:

## 2024-01-12 NOTE — PROGRESS NOTES
Pharmacy Consult for TPN/PPN Monitoring & Adjustment  IV Access: central      Dosing weight: 43 kg  Current insulin regimen: low sliding scale  Diet (excluding TPN): NPO, NG in place   Maintenance fluid: none    Plan:  See components of tonight's TPN bag in the table below:  Rate 1/10/24 1/11/2024 1/12/2024   Total kcal/kg 30 35 35   Total kcal 1290 1505 1505   Protein g/kg 1.5 1.7 1.7   Protein g 64.5 73.1 73.1   Protein kcal (4 kcal/g) 258 292.4 292.4   Lipid % 20 30 30   Lipid g 25.8 45.2 45.2   Lipid kcal (10 kcal/g) 258 451.5 451.5   Dextrose g 228 223.9 223.9   Dextrose kcal (3.4 kcal/g) 774 761.1 761.1   Infusion Rate (mL/hr) 80 80 80   Na Acetate (mEq) none -- --   Na Chloride (mEq) 140 140  140   Na Phos (mmol)  (3 mmol = 4 mEq Na) none -- --   Total Na (mEq) 140 140 140   K Acetate (mEq) 30 30 30   K Chloride (mEq) none -- --   K Phos (mmol)  (15 mmol = 22 mEq K) 15 15 18   Total K (mEq) 52 52 56.4   Ca Gluconate (mEq)  (1 g = 4.65 mEq) 4.65 4.65    4.65   Mag Sulfate (mEq)  (1 g = 8.12 mEq) 6 0 4.06   Multivitamin (mL) 10 10 10   Trace Elements (mL) 1 1 1     Summary of changes for tonight's TPN:   Increase Potassium Phosphate 18 mmol  Add Magnesium Sulfate 4.06 mEq     Electrolyte Replacement:   none    Monitoring:  BMP, Mag, iCal, & Phos ordered for tomorrow with AM labs.    Pharmacy will continue to follow daily. Thank you for the consult.  Debbie Sharma PharmD 1/12/2024 10:04 AM

## 2024-01-12 NOTE — H&P
Hospitalist - [facility] History & Physical      Patient: Grace Gonzalez      PCP: Diomedes Hall MD      Assessment and Plan:        Acute hypoxic respiratory failure: Hospital-acquired pneumonia  On HFNC 90% FiO2 60L/min  Moderate left pleural effusion with collapse of left upper and lower lobs on CT 1/6/2024- at [place]  Consult pulmonology for thoracentesis vs bronchoscopy  Continue cefepime IV q 12 hours  1/10-seen by pulmonary had thoracentesis today will await cultures, cytology, Gram stain-already on empiric antibiotics-wean high flow as able  1/11-pleural effusion--exudative however Gram stain and cultures negative so far-currently on cefepime     1/12- CURRENTLY ON ROOM AIR  - continue cefepime for 5 days   Small bowel obstruction status post exploratory lap and lysis of adhesions at [facility]:   12/31- exploratory laparatomy- lysis of adhesions  Still no flatus or bowel movement  Has been NPO, NG clamped in place  On TPN - will continue current orders-has abdominal binder on will follow-up with general surgery at [facility] once discharged  1/11-stool softeners added  1/12- kub- nonspecific- NG clamped- no nausea- spoke to dr lopez( general surgeon in [place])- ok for trial of clear liquids today   Chronic joint pain:   Long term  No outpatient work up prior  Pain management  Outpatient follow up at discharge  Acute hypovolemic hyponatremia:-Resolved currently on TPN  Trend with daily labs  Severe malnourished-protein-on TPN    CC:  acute respiratory failure    HPI: Patient transferred from [facility] for further treatment of acute hypoxic respiratory failure.  The patient was admitted at [facility] 12/30/2023 for a small bowel obstruction and had exploratory laparatomy 12/31/2023.  On 1/3/2024 patient developed hypoxia and required HFNC alternating with non rebreather mask. Patient was in the ICU and required vasopressor support 1/4/2024 through 1/7/2024.  Patient's blood

## 2024-01-12 NOTE — DISCHARGE INSTR - COC
Continuity of Care Form    Patient Name: Grace Gonzalez   :  1945  MRN:  [id number]    Admit date:  2024  Discharge date:  24    Code Status Order: DNR-CCA   Advance Directives:     Admitting Physician:  Darnell Young MD  PCP: Diomedes Hall MD    Discharging Nurse: Irene  Discharging Hospital Unit/Room#: 4K-24/024-A  Discharging Unit Phone Number: [phone]    Emergency Contact:   Extended Emergency Contact Information  Primary Emergency Contact: Johana Paulson  Mobile Phone: [phone]  Relation: Child  Preferred language: English   needed? No    Past Surgical History:  Past Surgical History:   Procedure Laterality Date    BREAST SURGERY       SECTION      EYE SURGERY      LAPAROTOMY N/A 2023    LAPAROTOMY EXPLORATORY performed by Brendan Dumont MD at [facility] OR    SKIN BIOPSY      RHODA AND BSO (CERVIX REMOVED)         Immunization History:     There is no immunization history on file for this patient.    Active Problems:  Patient Active Problem List   Diagnosis Code    Small bowel obstruction (HCC) likely adhesion related from prior  and hysterectomy K56.609    Severe malnutrition (HCC) E43    COPD with hypoxia (HCC) J44.9    HAP (hospital-acquired pneumonia) / LLL /  J18.9, Y95    Tobacco abuse disorder / long term smoker Z72.0    Acute respiratory failure (HCC) J96.00    Pleural effusion, left J90       Isolation/Infection:   Isolation            No Isolation          Patient Infection Status       None to display            Nurse Assessment:  Last Vital Signs: /66   Pulse 90   Temp 98.1 °F (36.7 °C) (Oral)   Resp 18   Ht 1.626 m (5' 4\")   Wt 43 kg (94 lb 12.8 oz)   SpO2 (!) 88%   BMI 16.27 kg/m²     Last documented pain score (0-10 scale): Pain Level: 8  Last Weight:   Wt Readings from Last 1 Encounters:   24 43 kg (94 lb 12.8 oz)     Mental Status:  oriented and alert    IV Access:  - PICC - site  R Upper Arm, insertion  date: 1/3/2024    Nursing Mobility/ADLs:  Walking   Assisted  Transfer  Assisted  Bathing  Assisted  Dressing  Assisted  Toileting  Assisted  Feeding  Assisted  Med Admin  Assisted  Med Delivery   whole    Wound Care Documentation and Therapy:  Wound 01/09/24 Buttocks Anterior (Active)   Wound Etiology Pressure Stage 2 01/12/24 0800   Dressing/Treatment Zinc paste;Silicone border;Protective barrier 01/12/24 0800   Wound Assessment Dry;Pink/red 01/12/24 0800   Drainage Amount None (dry) 01/12/24 0800   Odor None 01/12/24 0800   Pebbles-wound Assessment Non-blanchable erythema 01/12/24 0800   Number of days: 2       Incision 12/31/23 Abdomen (Active)   Dressing Status Clean;Dry;Intact 01/12/24 0800   Incision Cleansed Cleansed with saline 12/31/23 1540   Dressing/Treatment ABD pad 01/12/24 0800   Closure Staples 01/12/24 0800   Margins Approximated 01/12/24 0800   Incision Assessment Dry 01/12/24 0800   Drainage Amount None (dry) 01/12/24 0800   Odor None 01/12/24 0800   Pebbles-incision Assessment Intact 01/12/24 0800   Number of days: 11        Elimination:  Continence:   Bowel: Yes  Bladder: Yes  Urinary Catheter: Insertion Date: 1/9/2024    Colostomy/Ileostomy/Ileal Conduit: No       Date of Last BM: 1/6/24    Intake/Output Summary (Last 24 hours) at 1/12/2024 1309  Last data filed at 1/12/2024 0908  Gross per 24 hour   Intake 3339.26 ml   Output 1975 ml   Net 1364.26 ml     I/O last 3 completed shifts:  In: 2123.4 [I.V.:359.8; IV Piggyback:203]  Out: 2425 [Urine:2425]    Safety Concerns:     History of Falls (last 30 days)    Impairments/Disabilities:      None    Nutrition Therapy:  Current Nutrition Therapy:   - Parenteral Nutrition:  1920 ml over 24 hrs per day (see PN orders for content)    Routes of Feeding: Parenteral Nutrition (PN)  Liquids: Thin Liquids  Daily Fluid Restriction: no  Last Modified Barium Swallow with Video (Video Swallowing Test): not done    Treatments at the Time of Hospital Discharge:

## 2024-01-12 NOTE — PLAN OF CARE
Problem: Safety - Adult  Goal: Free from fall injury  1/12/2024 0419 by Chastity Tejada RN  Outcome: Progressing  Flowsheets (Taken 1/12/2024 0419)  Free From Fall Injury: Instruct family/caregiver on patient safety     Problem: Discharge Planning  Goal: Discharge to home or other facility with appropriate resources  1/12/2024 0419 by Chastity Tejada RN  Outcome: Progressing  Flowsheets (Taken 1/12/2024 0419)  Discharge to home or other facility with appropriate resources:   Identify barriers to discharge with patient and caregiver   Arrange for needed discharge resources and transportation as appropriate   Identify discharge learning needs (meds, wound care, etc)     Problem: Skin/Tissue Integrity  Goal: Absence of new skin breakdown  Description: 1.  Monitor for areas of redness and/or skin breakdown  2.  Assess vascular access sites hourly  3.  Every 4-6 hours minimum:  Change oxygen saturation probe site  4.  Every 4-6 hours:  If on nasal continuous positive airway pressure, respiratory therapy assess nares and determine need for appliance change or resting period.  1/12/2024 0419 by Chastity Tejada RN  Outcome: Progressing  Note: No new signs of skin breakdown assessed this shift.      Problem: ABCDS Injury Assessment  Goal: Absence of physical injury  1/12/2024 0419 by Chastity Tejada RN  Outcome: Progressing  Flowsheets (Taken 1/12/2024 0419)  Absence of Physical Injury: Implement safety measures based on patient assessment     Problem: Pain  Goal: Verbalizes/displays adequate comfort level or baseline comfort level  1/12/2024 0419 by Chastity Tejada RN  Outcome: Progressing  Flowsheets (Taken 1/12/2024 0419)  Verbalizes/displays adequate comfort level or baseline comfort level:   Assess pain using appropriate pain scale   Administer analgesics based on type and severity of pain and evaluate response   Encourage patient to monitor pain and request assistance     Problem: Respiratory - Adult  Goal:  sites and surrounding tissue     Problem: Skin/Tissue Integrity - Adult  Goal: Oral mucous membranes remain intact  1/12/2024 0419 by Chastity Tejada, RN  Outcome: Progressing  Flowsheets (Taken 1/12/2024 0419)  Oral Mucous Membranes Remain Intact: Assess oral mucosa and hygiene practices     Problem: Musculoskeletal - Adult  Goal: Return mobility to safest level of function  Outcome: Progressing  Flowsheets (Taken 1/12/2024 0419)  Return Mobility to Safest Level of Function:   Assess patient stability and activity tolerance for standing, transferring and ambulating with or without assistive devices   Assist with transfers and ambulation using safe patient handling equipment as needed   Ensure adequate protection for wounds/incisions during mobilization     Problem: Musculoskeletal - Adult  Goal: Maintain proper alignment of affected body part  Outcome: Progressing  Flowsheets (Taken 1/12/2024 0419)  Maintain proper alignment of affected body part: Support and protect limb and body alignment per provider's orders     Problem: Musculoskeletal - Adult  Goal: Return ADL status to a safe level of function  Outcome: Progressing  Flowsheets (Taken 1/12/2024 0419)  Return ADL Status to a Safe Level of Function:   Assess activities of daily living deficits and provide assistive devices as needed   Administer medication as ordered     Problem: Nutrition Deficit:  Goal: Optimize nutritional status  1/12/2024 0419 by Chastity Tejada, RN  Outcome: Progressing  Flowsheets (Taken 1/12/2024 0419)  Nutrient intake appropriate for improving, restoring, or maintaining nutritional needs:   Recommend appropriate diets, oral nutritional supplements, and vitamin/mineral supplements   Assess nutritional status and recommend course of action   Monitor oral intake, labs, and treatment plans    Care plan reviewed with patient.  Patient verbalize understanding of the plan of care and contribute to goal setting.

## 2024-01-12 NOTE — PROGRESS NOTES
Comprehensive Nutrition Assessment    Type and Reason for Visit:  Reassess    Nutrition Recommendations/Plan:   When 3 in 1 TPN is made, suggest continue dose wt: 43kgm 30kcals/kgm, 1.7 grams protein/kgm, 30% kcals).  Spoke with Irene PALMA who reports Dr Flores getting ready to start clear liquid diet ( await orders) & reports getting ready to give pt some Miralax. No BM in 7 days per pt report.   Monitor for possible diet start, BM, TPN, labs, &wt.      Malnutrition Assessment:  Malnutrition Status:  Severe malnutrition (01/10/24 1055)    Context:  Acute Illness     Findings of the 6 clinical characteristics of malnutrition:  Energy Intake:  50% or less of estimated energy requirements for 5 or more days  Weight Loss:  No significant weight loss (limited data ,  however daughter mentions pt used to weigh ~130# several years ago)     Body Fat Loss:  Moderate body fat loss Orbital, Fat Overlying Ribs, Buccal region   Muscle Mass Loss:  Moderate muscle mass loss Clavicles (pectoralis & deltoids), Temples (temporalis), Hand (interosseous)  Fluid Accumulation:  Unable to assess     Strength:  Not Performed       Nutrition Assessment: Pt improving from a nutritional standpoint AEB pt is tolerating 3-in-1 TPN  however no BM day 7, not passing gas per pt.    Remains at risk for further nutritional compromise r/t admit with Acute Hypoxic Respiratory Failure, Hyponatremia, SBO likely adhesion from prior  & hysterectomy, need for PN, severe malnutrition, Healthcare Acquired Pneumonia and underlying medical condition COPD, Pneumonia).     Nutrition Related Findings:    Pt. Report/Treatments/Miscellaneous: Pt seen, 3 in 1 TPN infusing 80ml/hr, mentions she is looking forward to po diet when allowed however mentions not passing gas. She mentions she was nauseated earlier while being  transported for an  an X ray but mentions nausea is better.   GI Status: No BM x 7 days- RN aware & reports planning on giving pt  Parenteral Nutrition  Nutrition Education/Counseling: Education initiated (Discussed TPN with pt)  Coordination of Nutrition Care: Continue to monitor while inpatient, Interdisciplinary Rounds       Goals:  Previous Goal Met: No Progress toward Goal(s)  Goals: Meet at least 75% of estimated needs, by next RD assessment       Nutrition Monitoring and Evaluation:      Food/Nutrient Intake Outcomes: Parenteral Nutrition Intake/Tolerance  Physical Signs/Symptoms Outcomes: Biochemical Data, Chewing or Swallowing, Constipation, GI Status, Fluid Status or Edema, Nutrition Focused Physical Findings, Skin, Weight    Discharge Planning:    Too soon to determine     Allison Rodriguez RD, LD  Contact: [phone]

## 2024-01-13 LAB
ANION GAP SERPL CALC-SCNC: 9 MEQ/L (ref 8–16)
BASOPHILS ABSOLUTE: 0.1 THOU/MM3 (ref 0–0.1)
BASOPHILS NFR BLD AUTO: 0.5 %
BUN SERPL-MCNC: 21 MG/DL (ref 7–22)
CALCIUM SERPL-MCNC: 8.5 MG/DL (ref 8.5–10.5)
CHLORIDE SERPL-SCNC: 105 MEQ/L (ref 98–111)
CO2 SERPL-SCNC: 21 MEQ/L (ref 23–33)
CREAT SERPL-MCNC: 0.4 MG/DL (ref 0.4–1.2)
DEPRECATED RDW RBC AUTO: 48.8 FL (ref 35–45)
EOSINOPHIL NFR BLD AUTO: 2.1 %
EOSINOPHILS ABSOLUTE: 0.3 THOU/MM3 (ref 0–0.4)
ERYTHROCYTE [DISTWIDTH] IN BLOOD BY AUTOMATED COUNT: 14.6 % (ref 11.5–14.5)
GFR SERPL CREATININE-BSD FRML MDRD: > 60 ML/MIN/1.73M2
GLUCOSE SERPL-MCNC: 141 MG/DL (ref 70–108)
HCT VFR BLD AUTO: 33.3 % (ref 37–47)
HGB BLD-MCNC: 10.7 GM/DL (ref 12–16)
IMM GRANULOCYTES # BLD AUTO: 0.38 THOU/MM3 (ref 0–0.07)
IMM GRANULOCYTES NFR BLD AUTO: 2.5 %
LYMPHOCYTES ABSOLUTE: 1.8 THOU/MM3 (ref 1–4.8)
LYMPHOCYTES NFR BLD AUTO: 11.4 %
MAGNESIUM SERPL-MCNC: 1.7 MG/DL (ref 1.6–2.4)
MCH RBC QN AUTO: 30.3 PG (ref 26–33)
MCHC RBC AUTO-ENTMCNC: 32.1 GM/DL (ref 32.2–35.5)
MCV RBC AUTO: 94.3 FL (ref 81–99)
MONOCYTES ABSOLUTE: 1.3 THOU/MM3 (ref 0.4–1.3)
MONOCYTES NFR BLD AUTO: 8.6 %
NEUTROPHILS NFR BLD AUTO: 74.9 %
NRBC BLD AUTO-RTO: 0 /100 WBC
PHOSPHATE SERPL-MCNC: 2.4 MG/DL (ref 2.4–4.7)
PLATELET # BLD AUTO: 323 THOU/MM3 (ref 130–400)
PMV BLD AUTO: 9.1 FL (ref 9.4–12.4)
POTASSIUM SERPL-SCNC: 4 MEQ/L (ref 3.5–5.2)
RBC # BLD AUTO: 3.53 MILL/MM3 (ref 4.2–5.4)
SEGMENTED NEUTROPHILS ABSOLUTE COUNT: 11.6 THOU/MM3 (ref 1.8–7.7)
SODIUM SERPL-SCNC: 135 MEQ/L (ref 135–145)
WBC # BLD AUTO: 15.5 THOU/MM3 (ref 4.8–10.8)

## 2024-01-14 LAB
ANION GAP SERPL CALC-SCNC: 7 MEQ/L (ref 8–16)
BASOPHILS ABSOLUTE: 0.1 THOU/MM3 (ref 0–0.1)
BASOPHILS NFR BLD AUTO: 0.5 %
BUN SERPL-MCNC: 22 MG/DL (ref 7–22)
CALCIUM SERPL-MCNC: 8.3 MG/DL (ref 8.5–10.5)
CHLORIDE SERPL-SCNC: 109 MEQ/L (ref 98–111)
CO2 SERPL-SCNC: 19 MEQ/L (ref 23–33)
CREAT SERPL-MCNC: 0.3 MG/DL (ref 0.4–1.2)
DEPRECATED RDW RBC AUTO: 50.1 FL (ref 35–45)
EOSINOPHIL NFR BLD AUTO: 1.6 %
EOSINOPHILS ABSOLUTE: 0.2 THOU/MM3 (ref 0–0.4)
ERYTHROCYTE [DISTWIDTH] IN BLOOD BY AUTOMATED COUNT: 14.6 % (ref 11.5–14.5)
GFR SERPL CREATININE-BSD FRML MDRD: > 60 ML/MIN/1.73M2
GLUCOSE SERPL-MCNC: 129 MG/DL (ref 70–108)
HCT VFR BLD AUTO: 32.8 % (ref 37–47)
HGB BLD-MCNC: 10.5 GM/DL (ref 12–16)
IMM GRANULOCYTES # BLD AUTO: 0.29 THOU/MM3 (ref 0–0.07)
IMM GRANULOCYTES NFR BLD AUTO: 2.2 %
LYMPHOCYTES ABSOLUTE: 1.7 THOU/MM3 (ref 1–4.8)
LYMPHOCYTES NFR BLD AUTO: 13.3 %
MAGNESIUM SERPL-MCNC: 1.8 MG/DL (ref 1.6–2.4)
MCH RBC QN AUTO: 30.6 PG (ref 26–33)
MCHC RBC AUTO-ENTMCNC: 32 GM/DL (ref 32.2–35.5)
MCV RBC AUTO: 95.6 FL (ref 81–99)
MONOCYTES ABSOLUTE: 1.3 THOU/MM3 (ref 0.4–1.3)
MONOCYTES NFR BLD AUTO: 10.3 %
NEUTROPHILS NFR BLD AUTO: 72.1 %
NRBC BLD AUTO-RTO: 0 /100 WBC
PHOSPHATE SERPL-MCNC: 2.8 MG/DL (ref 2.4–4.7)
PLATELET # BLD AUTO: 315 THOU/MM3 (ref 130–400)
PMV BLD AUTO: 9.3 FL (ref 9.4–12.4)
POTASSIUM SERPL-SCNC: 3.8 MEQ/L (ref 3.5–5.2)
RBC # BLD AUTO: 3.43 MILL/MM3 (ref 4.2–5.4)
SEGMENTED NEUTROPHILS ABSOLUTE COUNT: 9.4 THOU/MM3 (ref 1.8–7.7)
SODIUM SERPL-SCNC: 135 MEQ/L (ref 135–145)
WBC # BLD AUTO: 13.1 THOU/MM3 (ref 4.8–10.8)

## 2024-01-15 LAB
ALBUMIN SERPL BCG-MCNC: 2.8 G/DL (ref 3.5–5.1)
ALP SERPL-CCNC: 96 U/L (ref 38–126)
ALT SERPL W/O P-5'-P-CCNC: 32 U/L (ref 11–66)
ANION GAP SERPL CALC-SCNC: 9 MEQ/L (ref 8–16)
AST SERPL-CCNC: 22 U/L (ref 5–40)
BACTERIA SPEC ANAEROBE CULT: NORMAL
BACTERIA SPEC BFLD CULT: NORMAL
BASOPHILS ABSOLUTE: 0 THOU/MM3 (ref 0–0.1)
BASOPHILS NFR BLD AUTO: 0.4 %
BILIRUB CONJ SERPL-MCNC: < 0.2 MG/DL (ref 0–0.3)
BILIRUB SERPL-MCNC: 0.4 MG/DL (ref 0.3–1.2)
BUN SERPL-MCNC: 20 MG/DL (ref 7–22)
CALCIUM SERPL-MCNC: 8.5 MG/DL (ref 8.5–10.5)
CALCIUM SERPL-MCNC: 8.8 MG/DL (ref 8.5–10.5)
CHLORIDE SERPL-SCNC: 108 MEQ/L (ref 98–111)
CO2 SERPL-SCNC: 19 MEQ/L (ref 23–33)
CREAT SERPL-MCNC: 0.4 MG/DL (ref 0.4–1.2)
DEPRECATED RDW RBC AUTO: 49.1 FL (ref 35–45)
EOSINOPHIL NFR BLD AUTO: 1.4 %
EOSINOPHILS ABSOLUTE: 0.2 THOU/MM3 (ref 0–0.4)
ERYTHROCYTE [DISTWIDTH] IN BLOOD BY AUTOMATED COUNT: 14.7 % (ref 11.5–14.5)
GFR SERPL CREATININE-BSD FRML MDRD: > 60 ML/MIN/1.73M2
GLUCOSE SERPL-MCNC: 134 MG/DL (ref 70–108)
GRAM STN SPEC: NORMAL
HCT VFR BLD AUTO: 30.3 % (ref 37–47)
HGB BLD-MCNC: 10 GM/DL (ref 12–16)
IMM GRANULOCYTES # BLD AUTO: 0.26 THOU/MM3 (ref 0–0.07)
IMM GRANULOCYTES NFR BLD AUTO: 2.1 %
LYMPHOCYTES ABSOLUTE: 1.5 THOU/MM3 (ref 1–4.8)
LYMPHOCYTES NFR BLD AUTO: 12.2 %
MAGNESIUM SERPL-MCNC: 1.9 MG/DL (ref 1.6–2.4)
MCH RBC QN AUTO: 30.9 PG (ref 26–33)
MCHC RBC AUTO-ENTMCNC: 33 GM/DL (ref 32.2–35.5)
MCV RBC AUTO: 93.5 FL (ref 81–99)
MONOCYTES ABSOLUTE: 1.3 THOU/MM3 (ref 0.4–1.3)
MONOCYTES NFR BLD AUTO: 10.8 %
NEUTROPHILS NFR BLD AUTO: 73.1 %
NRBC BLD AUTO-RTO: 0 /100 WBC
PHOSPHATE SERPL-MCNC: 3.1 MG/DL (ref 2.4–4.7)
PLATELET # BLD AUTO: 317 THOU/MM3 (ref 130–400)
PMV BLD AUTO: 9 FL (ref 9.4–12.4)
POTASSIUM SERPL-SCNC: 3.8 MEQ/L (ref 3.5–5.2)
PREALB SERPL-MCNC: 18.2 MG/DL (ref 20–40)
PROT SERPL-MCNC: 6.1 G/DL (ref 6.1–8)
RBC # BLD AUTO: 3.24 MILL/MM3 (ref 4.2–5.4)
SEGMENTED NEUTROPHILS ABSOLUTE COUNT: 9.1 THOU/MM3 (ref 1.8–7.7)
SODIUM SERPL-SCNC: 136 MEQ/L (ref 135–145)
TRIGL SERPL-MCNC: 140 MG/DL (ref 0–199)
WBC # BLD AUTO: 12.4 THOU/MM3 (ref 4.8–10.8)

## 2024-01-16 LAB
ANION GAP SERPL CALC-SCNC: 9 MEQ/L (ref 8–16)
BASOPHILS ABSOLUTE: 0.1 THOU/MM3 (ref 0–0.1)
BASOPHILS NFR BLD AUTO: 0.9 %
BUN SERPL-MCNC: 22 MG/DL (ref 7–22)
BURR CELLS BLD QL SMEAR: ABNORMAL
CALCIUM SERPL-MCNC: 8.7 MG/DL (ref 8.5–10.5)
CHLORIDE SERPL-SCNC: 107 MEQ/L (ref 98–111)
CO2 SERPL-SCNC: 21 MEQ/L (ref 23–33)
CREAT SERPL-MCNC: 0.4 MG/DL (ref 0.4–1.2)
DEPRECATED RDW RBC AUTO: 50 FL (ref 35–45)
EOSINOPHIL NFR BLD AUTO: 1.3 %
EOSINOPHILS ABSOLUTE: 0.1 THOU/MM3 (ref 0–0.4)
ERYTHROCYTE [DISTWIDTH] IN BLOOD BY AUTOMATED COUNT: 14.6 % (ref 11.5–14.5)
GFR SERPL CREATININE-BSD FRML MDRD: > 60 ML/MIN/1.73M2
GLUCOSE SERPL-MCNC: 107 MG/DL (ref 70–108)
HCT VFR BLD AUTO: 30.9 % (ref 37–47)
HGB BLD-MCNC: 10.2 GM/DL (ref 12–16)
IMM GRANULOCYTES # BLD AUTO: 0.16 THOU/MM3 (ref 0–0.07)
IMM GRANULOCYTES NFR BLD AUTO: 2 %
LYMPHOCYTES ABSOLUTE: 1.9 THOU/MM3 (ref 1–4.8)
LYMPHOCYTES NFR BLD AUTO: 23.4 %
MCH RBC QN AUTO: 31.1 PG (ref 26–33)
MCHC RBC AUTO-ENTMCNC: 33 GM/DL (ref 32.2–35.5)
MCV RBC AUTO: 94.2 FL (ref 81–99)
MONOCYTES ABSOLUTE: 1.2 THOU/MM3 (ref 0.4–1.3)
MONOCYTES NFR BLD AUTO: 14.9 %
NEUTROPHILS NFR BLD AUTO: 57.5 %
NRBC BLD AUTO-RTO: 0 /100 WBC
PLATELET # BLD AUTO: 313 THOU/MM3 (ref 130–400)
PLATELET BLD QL SMEAR: ADEQUATE
PMV BLD AUTO: 9.2 FL (ref 9.4–12.4)
POLYCHROMASIA BLD QL SMEAR: ABNORMAL
POTASSIUM SERPL-SCNC: 3.9 MEQ/L (ref 3.5–5.2)
RBC # BLD AUTO: 3.28 MILL/MM3 (ref 4.2–5.4)
SCAN OF BLOOD SMEAR: NORMAL
SEGMENTED NEUTROPHILS ABSOLUTE COUNT: 4.7 THOU/MM3 (ref 1.8–7.7)
SODIUM SERPL-SCNC: 137 MEQ/L (ref 135–145)
VARIANT LYMPHS BLD QL SMEAR: ABNORMAL %
WBC # BLD AUTO: 8.2 THOU/MM3 (ref 4.8–10.8)

## 2024-01-17 LAB
ANION GAP SERPL CALC-SCNC: 15 MEQ/L (ref 8–16)
BASOPHILS ABSOLUTE: 0.1 THOU/MM3 (ref 0–0.1)
BASOPHILS NFR BLD AUTO: 0.7 %
BUN SERPL-MCNC: 21 MG/DL (ref 7–22)
CALCIUM SERPL-MCNC: 9 MG/DL (ref 8.5–10.5)
CHLORIDE SERPL-SCNC: 107 MEQ/L (ref 98–111)
CO2 SERPL-SCNC: 20 MEQ/L (ref 23–33)
CREAT SERPL-MCNC: 0.4 MG/DL (ref 0.4–1.2)
DEPRECATED RDW RBC AUTO: 49.6 FL (ref 35–45)
EOSINOPHIL NFR BLD AUTO: 4.4 %
EOSINOPHILS ABSOLUTE: 0.4 THOU/MM3 (ref 0–0.4)
ERYTHROCYTE [DISTWIDTH] IN BLOOD BY AUTOMATED COUNT: 14.6 % (ref 11.5–14.5)
GFR SERPL CREATININE-BSD FRML MDRD: > 60 ML/MIN/1.73M2
GLUCOSE SERPL-MCNC: 124 MG/DL (ref 70–108)
HCT VFR BLD AUTO: 30.3 % (ref 37–47)
HGB BLD-MCNC: 9.9 GM/DL (ref 12–16)
IMM GRANULOCYTES # BLD AUTO: 0.18 THOU/MM3 (ref 0–0.07)
IMM GRANULOCYTES NFR BLD AUTO: 2.1 %
LYMPHOCYTES ABSOLUTE: 1.5 THOU/MM3 (ref 1–4.8)
LYMPHOCYTES NFR BLD AUTO: 17.3 %
MCH RBC QN AUTO: 30.6 PG (ref 26–33)
MCHC RBC AUTO-ENTMCNC: 32.7 GM/DL (ref 32.2–35.5)
MCV RBC AUTO: 93.5 FL (ref 81–99)
MONOCYTES ABSOLUTE: 1 THOU/MM3 (ref 0.4–1.3)
MONOCYTES NFR BLD AUTO: 12 %
NEUTROPHILS NFR BLD AUTO: 63.5 %
NRBC BLD AUTO-RTO: 0 /100 WBC
PLATELET # BLD AUTO: 308 THOU/MM3 (ref 130–400)
PMV BLD AUTO: 9.4 FL (ref 9.4–12.4)
POTASSIUM SERPL-SCNC: 4 MEQ/L (ref 3.5–5.2)
RBC # BLD AUTO: 3.24 MILL/MM3 (ref 4.2–5.4)
SEGMENTED NEUTROPHILS ABSOLUTE COUNT: 5.5 THOU/MM3 (ref 1.8–7.7)
SODIUM SERPL-SCNC: 142 MEQ/L (ref 135–145)
WBC # BLD AUTO: 8.7 THOU/MM3 (ref 4.8–10.8)

## 2024-01-18 LAB
ALBUMIN SERPL BCG-MCNC: 3.1 G/DL (ref 3.5–5.1)
ALP SERPL-CCNC: 103 U/L (ref 38–126)
ALT SERPL W/O P-5'-P-CCNC: 33 U/L (ref 11–66)
ANION GAP SERPL CALC-SCNC: 7 MEQ/L (ref 8–16)
AST SERPL-CCNC: 24 U/L (ref 5–40)
BASOPHILS ABSOLUTE: 0.1 THOU/MM3 (ref 0–0.1)
BASOPHILS NFR BLD AUTO: 0.7 %
BILIRUB CONJ SERPL-MCNC: < 0.2 MG/DL (ref 0–0.3)
BILIRUB SERPL-MCNC: 0.3 MG/DL (ref 0.3–1.2)
BUN SERPL-MCNC: 18 MG/DL (ref 7–22)
CALCIUM SERPL-MCNC: 9.2 MG/DL (ref 8.5–10.5)
CHLORIDE SERPL-SCNC: 103 MEQ/L (ref 98–111)
CO2 SERPL-SCNC: 26 MEQ/L (ref 23–33)
CREAT SERPL-MCNC: 0.4 MG/DL (ref 0.4–1.2)
DEPRECATED RDW RBC AUTO: 49.7 FL (ref 35–45)
EOSINOPHIL NFR BLD AUTO: 2.8 %
EOSINOPHILS ABSOLUTE: 0.2 THOU/MM3 (ref 0–0.4)
ERYTHROCYTE [DISTWIDTH] IN BLOOD BY AUTOMATED COUNT: 14.6 % (ref 11.5–14.5)
GFR SERPL CREATININE-BSD FRML MDRD: > 60 ML/MIN/1.73M2
GLUCOSE SERPL-MCNC: 120 MG/DL (ref 70–108)
HCT VFR BLD AUTO: 30.7 % (ref 37–47)
HGB BLD-MCNC: 10.1 GM/DL (ref 12–16)
IMM GRANULOCYTES # BLD AUTO: 0.12 THOU/MM3 (ref 0–0.07)
IMM GRANULOCYTES NFR BLD AUTO: 1.6 %
LYMPHOCYTES ABSOLUTE: 1.4 THOU/MM3 (ref 1–4.8)
LYMPHOCYTES NFR BLD AUTO: 19.4 %
MAGNESIUM SERPL-MCNC: 1.8 MG/DL (ref 1.6–2.4)
MCH RBC QN AUTO: 30.8 PG (ref 26–33)
MCHC RBC AUTO-ENTMCNC: 32.9 GM/DL (ref 32.2–35.5)
MCV RBC AUTO: 93.6 FL (ref 81–99)
MONOCYTES ABSOLUTE: 0.9 THOU/MM3 (ref 0.4–1.3)
MONOCYTES NFR BLD AUTO: 12.8 %
NEUTROPHILS NFR BLD AUTO: 62.7 %
NRBC BLD AUTO-RTO: 0 /100 WBC
PHOSPHATE SERPL-MCNC: 3.2 MG/DL (ref 2.4–4.7)
PLATELET # BLD AUTO: 295 THOU/MM3 (ref 130–400)
PMV BLD AUTO: 9.3 FL (ref 9.4–12.4)
POTASSIUM SERPL-SCNC: 4.2 MEQ/L (ref 3.5–5.2)
PROT SERPL-MCNC: 6.4 G/DL (ref 6.1–8)
RBC # BLD AUTO: 3.28 MILL/MM3 (ref 4.2–5.4)
SEGMENTED NEUTROPHILS ABSOLUTE COUNT: 4.6 THOU/MM3 (ref 1.8–7.7)
SODIUM SERPL-SCNC: 136 MEQ/L (ref 135–145)
WBC # BLD AUTO: 7.4 THOU/MM3 (ref 4.8–10.8)

## 2024-01-19 NOTE — CONSULTS
Palliative care was consulted and evaluated patient 1/5/2023 and patient made DNR-CCA.    CT chest was completed on 1/6/2024 and showed moderate large pleural effusion with complete collapse of the left upper and lower lobe with small right pleural effusion. Chest x-ray showed blunting concerning for pneumonia versus reactive pleural effusion. Concern was for mucous plug at that time. Further conversations took place with the patient and the need for bronchoscopy and plan was made to transfer to [facility]. Patient continued to remain here at [facility] due to lack of beds at tertiary centers.    Patient was admitted to [facility] on 1/9/2024 for acute hypoxic respiratory failure.  On arrival, patient was on high flow nasal cannula.  Patient with a moderate left pleural effusion and collapse of the left upper and lower lobes on CT.  Pulmonology was consulted for thoracentesis vs bronchoscopy.  1/10/2024, patient underwent a thoracentesis of the left pleural effusion.  Following thoracentesis, patient was able to be transitioned to room air.  Chest x-ray on 1/12/2024 reportedly without any significant reaccumulation of pleural fluid and good inflation of the left lung field.  [facility] and was discharged from [facility] on 1/12/2024.    Since admission to [facility], patient has been participating with PT/OT. She is proud to tell me that she ambulated 140 feet today. Patient reports that she is now having bowel movements and had 7 bowel movements yesterday. Patient reports that prior to these hospitalizations she was very independent and is hopeful to return home independently. At time of my exam, patient reports that she is very nauseated and is trying not to vomit. She does let me speak with her briefly however, she states that she is too nauseous to carry on.  She apologizes many times but asks that I contact her daughter.      Occupation: Retired   Lives with: Alone  Home

## 2024-01-20 ENCOUNTER — APPOINTMENT (OUTPATIENT)
Dept: GENERAL RADIOLOGY | Age: 79
End: 2024-01-20
Attending: INTERNAL MEDICINE
Payer: COMMERCIAL

## 2024-01-20 LAB
ANION GAP SERPL CALC-SCNC: 12 MEQ/L (ref 8–16)
BASOPHILS ABSOLUTE: 0 THOU/MM3 (ref 0–0.1)
BASOPHILS NFR BLD AUTO: 0.5 %
BUN SERPL-MCNC: 25 MG/DL (ref 7–22)
CALCIUM SERPL-MCNC: 9.3 MG/DL (ref 8.5–10.5)
CHLORIDE SERPL-SCNC: 99 MEQ/L (ref 98–111)
CO2 SERPL-SCNC: 25 MEQ/L (ref 23–33)
CREAT SERPL-MCNC: 0.5 MG/DL (ref 0.4–1.2)
DEPRECATED RDW RBC AUTO: 49.9 FL (ref 35–45)
EOSINOPHIL NFR BLD AUTO: 3.6 %
EOSINOPHILS ABSOLUTE: 0.3 THOU/MM3 (ref 0–0.4)
ERYTHROCYTE [DISTWIDTH] IN BLOOD BY AUTOMATED COUNT: 14.8 % (ref 11.5–14.5)
GFR SERPL CREATININE-BSD FRML MDRD: > 60 ML/MIN/1.73M2
GLUCOSE SERPL-MCNC: 115 MG/DL (ref 70–108)
HCT VFR BLD AUTO: 33.2 % (ref 37–47)
HGB BLD-MCNC: 10.9 GM/DL (ref 12–16)
IMM GRANULOCYTES # BLD AUTO: 0.05 THOU/MM3 (ref 0–0.07)
IMM GRANULOCYTES NFR BLD AUTO: 0.6 %
LYMPHOCYTES ABSOLUTE: 2 THOU/MM3 (ref 1–4.8)
LYMPHOCYTES NFR BLD AUTO: 26.2 %
MCH RBC QN AUTO: 31.1 PG (ref 26–33)
MCHC RBC AUTO-ENTMCNC: 32.8 GM/DL (ref 32.2–35.5)
MCV RBC AUTO: 94.6 FL (ref 81–99)
MONOCYTES ABSOLUTE: 1.1 THOU/MM3 (ref 0.4–1.3)
MONOCYTES NFR BLD AUTO: 14.2 %
NEUTROPHILS NFR BLD AUTO: 54.9 %
NRBC BLD AUTO-RTO: 0 /100 WBC
PLATELET # BLD AUTO: 270 THOU/MM3 (ref 130–400)
PLATELET BLD QL SMEAR: ADEQUATE
PMV BLD AUTO: 9 FL (ref 9.4–12.4)
POTASSIUM SERPL-SCNC: 4.1 MEQ/L (ref 3.5–5.2)
RBC # BLD AUTO: 3.51 MILL/MM3 (ref 4.2–5.4)
SCAN OF BLOOD SMEAR: NORMAL
SEGMENTED NEUTROPHILS ABSOLUTE COUNT: 4.3 THOU/MM3 (ref 1.8–7.7)
SODIUM SERPL-SCNC: 136 MEQ/L (ref 135–145)
VARIANT LYMPHS BLD QL SMEAR: ABNORMAL %
WBC # BLD AUTO: 7.8 THOU/MM3 (ref 4.8–10.8)

## 2024-01-20 PROCEDURE — 74018 RADEX ABDOMEN 1 VIEW: CPT

## 2024-01-22 LAB
ALBUMIN SERPL BCG-MCNC: 3.3 G/DL (ref 3.5–5.1)
ALP SERPL-CCNC: 93 U/L (ref 38–126)
ALT SERPL W/O P-5'-P-CCNC: 30 U/L (ref 11–66)
ANION GAP SERPL CALC-SCNC: 11 MEQ/L (ref 8–16)
AST SERPL-CCNC: 23 U/L (ref 5–40)
BASOPHILS ABSOLUTE: 0 THOU/MM3 (ref 0–0.1)
BASOPHILS NFR BLD AUTO: 0.6 %
BILIRUB CONJ SERPL-MCNC: < 0.2 MG/DL (ref 0–0.3)
BILIRUB SERPL-MCNC: 0.3 MG/DL (ref 0.3–1.2)
BUN SERPL-MCNC: 21 MG/DL (ref 7–22)
CALCIUM SERPL-MCNC: 9.3 MG/DL (ref 8.5–10.5)
CHLORIDE SERPL-SCNC: 104 MEQ/L (ref 98–111)
CO2 SERPL-SCNC: 24 MEQ/L (ref 23–33)
CREAT SERPL-MCNC: 0.4 MG/DL (ref 0.4–1.2)
DEPRECATED RDW RBC AUTO: 50.6 FL (ref 35–45)
EOSINOPHIL NFR BLD AUTO: 6.2 %
EOSINOPHILS ABSOLUTE: 0.4 THOU/MM3 (ref 0–0.4)
ERYTHROCYTE [DISTWIDTH] IN BLOOD BY AUTOMATED COUNT: 15.1 % (ref 11.5–14.5)
GFR SERPL CREATININE-BSD FRML MDRD: > 60 ML/MIN/1.73M2
GLUCOSE SERPL-MCNC: 99 MG/DL (ref 70–108)
HCT VFR BLD AUTO: 32.1 % (ref 37–47)
HGB BLD-MCNC: 10.4 GM/DL (ref 12–16)
IMM GRANULOCYTES # BLD AUTO: 0.04 THOU/MM3 (ref 0–0.07)
IMM GRANULOCYTES NFR BLD AUTO: 0.6 %
LYMPHOCYTES ABSOLUTE: 2.4 THOU/MM3 (ref 1–4.8)
LYMPHOCYTES NFR BLD AUTO: 34.4 %
MAGNESIUM SERPL-MCNC: 1.8 MG/DL (ref 1.6–2.4)
MCH RBC QN AUTO: 30.1 PG (ref 26–33)
MCHC RBC AUTO-ENTMCNC: 32.4 GM/DL (ref 32.2–35.5)
MCV RBC AUTO: 93 FL (ref 81–99)
MONOCYTES ABSOLUTE: 0.8 THOU/MM3 (ref 0.4–1.3)
MONOCYTES NFR BLD AUTO: 11.5 %
NEUTROPHILS NFR BLD AUTO: 46.7 %
NRBC BLD AUTO-RTO: 0 /100 WBC
PHOSPHATE SERPL-MCNC: 3.6 MG/DL (ref 2.4–4.7)
PLATELET # BLD AUTO: 232 THOU/MM3 (ref 130–400)
PMV BLD AUTO: 9.3 FL (ref 9.4–12.4)
POTASSIUM SERPL-SCNC: 4.4 MEQ/L (ref 3.5–5.2)
PREALB SERPL-MCNC: 25.5 MG/DL (ref 20–40)
PROT SERPL-MCNC: 6.7 G/DL (ref 6.1–8)
RBC # BLD AUTO: 3.45 MILL/MM3 (ref 4.2–5.4)
SEGMENTED NEUTROPHILS ABSOLUTE COUNT: 3.3 THOU/MM3 (ref 1.8–7.7)
SODIUM SERPL-SCNC: 139 MEQ/L (ref 135–145)
TRIGL SERPL-MCNC: 139 MG/DL (ref 0–199)
WBC # BLD AUTO: 7 THOU/MM3 (ref 4.8–10.8)

## 2024-01-23 LAB
ANION GAP SERPL CALC-SCNC: 13 MEQ/L (ref 8–16)
BUN SERPL-MCNC: 20 MG/DL (ref 7–22)
CALCIUM SERPL-MCNC: 9.4 MG/DL (ref 8.5–10.5)
CHLORIDE SERPL-SCNC: 103 MEQ/L (ref 98–111)
CO2 SERPL-SCNC: 21 MEQ/L (ref 23–33)
CREAT SERPL-MCNC: 0.5 MG/DL (ref 0.4–1.2)
GFR SERPL CREATININE-BSD FRML MDRD: > 60 ML/MIN/1.73M2
GLUCOSE SERPL-MCNC: 89 MG/DL (ref 70–108)
POTASSIUM SERPL-SCNC: 4.3 MEQ/L (ref 3.5–5.2)
SODIUM SERPL-SCNC: 137 MEQ/L (ref 135–145)

## 2025-04-20 NOTE — PROGRESS NOTES
Lipid panel shows improved cholesterol levels.  Triglycerides however are high.  I would recommend starting 2000 mg of fish oil nightly and repeat a lipid panel to trend the high triglycerides in 1 month Occupational Therapy  Facility/Department: [facility]  Daily Treatment Note  NAME: Grace Gonzalez  : 1945  MRN: [id number]    Date of Service: 2024    Discharge Recommendations:  Continue to assess pending progress, Subacute/Skilled Nursing Facility         Patient Diagnosis(es): The primary encounter diagnosis was Small bowel obstruction (HCC). Diagnoses of Other specified intestinal obstruction, unspecified whether partial or complete (HCC) and Acute respiratory failure with hypoxia (HCC) were also pertinent to this visit.     Assessment    Activity Tolerance: Patient limited by fatigue;Patient limited by endurance  Discharge Recommendations: Continue to assess pending progress;Subacute/Skilled Nursing Facility      Plan   Occupational Therapy Plan  Times Per Day: Once a day  Days Per Week: 7 Days  Current Treatment Recommendations: Strengthening;Balance training;Functional mobility training;Endurance training;Safety education & training;Patient/Caregiver education & training;Positioning;Self-Care / ADL;Home management training     Restrictions  Restrictions/Precautions  Restrictions/Precautions: NPO;General Precautions;Fall Risk;Up as Tolerated    Subjective   Subjective  Subjective: Pt sitting up in bedside chair upon arrival. Pt agreed to participate in therapy session.  Pain: Pt reported 5/10 \"all over\" pain.          Objective    Vitals          OT Exercises  Exercise Treatment: Pt completed BUE ther ex x 5 planes x 10 reps x 1 set to increase UE strength and endurance in order to ease completion of ADL tasks. Pt required RBs as needed secondary to fatigue.     Safety Devices  Type of Devices: All fall risk precautions in place;Call light within reach;Nurse notified;Left in chair     Patient Education  Education Given To: Patient  Education Provided: Role of Therapy;Plan of Care  Education Method: Verbal  Barriers to Learning: None  Education Outcome: Verbalized understanding    Goals  Short Term

## (undated) DEVICE — TOTAL TRAY, DB, 100% SILI FOLEY, 16FR 10: Brand: MEDLINE

## (undated) DEVICE — SYRINGE, LUER LOCK, 10ML: Brand: MEDLINE

## (undated) DEVICE — TUBING, SUCTION, 9/32" X 12', STRAIGHT: Brand: MEDLINE INDUSTRIES, INC.

## (undated) DEVICE — INTENDED FOR TISSUE SEPARATION, AND OTHER PROCEDURES THAT REQUIRE A SHARP SURGICAL BLADE TO PUNCTURE OR CUT.: Brand: BARD-PARKER ® STAINLESS STEEL BLADES

## (undated) DEVICE — Device

## (undated) DEVICE — SHEET, T, LAPAROTOMY, STERILE: Brand: MEDLINE

## (undated) DEVICE — 3M™ PRECISE™ VISTA DISPOSABLE SKIN STAPLER 3995: Brand: 3M™ PRECISE™

## (undated) DEVICE — YANKAUER,FLEXIBLE HANDLE,REGLR CAPACITY: Brand: MEDLINE INDUSTRIES, INC.

## (undated) DEVICE — SUTURE VCRL + 1 L36IN ABSRB VLT CT L40MM 1/2 CIR TAPERPOINT VCP359H

## (undated) DEVICE — GLOVE SURG SZ 7 CRM LTX FREE POLYISOPRENE POLYMER BEAD ANTI

## (undated) DEVICE — PAD,NON-ADHERENT,3X8,STERILE,LF,1/PK: Brand: MEDLINE

## (undated) DEVICE — SOLUTION IRRIG 1000ML 0.9% SOD CHL USP POUR PLAS BTL

## (undated) DEVICE — GAUZE,SPONGE,4"X4",16PLY,STRL,LF,10/TRAY: Brand: MEDLINE

## (undated) DEVICE — SEALER ENDOSCP NANO COAT OPN DIV CRV L JAW LIGASURE IMPACT

## (undated) DEVICE — GOWN,SIRUS,POLYRNF,BRTHSLV,XL,30/CS: Brand: MEDLINE

## (undated) DEVICE — DRAPE,EXTREMITY,89X128,STERILE: Brand: MEDLINE

## (undated) DEVICE — PAD,ABDOMINAL,8"X10",ST,LF: Brand: MEDLINE

## (undated) DEVICE — GLOVE SURG SZ 75 CRM LTX FREE POLYISOPRENE POLYMER BEAD ANTI